# Patient Record
Sex: FEMALE | Race: WHITE | Employment: UNEMPLOYED | URBAN - METROPOLITAN AREA
[De-identification: names, ages, dates, MRNs, and addresses within clinical notes are randomized per-mention and may not be internally consistent; named-entity substitution may affect disease eponyms.]

---

## 2020-08-07 ENCOUNTER — APPOINTMENT (OUTPATIENT)
Dept: CT IMAGING | Age: 70
DRG: 853 | End: 2020-08-07
Payer: COMMERCIAL

## 2020-08-07 ENCOUNTER — APPOINTMENT (OUTPATIENT)
Dept: GENERAL RADIOLOGY | Age: 70
DRG: 853 | End: 2020-08-07
Payer: COMMERCIAL

## 2020-08-07 ENCOUNTER — ANESTHESIA (OUTPATIENT)
Dept: OPERATING ROOM | Age: 70
DRG: 853 | End: 2020-08-07
Payer: COMMERCIAL

## 2020-08-07 ENCOUNTER — ANESTHESIA EVENT (OUTPATIENT)
Dept: OPERATING ROOM | Age: 70
DRG: 853 | End: 2020-08-07
Payer: COMMERCIAL

## 2020-08-07 ENCOUNTER — HOSPITAL ENCOUNTER (INPATIENT)
Age: 70
LOS: 5 days | Discharge: NURSING, REHAB OR LONG-TERM CARE | DRG: 853 | End: 2020-08-12
Attending: EMERGENCY MEDICINE | Admitting: STUDENT IN AN ORGANIZED HEALTH CARE EDUCATION/TRAINING PROGRAM
Payer: COMMERCIAL

## 2020-08-07 VITALS
DIASTOLIC BLOOD PRESSURE: 58 MMHG | SYSTOLIC BLOOD PRESSURE: 95 MMHG | RESPIRATION RATE: 1 BRPM | TEMPERATURE: 96.8 F | OXYGEN SATURATION: 100 %

## 2020-08-07 PROBLEM — E11.10 DKA, TYPE 2, NOT AT GOAL (HCC): Status: ACTIVE | Noted: 2020-08-07

## 2020-08-07 LAB
A/G RATIO: 0.9 (ref 1.1–2.2)
ALBUMIN SERPL-MCNC: 2.9 G/DL (ref 3.4–5)
ALP BLD-CCNC: 82 U/L (ref 40–129)
ALT SERPL-CCNC: 38 U/L (ref 10–40)
ANION GAP SERPL CALCULATED.3IONS-SCNC: 11 MMOL/L (ref 3–16)
ANION GAP SERPL CALCULATED.3IONS-SCNC: 11 MMOL/L (ref 3–16)
ANION GAP SERPL CALCULATED.3IONS-SCNC: 12 MMOL/L (ref 3–16)
ANION GAP SERPL CALCULATED.3IONS-SCNC: 18 MMOL/L (ref 3–16)
AST SERPL-CCNC: 44 U/L (ref 15–37)
BACTERIA: ABNORMAL /HPF
BANDED NEUTROPHILS RELATIVE PERCENT: 4 % (ref 0–7)
BASE EXCESS VENOUS: -4.8 MMOL/L (ref -3–3)
BASE EXCESS VENOUS: -5 MMOL/L (ref -3–3)
BASOPHILS ABSOLUTE: 0 K/UL (ref 0–0.2)
BASOPHILS RELATIVE PERCENT: 0 %
BETA-HYDROXYBUTYRATE: 0.3 MMOL/L (ref 0–0.27)
BILIRUB SERPL-MCNC: 0.9 MG/DL (ref 0–1)
BILIRUBIN URINE: NEGATIVE
BLOOD, URINE: ABNORMAL
BUN BLDV-MCNC: 49 MG/DL (ref 7–20)
BUN BLDV-MCNC: 52 MG/DL (ref 7–20)
BUN BLDV-MCNC: 59 MG/DL (ref 7–20)
BUN BLDV-MCNC: 59 MG/DL (ref 7–20)
CALCIUM SERPL-MCNC: 7.9 MG/DL (ref 8.3–10.6)
CALCIUM SERPL-MCNC: 8 MG/DL (ref 8.3–10.6)
CALCIUM SERPL-MCNC: 8.2 MG/DL (ref 8.3–10.6)
CALCIUM SERPL-MCNC: 8.5 MG/DL (ref 8.3–10.6)
CARBOXYHEMOGLOBIN: 2.5 % (ref 0–1.5)
CARBOXYHEMOGLOBIN: 4.2 % (ref 0–1.5)
CHLORIDE BLD-SCNC: 83 MMOL/L (ref 99–110)
CHLORIDE BLD-SCNC: 91 MMOL/L (ref 99–110)
CHLORIDE BLD-SCNC: 96 MMOL/L (ref 99–110)
CHLORIDE BLD-SCNC: 97 MMOL/L (ref 99–110)
CLARITY: CLEAR
CO2: 18 MMOL/L (ref 21–32)
CO2: 19 MMOL/L (ref 21–32)
CO2: 20 MMOL/L (ref 21–32)
CO2: 21 MMOL/L (ref 21–32)
COLOR: YELLOW
CREAT SERPL-MCNC: 1.7 MG/DL (ref 0.6–1.2)
CREAT SERPL-MCNC: 2 MG/DL (ref 0.6–1.2)
CREAT SERPL-MCNC: 2.2 MG/DL (ref 0.6–1.2)
CREAT SERPL-MCNC: 2.3 MG/DL (ref 0.6–1.2)
EKG ATRIAL RATE: 101 BPM
EKG DIAGNOSIS: NORMAL
EKG P AXIS: 58 DEGREES
EKG P-R INTERVAL: 182 MS
EKG Q-T INTERVAL: 352 MS
EKG QRS DURATION: 104 MS
EKG QTC CALCULATION (BAZETT): 456 MS
EKG R AXIS: -16 DEGREES
EKG T AXIS: 53 DEGREES
EKG VENTRICULAR RATE: 101 BPM
EOSINOPHILS ABSOLUTE: 0 K/UL (ref 0–0.6)
EOSINOPHILS RELATIVE PERCENT: 0 %
EPITHELIAL CELLS, UA: ABNORMAL /HPF (ref 0–5)
GFR AFRICAN AMERICAN: 25
GFR AFRICAN AMERICAN: 27
GFR AFRICAN AMERICAN: 30
GFR AFRICAN AMERICAN: 36
GFR NON-AFRICAN AMERICAN: 21
GFR NON-AFRICAN AMERICAN: 22
GFR NON-AFRICAN AMERICAN: 25
GFR NON-AFRICAN AMERICAN: 30
GLOBULIN: 3.4 G/DL
GLUCOSE BLD-MCNC: 327 MG/DL (ref 70–99)
GLUCOSE BLD-MCNC: 332 MG/DL (ref 70–99)
GLUCOSE BLD-MCNC: 349 MG/DL (ref 70–99)
GLUCOSE BLD-MCNC: 355 MG/DL (ref 70–99)
GLUCOSE BLD-MCNC: 380 MG/DL (ref 70–99)
GLUCOSE BLD-MCNC: 381 MG/DL (ref 70–99)
GLUCOSE BLD-MCNC: 394 MG/DL (ref 70–99)
GLUCOSE BLD-MCNC: 420 MG/DL (ref 70–99)
GLUCOSE BLD-MCNC: 444 MG/DL (ref 70–99)
GLUCOSE BLD-MCNC: 581 MG/DL (ref 70–99)
GLUCOSE BLD-MCNC: 637 MG/DL (ref 70–99)
GLUCOSE BLD-MCNC: 820 MG/DL (ref 70–99)
GLUCOSE BLD-MCNC: 853 MG/DL (ref 70–99)
GLUCOSE BLD-MCNC: >600 MG/DL (ref 70–99)
GLUCOSE BLD-MCNC: >700 MG/DL (ref 70–99)
GLUCOSE URINE: 500 MG/DL
HCO3 VENOUS: 18.1 MMOL/L (ref 23–29)
HCO3 VENOUS: 19.5 MMOL/L (ref 23–29)
HCT VFR BLD CALC: 30.9 % (ref 36–48)
HEMOGLOBIN: 9.7 G/DL (ref 12–16)
INR BLD: 1.29 (ref 0.86–1.14)
KETONES, URINE: NEGATIVE MG/DL
LACTIC ACID: 1.6 MMOL/L (ref 0.4–2)
LACTIC ACID: 3.1 MMOL/L (ref 0.4–2)
LEUKOCYTE ESTERASE, URINE: NEGATIVE
LIPASE: 25 U/L (ref 13–60)
LYMPHOCYTES ABSOLUTE: 0.4 K/UL (ref 1–5.1)
LYMPHOCYTES RELATIVE PERCENT: 2 %
MAGNESIUM: 2.2 MG/DL (ref 1.8–2.4)
MCH RBC QN AUTO: 29.7 PG (ref 26–34)
MCHC RBC AUTO-ENTMCNC: 31.5 G/DL (ref 31–36)
MCV RBC AUTO: 94.4 FL (ref 80–100)
METHEMOGLOBIN VENOUS: 0.3 %
METHEMOGLOBIN VENOUS: 0.6 %
MICROSCOPIC EXAMINATION: YES
MONOCYTES ABSOLUTE: 1.1 K/UL (ref 0–1.3)
MONOCYTES RELATIVE PERCENT: 6 %
NEUTROPHILS ABSOLUTE: 17.3 K/UL (ref 1.7–7.7)
NEUTROPHILS RELATIVE PERCENT: 88 %
NITRITE, URINE: NEGATIVE
O2 CONTENT, VEN: 12 VOL %
O2 CONTENT, VEN: 14 VOL %
O2 SAT, VEN: 98 %
O2 SAT, VEN: 99 %
O2 THERAPY: ABNORMAL
O2 THERAPY: ABNORMAL
PCO2, VEN: 26.7 MMHG (ref 40–50)
PCO2, VEN: 32 MMHG (ref 40–50)
PDW BLD-RTO: 14.7 % (ref 12.4–15.4)
PERFORMED ON: ABNORMAL
PH UA: 5 (ref 5–8)
PH VENOUS: 7.39 (ref 7.35–7.45)
PH VENOUS: 7.44 (ref 7.35–7.45)
PHOSPHORUS: 2.4 MG/DL (ref 2.5–4.9)
PLATELET # BLD: 231 K/UL (ref 135–450)
PLATELET SLIDE REVIEW: ADEQUATE
PMV BLD AUTO: 7.6 FL (ref 5–10.5)
PO2, VEN: 138 MMHG (ref 25–40)
PO2, VEN: 179 MMHG (ref 25–40)
POC SAMPLE TYPE: ABNORMAL
POTASSIUM REFLEX MAGNESIUM: 4.5 MMOL/L (ref 3.5–5.1)
POTASSIUM SERPL-SCNC: 3.6 MMOL/L (ref 3.5–5.1)
POTASSIUM SERPL-SCNC: 4 MMOL/L (ref 3.5–5.1)
POTASSIUM SERPL-SCNC: 5.1 MMOL/L (ref 3.5–5.1)
PRO-BNP: 1468 PG/ML (ref 0–124)
PROTEIN UA: NEGATIVE MG/DL
PROTHROMBIN TIME: 15 SEC (ref 10–13.2)
RBC # BLD: 3.27 M/UL (ref 4–5.2)
RBC UA: ABNORMAL /HPF (ref 0–4)
SLIDE REVIEW: ABNORMAL
SODIUM BLD-SCNC: 119 MMOL/L (ref 136–145)
SODIUM BLD-SCNC: 122 MMOL/L (ref 136–145)
SODIUM BLD-SCNC: 128 MMOL/L (ref 136–145)
SODIUM BLD-SCNC: 128 MMOL/L (ref 136–145)
SPECIFIC GRAVITY UA: 1.01 (ref 1–1.03)
TCO2 CALC VENOUS: 42 MMOL/L
TCO2 CALC VENOUS: 46 MMOL/L
TOTAL CK: 728 U/L (ref 26–192)
TOTAL PROTEIN: 6.3 G/DL (ref 6.4–8.2)
TROPONIN: <0.01 NG/ML
TSH REFLEX: 0.41 UIU/ML (ref 0.27–4.2)
URINE REFLEX TO CULTURE: ABNORMAL
URINE TYPE: ABNORMAL
UROBILINOGEN, URINE: 1 E.U./DL
WBC # BLD: 18.8 K/UL (ref 4–11)
WBC UA: ABNORMAL /HPF (ref 0–5)

## 2020-08-07 PROCEDURE — APPNB30 APP NON BILLABLE TIME 0-30 MINS: Performed by: NURSE PRACTITIONER

## 2020-08-07 PROCEDURE — 85025 COMPLETE CBC W/AUTO DIFF WBC: CPT

## 2020-08-07 PROCEDURE — 2500000003 HC RX 250 WO HCPCS: Performed by: NURSE ANESTHETIST, CERTIFIED REGISTERED

## 2020-08-07 PROCEDURE — 87070 CULTURE OTHR SPECIMN AEROBIC: CPT

## 2020-08-07 PROCEDURE — 82550 ASSAY OF CK (CPK): CPT

## 2020-08-07 PROCEDURE — 7100000000 HC PACU RECOVERY - FIRST 15 MIN: Performed by: SURGERY

## 2020-08-07 PROCEDURE — 87015 SPECIMEN INFECT AGNT CONCNTJ: CPT

## 2020-08-07 PROCEDURE — 80053 COMPREHEN METABOLIC PANEL: CPT

## 2020-08-07 PROCEDURE — 84484 ASSAY OF TROPONIN QUANT: CPT

## 2020-08-07 PROCEDURE — 73700 CT LOWER EXTREMITY W/O DYE: CPT

## 2020-08-07 PROCEDURE — 99291 CRITICAL CARE FIRST HOUR: CPT

## 2020-08-07 PROCEDURE — 2709999900 HC NON-CHARGEABLE SUPPLY: Performed by: SURGERY

## 2020-08-07 PROCEDURE — 36415 COLL VENOUS BLD VENIPUNCTURE: CPT

## 2020-08-07 PROCEDURE — 96367 TX/PROPH/DG ADDL SEQ IV INF: CPT

## 2020-08-07 PROCEDURE — 82803 BLOOD GASES ANY COMBINATION: CPT

## 2020-08-07 PROCEDURE — 6360000002 HC RX W HCPCS: Performed by: STUDENT IN AN ORGANIZED HEALTH CARE EDUCATION/TRAINING PROGRAM

## 2020-08-07 PROCEDURE — 84443 ASSAY THYROID STIM HORMONE: CPT

## 2020-08-07 PROCEDURE — 2580000003 HC RX 258: Performed by: EMERGENCY MEDICINE

## 2020-08-07 PROCEDURE — U0003 INFECTIOUS AGENT DETECTION BY NUCLEIC ACID (DNA OR RNA); SEVERE ACUTE RESPIRATORY SYNDROME CORONAVIRUS 2 (SARS-COV-2) (CORONAVIRUS DISEASE [COVID-19]), AMPLIFIED PROBE TECHNIQUE, MAKING USE OF HIGH THROUGHPUT TECHNOLOGIES AS DESCRIBED BY CMS-2020-01-R: HCPCS

## 2020-08-07 PROCEDURE — 83935 ASSAY OF URINE OSMOLALITY: CPT

## 2020-08-07 PROCEDURE — 2580000003 HC RX 258: Performed by: NURSE ANESTHETIST, CERTIFIED REGISTERED

## 2020-08-07 PROCEDURE — 70450 CT HEAD/BRAIN W/O DYE: CPT

## 2020-08-07 PROCEDURE — 87075 CULTR BACTERIA EXCEPT BLOOD: CPT

## 2020-08-07 PROCEDURE — 0KBR0ZZ EXCISION OF LEFT UPPER LEG MUSCLE, OPEN APPROACH: ICD-10-PCS | Performed by: SURGERY

## 2020-08-07 PROCEDURE — 87206 SMEAR FLUORESCENT/ACID STAI: CPT

## 2020-08-07 PROCEDURE — 6360000002 HC RX W HCPCS: Performed by: NURSE ANESTHETIST, CERTIFIED REGISTERED

## 2020-08-07 PROCEDURE — 2500000003 HC RX 250 WO HCPCS: Performed by: STUDENT IN AN ORGANIZED HEALTH CARE EDUCATION/TRAINING PROGRAM

## 2020-08-07 PROCEDURE — 87040 BLOOD CULTURE FOR BACTERIA: CPT

## 2020-08-07 PROCEDURE — 96365 THER/PROPH/DIAG IV INF INIT: CPT

## 2020-08-07 PROCEDURE — 3700000000 HC ANESTHESIA ATTENDED CARE: Performed by: SURGERY

## 2020-08-07 PROCEDURE — 99292 CRITICAL CARE ADDL 30 MIN: CPT

## 2020-08-07 PROCEDURE — 6360000002 HC RX W HCPCS: Performed by: EMERGENCY MEDICINE

## 2020-08-07 PROCEDURE — 87205 SMEAR GRAM STAIN: CPT

## 2020-08-07 PROCEDURE — 6370000000 HC RX 637 (ALT 250 FOR IP): Performed by: EMERGENCY MEDICINE

## 2020-08-07 PROCEDURE — 87077 CULTURE AEROBIC IDENTIFY: CPT

## 2020-08-07 PROCEDURE — 2580000003 HC RX 258: Performed by: STUDENT IN AN ORGANIZED HEALTH CARE EDUCATION/TRAINING PROGRAM

## 2020-08-07 PROCEDURE — 2580000003 HC RX 258: Performed by: SURGERY

## 2020-08-07 PROCEDURE — 84300 ASSAY OF URINE SODIUM: CPT

## 2020-08-07 PROCEDURE — 3700000001 HC ADD 15 MINUTES (ANESTHESIA): Performed by: SURGERY

## 2020-08-07 PROCEDURE — 83735 ASSAY OF MAGNESIUM: CPT

## 2020-08-07 PROCEDURE — 87186 SC STD MICRODIL/AGAR DIL: CPT

## 2020-08-07 PROCEDURE — 81001 URINALYSIS AUTO W/SCOPE: CPT

## 2020-08-07 PROCEDURE — 11006 DBRDMT SKIN XTRNL GENT PER: CPT | Performed by: SURGERY

## 2020-08-07 PROCEDURE — 3600000012 HC SURGERY LEVEL 2 ADDTL 15MIN: Performed by: SURGERY

## 2020-08-07 PROCEDURE — 99232 SBSQ HOSP IP/OBS MODERATE 35: CPT | Performed by: INTERNAL MEDICINE

## 2020-08-07 PROCEDURE — 85610 PROTHROMBIN TIME: CPT

## 2020-08-07 PROCEDURE — 83880 ASSAY OF NATRIURETIC PEPTIDE: CPT

## 2020-08-07 PROCEDURE — 71045 X-RAY EXAM CHEST 1 VIEW: CPT

## 2020-08-07 PROCEDURE — 72125 CT NECK SPINE W/O DYE: CPT

## 2020-08-07 PROCEDURE — 84100 ASSAY OF PHOSPHORUS: CPT

## 2020-08-07 PROCEDURE — 3600000002 HC SURGERY LEVEL 2 BASE: Performed by: SURGERY

## 2020-08-07 PROCEDURE — 82010 KETONE BODYS QUAN: CPT

## 2020-08-07 PROCEDURE — 83690 ASSAY OF LIPASE: CPT

## 2020-08-07 PROCEDURE — 93005 ELECTROCARDIOGRAM TRACING: CPT | Performed by: EMERGENCY MEDICINE

## 2020-08-07 PROCEDURE — APPSS60 APP SPLIT SHARED TIME 46-60 MINUTES: Performed by: NURSE PRACTITIONER

## 2020-08-07 PROCEDURE — 1200000000 HC SEMI PRIVATE

## 2020-08-07 PROCEDURE — 82947 ASSAY GLUCOSE BLOOD QUANT: CPT

## 2020-08-07 PROCEDURE — 93010 ELECTROCARDIOGRAM REPORT: CPT | Performed by: INTERNAL MEDICINE

## 2020-08-07 PROCEDURE — 87102 FUNGUS ISOLATION CULTURE: CPT

## 2020-08-07 PROCEDURE — 83605 ASSAY OF LACTIC ACID: CPT

## 2020-08-07 PROCEDURE — 87116 MYCOBACTERIA CULTURE: CPT

## 2020-08-07 PROCEDURE — 7100000001 HC PACU RECOVERY - ADDTL 15 MIN: Performed by: SURGERY

## 2020-08-07 PROCEDURE — 74176 CT ABD & PELVIS W/O CONTRAST: CPT

## 2020-08-07 RX ORDER — SODIUM CHLORIDE 9 MG/ML
INJECTION, SOLUTION INTRAVENOUS CONTINUOUS PRN
Status: DISCONTINUED | OUTPATIENT
Start: 2020-08-07 | End: 2020-08-07 | Stop reason: SDUPTHER

## 2020-08-07 RX ORDER — OXCARBAZEPINE 600 MG/1
600 TABLET, FILM COATED ORAL 2 TIMES DAILY
COMMUNITY

## 2020-08-07 RX ORDER — 0.9 % SODIUM CHLORIDE 0.9 %
30 INTRAVENOUS SOLUTION INTRAVENOUS ONCE
Status: DISCONTINUED | OUTPATIENT
Start: 2020-08-07 | End: 2020-08-07

## 2020-08-07 RX ORDER — HYDRALAZINE HYDROCHLORIDE 20 MG/ML
5 INJECTION INTRAMUSCULAR; INTRAVENOUS EVERY 10 MIN PRN
Status: DISCONTINUED | OUTPATIENT
Start: 2020-08-07 | End: 2020-08-08

## 2020-08-07 RX ORDER — PROPOFOL 10 MG/ML
INJECTION, EMULSION INTRAVENOUS PRN
Status: DISCONTINUED | OUTPATIENT
Start: 2020-08-07 | End: 2020-08-07 | Stop reason: SDUPTHER

## 2020-08-07 RX ORDER — INSULIN DETEMIR 100 [IU]/ML
30 INJECTION, SOLUTION SUBCUTANEOUS NIGHTLY
COMMUNITY

## 2020-08-07 RX ORDER — OXYBUTYNIN CHLORIDE 5 MG/1
5 TABLET ORAL 2 TIMES DAILY
COMMUNITY

## 2020-08-07 RX ORDER — CLINDAMYCIN PHOSPHATE 600 MG/50ML
600 INJECTION INTRAVENOUS EVERY 8 HOURS
Status: DISCONTINUED | OUTPATIENT
Start: 2020-08-07 | End: 2020-08-09

## 2020-08-07 RX ORDER — FAMOTIDINE 20 MG/1
20 TABLET, FILM COATED ORAL DAILY
Status: DISCONTINUED | OUTPATIENT
Start: 2020-08-07 | End: 2020-08-12 | Stop reason: HOSPADM

## 2020-08-07 RX ORDER — ACETAMINOPHEN 650 MG
TABLET, EXTENDED RELEASE ORAL
Status: COMPLETED | OUTPATIENT
Start: 2020-08-07 | End: 2020-08-07

## 2020-08-07 RX ORDER — SODIUM CHLORIDE 9 MG/ML
2000 INJECTION, SOLUTION INTRAVENOUS ONCE
Status: COMPLETED | OUTPATIENT
Start: 2020-08-07 | End: 2020-08-07

## 2020-08-07 RX ORDER — LOSARTAN POTASSIUM 50 MG/1
100 TABLET ORAL DAILY
COMMUNITY

## 2020-08-07 RX ORDER — DEXTROSE MONOHYDRATE 25 G/50ML
12.5 INJECTION, SOLUTION INTRAVENOUS PRN
Status: DISCONTINUED | OUTPATIENT
Start: 2020-08-07 | End: 2020-08-12 | Stop reason: HOSPADM

## 2020-08-07 RX ORDER — ACETAMINOPHEN 500 MG
1000 TABLET ORAL ONCE
Status: COMPLETED | OUTPATIENT
Start: 2020-08-07 | End: 2020-08-07

## 2020-08-07 RX ORDER — FENOFIBRATE 145 MG/1
145 TABLET, COATED ORAL DAILY
COMMUNITY
End: 2021-02-22

## 2020-08-07 RX ORDER — SODIUM CHLORIDE 0.9 % (FLUSH) 0.9 %
10 SYRINGE (ML) INJECTION EVERY 12 HOURS SCHEDULED
Status: DISCONTINUED | OUTPATIENT
Start: 2020-08-07 | End: 2020-08-10

## 2020-08-07 RX ORDER — PHENYLEPHRINE HCL IN 0.9% NACL 1 MG/10 ML
SYRINGE (ML) INTRAVENOUS PRN
Status: DISCONTINUED | OUTPATIENT
Start: 2020-08-07 | End: 2020-08-07 | Stop reason: SDUPTHER

## 2020-08-07 RX ORDER — GLYCOPYRROLATE 0.2 MG/ML
INJECTION INTRAMUSCULAR; INTRAVENOUS PRN
Status: DISCONTINUED | OUTPATIENT
Start: 2020-08-07 | End: 2020-08-07 | Stop reason: SDUPTHER

## 2020-08-07 RX ORDER — DEXTROSE MONOHYDRATE 25 G/50ML
12.5 INJECTION, SOLUTION INTRAVENOUS PRN
Status: DISCONTINUED | OUTPATIENT
Start: 2020-08-07 | End: 2020-08-07 | Stop reason: SDUPTHER

## 2020-08-07 RX ORDER — METOCLOPRAMIDE HYDROCHLORIDE 5 MG/ML
INJECTION INTRAMUSCULAR; INTRAVENOUS PRN
Status: DISCONTINUED | OUTPATIENT
Start: 2020-08-07 | End: 2020-08-07 | Stop reason: SDUPTHER

## 2020-08-07 RX ORDER — FUROSEMIDE 40 MG/1
40 TABLET ORAL DAILY
COMMUNITY

## 2020-08-07 RX ORDER — NICOTINE POLACRILEX 4 MG
15 LOZENGE BUCCAL PRN
Status: DISCONTINUED | OUTPATIENT
Start: 2020-08-07 | End: 2020-08-07 | Stop reason: SDUPTHER

## 2020-08-07 RX ORDER — ONDANSETRON 2 MG/ML
4 INJECTION INTRAMUSCULAR; INTRAVENOUS
Status: ACTIVE | OUTPATIENT
Start: 2020-08-07 | End: 2020-08-07

## 2020-08-07 RX ORDER — 0.9 % SODIUM CHLORIDE 0.9 %
15 INTRAVENOUS SOLUTION INTRAVENOUS ONCE
Status: COMPLETED | OUTPATIENT
Start: 2020-08-07 | End: 2020-08-07

## 2020-08-07 RX ORDER — ONDANSETRON 2 MG/ML
INJECTION INTRAMUSCULAR; INTRAVENOUS PRN
Status: DISCONTINUED | OUTPATIENT
Start: 2020-08-07 | End: 2020-08-07 | Stop reason: SDUPTHER

## 2020-08-07 RX ORDER — INSULIN LISPRO 100 [IU]/ML
0-6 INJECTION, SOLUTION INTRAVENOUS; SUBCUTANEOUS NIGHTLY
Status: DISCONTINUED | OUTPATIENT
Start: 2020-08-07 | End: 2020-08-12 | Stop reason: HOSPADM

## 2020-08-07 RX ORDER — POLYETHYLENE GLYCOL 3350 17 G/17G
17 POWDER, FOR SOLUTION ORAL DAILY PRN
Status: DISCONTINUED | OUTPATIENT
Start: 2020-08-07 | End: 2020-08-12 | Stop reason: HOSPADM

## 2020-08-07 RX ORDER — PROMETHAZINE HYDROCHLORIDE 25 MG/1
12.5 TABLET ORAL EVERY 6 HOURS PRN
Status: DISCONTINUED | OUTPATIENT
Start: 2020-08-07 | End: 2020-08-12 | Stop reason: HOSPADM

## 2020-08-07 RX ORDER — SODIUM CHLORIDE 0.9 % (FLUSH) 0.9 %
10 SYRINGE (ML) INJECTION PRN
Status: DISCONTINUED | OUTPATIENT
Start: 2020-08-07 | End: 2020-08-10

## 2020-08-07 RX ORDER — ONDANSETRON 2 MG/ML
4 INJECTION INTRAMUSCULAR; INTRAVENOUS EVERY 6 HOURS PRN
Status: DISCONTINUED | OUTPATIENT
Start: 2020-08-07 | End: 2020-08-12 | Stop reason: HOSPADM

## 2020-08-07 RX ORDER — DEXTROSE MONOHYDRATE 50 MG/ML
100 INJECTION, SOLUTION INTRAVENOUS PRN
Status: DISCONTINUED | OUTPATIENT
Start: 2020-08-07 | End: 2020-08-07 | Stop reason: SDUPTHER

## 2020-08-07 RX ORDER — MAGNESIUM HYDROXIDE 1200 MG/15ML
LIQUID ORAL CONTINUOUS PRN
Status: COMPLETED | OUTPATIENT
Start: 2020-08-07 | End: 2020-08-07

## 2020-08-07 RX ORDER — METOPROLOL TARTRATE 50 MG/1
50 TABLET, FILM COATED ORAL 2 TIMES DAILY
COMMUNITY

## 2020-08-07 RX ORDER — LABETALOL HYDROCHLORIDE 5 MG/ML
5 INJECTION, SOLUTION INTRAVENOUS EVERY 10 MIN PRN
Status: DISCONTINUED | OUTPATIENT
Start: 2020-08-07 | End: 2020-08-08

## 2020-08-07 RX ORDER — HYDROMORPHONE HCL 110MG/55ML
0.5 PATIENT CONTROLLED ANALGESIA SYRINGE INTRAVENOUS EVERY 5 MIN PRN
Status: DISCONTINUED | OUTPATIENT
Start: 2020-08-07 | End: 2020-08-08

## 2020-08-07 RX ORDER — DEXTROSE AND SODIUM CHLORIDE 5; .45 G/100ML; G/100ML
INJECTION, SOLUTION INTRAVENOUS CONTINUOUS PRN
Status: DISCONTINUED | OUTPATIENT
Start: 2020-08-07 | End: 2020-08-08

## 2020-08-07 RX ORDER — KETAMINE HCL IN NACL, ISO-OSM 100MG/10ML
SYRINGE (ML) INJECTION PRN
Status: DISCONTINUED | OUTPATIENT
Start: 2020-08-07 | End: 2020-08-07 | Stop reason: SDUPTHER

## 2020-08-07 RX ORDER — METOPROLOL TARTRATE 50 MG/1
50 TABLET, FILM COATED ORAL 2 TIMES DAILY
Status: DISCONTINUED | OUTPATIENT
Start: 2020-08-07 | End: 2020-08-12 | Stop reason: HOSPADM

## 2020-08-07 RX ORDER — DEXTROSE MONOHYDRATE 50 MG/ML
100 INJECTION, SOLUTION INTRAVENOUS PRN
Status: DISCONTINUED | OUTPATIENT
Start: 2020-08-07 | End: 2020-08-12 | Stop reason: HOSPADM

## 2020-08-07 RX ORDER — HYDROMORPHONE HCL 110MG/55ML
PATIENT CONTROLLED ANALGESIA SYRINGE INTRAVENOUS PRN
Status: DISCONTINUED | OUTPATIENT
Start: 2020-08-07 | End: 2020-08-07 | Stop reason: SDUPTHER

## 2020-08-07 RX ORDER — ISOSORBIDE MONONITRATE 30 MG/1
30 TABLET, EXTENDED RELEASE ORAL DAILY
COMMUNITY

## 2020-08-07 RX ORDER — IBUPROFEN 400 MG/1
400 TABLET ORAL EVERY 8 HOURS PRN
Status: ON HOLD | COMMUNITY
End: 2020-08-12 | Stop reason: HOSPADM

## 2020-08-07 RX ORDER — FENTANYL CITRATE 50 UG/ML
INJECTION, SOLUTION INTRAMUSCULAR; INTRAVENOUS PRN
Status: DISCONTINUED | OUTPATIENT
Start: 2020-08-07 | End: 2020-08-07 | Stop reason: SDUPTHER

## 2020-08-07 RX ORDER — LIDOCAINE HYDROCHLORIDE 20 MG/ML
INJECTION, SOLUTION EPIDURAL; INFILTRATION; INTRACAUDAL; PERINEURAL PRN
Status: DISCONTINUED | OUTPATIENT
Start: 2020-08-07 | End: 2020-08-07 | Stop reason: SDUPTHER

## 2020-08-07 RX ORDER — ATORVASTATIN CALCIUM 40 MG/1
40 TABLET, FILM COATED ORAL DAILY
COMMUNITY

## 2020-08-07 RX ORDER — INSULIN LISPRO 100 [IU]/ML
0-12 INJECTION, SOLUTION INTRAVENOUS; SUBCUTANEOUS
Status: DISCONTINUED | OUTPATIENT
Start: 2020-08-08 | End: 2020-08-12 | Stop reason: HOSPADM

## 2020-08-07 RX ORDER — MAGNESIUM SULFATE 1 G/100ML
1 INJECTION INTRAVENOUS PRN
Status: DISCONTINUED | OUTPATIENT
Start: 2020-08-07 | End: 2020-08-10

## 2020-08-07 RX ORDER — POTASSIUM CHLORIDE 7.45 MG/ML
10 INJECTION INTRAVENOUS PRN
Status: DISCONTINUED | OUTPATIENT
Start: 2020-08-07 | End: 2020-08-10

## 2020-08-07 RX ORDER — LANOLIN ALCOHOL/MO/W.PET/CERES
3 CREAM (GRAM) TOPICAL DAILY
COMMUNITY

## 2020-08-07 RX ORDER — ACETAMINOPHEN 325 MG/1
650 TABLET ORAL EVERY 6 HOURS PRN
Status: DISCONTINUED | OUTPATIENT
Start: 2020-08-07 | End: 2020-08-12 | Stop reason: HOSPADM

## 2020-08-07 RX ORDER — NICOTINE POLACRILEX 4 MG
15 LOZENGE BUCCAL PRN
Status: DISCONTINUED | OUTPATIENT
Start: 2020-08-07 | End: 2020-08-12 | Stop reason: HOSPADM

## 2020-08-07 RX ORDER — ACETAMINOPHEN 650 MG/1
650 SUPPOSITORY RECTAL EVERY 6 HOURS PRN
Status: DISCONTINUED | OUTPATIENT
Start: 2020-08-07 | End: 2020-08-12 | Stop reason: HOSPADM

## 2020-08-07 RX ORDER — SODIUM CHLORIDE 9 MG/ML
INJECTION, SOLUTION INTRAVENOUS CONTINUOUS
Status: DISCONTINUED | OUTPATIENT
Start: 2020-08-07 | End: 2020-08-07

## 2020-08-07 RX ORDER — METOCLOPRAMIDE 5 MG/1
5 TABLET ORAL 4 TIMES DAILY
COMMUNITY

## 2020-08-07 RX ORDER — ASPIRIN 81 MG/1
81 TABLET, CHEWABLE ORAL DAILY
COMMUNITY

## 2020-08-07 RX ORDER — SODIUM CHLORIDE 450 MG/100ML
INJECTION, SOLUTION INTRAVENOUS CONTINUOUS PRN
Status: DISCONTINUED | OUTPATIENT
Start: 2020-08-07 | End: 2020-08-10

## 2020-08-07 RX ADMIN — SODIUM CHLORIDE 2000 ML: 9 INJECTION, SOLUTION INTRAVENOUS at 08:53

## 2020-08-07 RX ADMIN — ONDANSETRON 4 MG: 2 INJECTION INTRAMUSCULAR; INTRAVENOUS at 16:55

## 2020-08-07 RX ADMIN — Medication 10 MG: at 17:09

## 2020-08-07 RX ADMIN — Medication 10 MG: at 17:18

## 2020-08-07 RX ADMIN — HYDROMORPHONE HYDROCHLORIDE 0.5 MG: 2 INJECTION, SOLUTION INTRAMUSCULAR; INTRAVENOUS; SUBCUTANEOUS at 18:07

## 2020-08-07 RX ADMIN — SODIUM CHLORIDE: 4.5 INJECTION, SOLUTION INTRAVENOUS at 16:37

## 2020-08-07 RX ADMIN — SODIUM PHOSPHATE, MONOBASIC, MONOHYDRATE 10 MMOL: 276; 142 INJECTION, SOLUTION INTRAVENOUS at 22:05

## 2020-08-07 RX ADMIN — Medication 200 MCG: at 17:41

## 2020-08-07 RX ADMIN — Medication 200 MCG: at 17:18

## 2020-08-07 RX ADMIN — SODIUM CHLORIDE: 9 INJECTION, SOLUTION INTRAVENOUS at 16:30

## 2020-08-07 RX ADMIN — SODIUM CHLORIDE: 4.5 INJECTION, SOLUTION INTRAVENOUS at 12:35

## 2020-08-07 RX ADMIN — CLINDAMYCIN IN 5 PERCENT DEXTROSE 600 MG: 12 INJECTION, SOLUTION INTRAVENOUS at 12:36

## 2020-08-07 RX ADMIN — ACETAMINOPHEN 1000 MG: 500 TABLET, FILM COATED ORAL at 08:53

## 2020-08-07 RX ADMIN — Medication 10 MEQ: at 22:01

## 2020-08-07 RX ADMIN — VANCOMYCIN HYDROCHLORIDE 1750 MG: 1 INJECTION, POWDER, LYOPHILIZED, FOR SOLUTION INTRAVENOUS at 11:07

## 2020-08-07 RX ADMIN — HYDROMORPHONE HYDROCHLORIDE 0.5 MG: 2 INJECTION, SOLUTION INTRAMUSCULAR; INTRAVENOUS; SUBCUTANEOUS at 18:27

## 2020-08-07 RX ADMIN — ENOXAPARIN SODIUM 40 MG: 40 INJECTION SUBCUTANEOUS at 21:40

## 2020-08-07 RX ADMIN — METOCLOPRAMIDE 10 MG: 5 INJECTION, SOLUTION INTRAMUSCULAR; INTRAVENOUS at 16:58

## 2020-08-07 RX ADMIN — PROPOFOL 150 MG: 10 INJECTION, EMULSION INTRAVENOUS at 16:55

## 2020-08-07 RX ADMIN — SODIUM CHLORIDE: 4.5 INJECTION, SOLUTION INTRAVENOUS at 21:30

## 2020-08-07 RX ADMIN — GLYCOPYRROLATE 0.2 MG: 0.2 INJECTION INTRAMUSCULAR; INTRAVENOUS at 17:03

## 2020-08-07 RX ADMIN — CEFEPIME HYDROCHLORIDE 2 G: 2 INJECTION, POWDER, FOR SOLUTION INTRAVENOUS at 08:54

## 2020-08-07 RX ADMIN — SODIUM CHLORIDE 0.1 UNITS/KG/HR: 9 INJECTION, SOLUTION INTRAVENOUS at 12:17

## 2020-08-07 RX ADMIN — AZITHROMYCIN MONOHYDRATE 500 MG: 500 INJECTION, POWDER, LYOPHILIZED, FOR SOLUTION INTRAVENOUS at 09:29

## 2020-08-07 RX ADMIN — LIDOCAINE HYDROCHLORIDE 100 MG: 20 INJECTION, SOLUTION EPIDURAL; INFILTRATION; INTRACAUDAL; PERINEURAL at 16:55

## 2020-08-07 RX ADMIN — Medication 200 MCG: at 17:07

## 2020-08-07 RX ADMIN — CLINDAMYCIN IN 5 PERCENT DEXTROSE 600 MG: 12 INJECTION, SOLUTION INTRAVENOUS at 21:32

## 2020-08-07 RX ADMIN — SODIUM CHLORIDE 1674 ML: 9 INJECTION, SOLUTION INTRAVENOUS at 11:20

## 2020-08-07 RX ADMIN — FENTANYL CITRATE 50 MCG: 50 INJECTION, SOLUTION INTRAMUSCULAR; INTRAVENOUS at 16:54

## 2020-08-07 RX ADMIN — Medication 10 ML: at 21:36

## 2020-08-07 RX ADMIN — Medication 10 MEQ: at 23:06

## 2020-08-07 RX ADMIN — CEFEPIME HYDROCHLORIDE 2 G: 2 INJECTION, POWDER, FOR SOLUTION INTRAVENOUS at 20:43

## 2020-08-07 RX ADMIN — FENTANYL CITRATE 50 MCG: 50 INJECTION, SOLUTION INTRAMUSCULAR; INTRAVENOUS at 16:50

## 2020-08-07 RX ADMIN — Medication 10 MG: at 17:22

## 2020-08-07 SDOH — HEALTH STABILITY: MENTAL HEALTH: HOW OFTEN DO YOU HAVE A DRINK CONTAINING ALCOHOL?: NEVER

## 2020-08-07 ASSESSMENT — PULMONARY FUNCTION TESTS
PIF_VALUE: 15
PIF_VALUE: 5
PIF_VALUE: 0
PIF_VALUE: 9
PIF_VALUE: 5
PIF_VALUE: 7
PIF_VALUE: 1
PIF_VALUE: 1
PIF_VALUE: 0
PIF_VALUE: 2
PIF_VALUE: 5
PIF_VALUE: 15
PIF_VALUE: 9
PIF_VALUE: 18
PIF_VALUE: 9
PIF_VALUE: 2
PIF_VALUE: 15
PIF_VALUE: 0
PIF_VALUE: 5
PIF_VALUE: 6
PIF_VALUE: 15
PIF_VALUE: 14
PIF_VALUE: 16
PIF_VALUE: 9
PIF_VALUE: 17
PIF_VALUE: 5
PIF_VALUE: 15
PIF_VALUE: 17
PIF_VALUE: 8
PIF_VALUE: 5
PIF_VALUE: 14
PIF_VALUE: 7
PIF_VALUE: 6
PIF_VALUE: 0
PIF_VALUE: 13
PIF_VALUE: 4
PIF_VALUE: 2
PIF_VALUE: 14
PIF_VALUE: 0
PIF_VALUE: 6
PIF_VALUE: 17
PIF_VALUE: 7
PIF_VALUE: 17
PIF_VALUE: 18
PIF_VALUE: 1
PIF_VALUE: 0
PIF_VALUE: 21
PIF_VALUE: 5
PIF_VALUE: 0
PIF_VALUE: 15
PIF_VALUE: 2
PIF_VALUE: 8
PIF_VALUE: 2
PIF_VALUE: 9
PIF_VALUE: 17
PIF_VALUE: 19
PIF_VALUE: 4
PIF_VALUE: 17
PIF_VALUE: 6
PIF_VALUE: 14
PIF_VALUE: 8
PIF_VALUE: 6
PIF_VALUE: 1
PIF_VALUE: 17
PIF_VALUE: 13
PIF_VALUE: 0
PIF_VALUE: 13
PIF_VALUE: 2
PIF_VALUE: 16
PIF_VALUE: 19
PIF_VALUE: 5
PIF_VALUE: 0
PIF_VALUE: 6
PIF_VALUE: 7
PIF_VALUE: 2
PIF_VALUE: 5
PIF_VALUE: 15
PIF_VALUE: 5
PIF_VALUE: 8
PIF_VALUE: 2
PIF_VALUE: 9
PIF_VALUE: 6
PIF_VALUE: 4
PIF_VALUE: 13

## 2020-08-07 ASSESSMENT — PAIN DESCRIPTION - PAIN TYPE: TYPE: ACUTE PAIN

## 2020-08-07 ASSESSMENT — PAIN - FUNCTIONAL ASSESSMENT: PAIN_FUNCTIONAL_ASSESSMENT: 0-10

## 2020-08-07 ASSESSMENT — LIFESTYLE VARIABLES: SMOKING_STATUS: 0

## 2020-08-07 ASSESSMENT — PAIN SCALES - GENERAL
PAINLEVEL_OUTOF10: 9
PAINLEVEL_OUTOF10: 5
PAINLEVEL_OUTOF10: 0
PAINLEVEL_OUTOF10: 0
PAINLEVEL_OUTOF10: 4
PAINLEVEL_OUTOF10: 5

## 2020-08-07 ASSESSMENT — PAIN DESCRIPTION - ORIENTATION: ORIENTATION: LEFT;RIGHT

## 2020-08-07 ASSESSMENT — PAIN DESCRIPTION - LOCATION: LOCATION: KNEE

## 2020-08-07 NOTE — ED NOTES
Noted redness & swelling to left groin. Small open wound to kimi area draining odorous purulent discharge. Pt reports wound has been present for two weeks. Dr Lindsay Wick aware; at bedside to examine.        Juan Hallman RN  08/07/20 [id number]

## 2020-08-07 NOTE — ANESTHESIA PRE PROCEDURE
Provider, MD   sertraline (ZOLOFT) 50 MG tablet Take 50 mg by mouth daily   Yes Historical Provider, MD   Cholecalciferol (VITAMIN D3) 125 MCG (5000 UT) TABS Take by mouth   Yes Historical Provider, MD   ibuprofen (ADVIL;MOTRIN) 400 MG tablet Take 400 mg by mouth every 8 hours as needed for Pain   Yes Historical Provider, MD       Current medications:    Current Facility-Administered Medications   Medication Dose Route Frequency Provider Last Rate Last Dose    glucose (GLUTOSE) 40 % oral gel 15 g  15 g Oral PRN Timo Manrique MD        dextrose 50 % IV solution  12.5 g Intravenous PRN Timo Manrique MD        glucagon (rDNA) injection 1 mg  1 mg Intramuscular PRN Timo Manrique MD        dextrose 5 % solution  100 mL/hr Intravenous PRN Timo Manrique MD        insulin regular (HUMULIN R;NOVOLIN R) 100 Units in sodium chloride 0.9 % 100 mL infusion  0.1 Units/kg/hr Intravenous Continuous Timo Manrique MD 2.8 mL/hr at 08/07/20 1536 2.8 Units/hr at 08/07/20 1536    sodium chloride flush 0.9 % injection 10 mL  10 mL Intravenous 2 times per day Melinda Ramirez MD        sodium chloride flush 0.9 % injection 10 mL  10 mL Intravenous PRN Melinda Ramirez MD        acetaminophen (TYLENOL) tablet 650 mg  650 mg Oral Q6H PRN Melinda Ramirez MD        Or    acetaminophen (TYLENOL) suppository 650 mg  650 mg Rectal Q6H PRN Melinda Ramirez MD        polyethylene glycol (GLYCOLAX) packet 17 g  17 g Oral Daily PRN Melinda Ramirez MD        promethazine (PHENERGAN) tablet 12.5 mg  12.5 mg Oral Q6H PRN Melinda Ramirez MD        Or    ondansetron [facility]) injection 4 mg  4 mg Intravenous Q6H PRN Melinda Ramirez MD        famotidine (PEPCID) tablet 20 mg  20 mg Oral Daily Melinda Ramirez MD        0.9 % sodium chloride infusion   Intravenous Continuous Chauncey Sequeira MD        dextrose 50 % IV solution  12.5 g Intravenous PRN Melinda Ramirez MD        potassium chloride 10 mEq/100 mL IVPB (Peripheral Line)  10 mEq Intravenous PRN Brice Adams MD        magnesium sulfate 1 g in dextrose 5% 100 mL IVPB  1 g Intravenous PRN Brice Adams MD        sodium phosphate 10 mmol in dextrose 5 % 250 mL IVPB  10 mmol Intravenous PRN Brice Adams MD        Or    sodium phosphate 15 mmol in dextrose 5 % 250 mL IVPB  15 mmol Intravenous PRN Brice Adams MD        Or    sodium phosphate 20 mmol in dextrose 5 % 500 mL IVPB  20 mmol Intravenous PRN Brice Adams MD        dextrose 5 % and 0.45 % sodium chloride infusion   Intravenous Continuous PRN Brice Adams MD        0.45 % sodium chloride infusion   Intravenous Continuous PRN Brice Adams  mL/hr at 08/07/20 1235      cefepime (MAXIPIME) 2 g IVPB minibag  2 g Intravenous Q12H Brice Adams MD        clindamycin (CLEOCIN) 600 mg in dextrose 5 % 50 mL IVPB  600 mg Intravenous Q8H Brice Adams MD   Stopped at 08/07/20 1306    enoxaparin (LOVENOX) injection 40 mg  40 mg Subcutaneous Daily Narvaez Dicker, MD        [START ON 8/8/2020] vancomycin 1000 mg IVPB in 250 mL D5W addavial  1,000 mg Intravenous Q24H Brice Adams MD           Allergies: Allergies   Allergen Reactions    Chocolate Hives     Breakout in hives on face    Seroquel [Quetiapine] Anxiety       Problem List:    Patient Active Problem List   Diagnosis Code    DKA, type 2, not at goal [facility]) E11.10    Diabetic ketoacidosis without coma associated with type 2 diabetes mellitus ([facility] [address] E11.10       Past Medical History:  History reviewed. No pertinent past medical history. Past Surgical History:  History reviewed. No pertinent surgical history.     Social History:    Social History     Tobacco Use    Smoking status: Never Smoker    Smokeless tobacco: Never Used   Substance Use Topics    Alcohol use: Never     Frequency: Never                                Counseling given: Not Answered      Vital Signs (Current):   Vitals:    08/07/20 1215 08/07/20 1400 08/07/20 1500 08/07/20 1601   BP: (!) 114/55 (!) 105/51 (!) 106/51 (!) normal exam         Pulmonary:normal exam  breath sounds clear to auscultation      (-) COPD, asthma, sleep apnea and not a current smoker                           Cardiovascular:    (+) hypertension:, CAD:, CABG/stent (CAD s/p PCI of the RCA 2015):, hyperlipidemia    (-) dysrhythmias,  angina and  CHF (echo 2019 Ef 60-65 no RWMA)    ECG reviewed  Rhythm: regular  Rate: normal  Echocardiogram reviewed         Beta Blocker:  Dose within 24 Hrs         Neuro/Psych:   (+) depression/anxiety    (-) seizures, TIA and CVA           GI/Hepatic/Renal:   (+) GERD (gastroparesis):, renal disease: ARF and CRI,      (-) liver disease       Endo/Other:    (+) DiabetesType II DM, poorly controlled, using insulin, : arthritis: OA., .    (-) hypothyroidism, hyperthyroidism               Abdominal:   (+) obese,         Vascular:                                        Anesthesia Plan      general     ASA 4 - emergent       Induction: intravenous. MIPS: Postoperative opioids intended and Prophylactic antiemetics administered. Anesthetic plan and risks discussed with patient. Plan discussed with CRNA.                   Ama Bustamante MD   8/7/2020

## 2020-08-07 NOTE — H&P
HOSPITALISTS HISTORY AND PHYSICAL    8/7/2020 5:15 PM    Patient Information:  Анна Lucas is a 78 y.o. female [id number]  PCP:  Tien Lamar MD (Tel: [phone] )    Chief complaint:    Chief Complaint   Patient presents with   [facility], Pt had fall yester day, lost ballance while putting food in refigerator. Shortness of breath    History of Present Illness:  Enma Arrington is a 78 y.o. female who presented with fever, from what [address] [place] [address] home, hyperglycemia, hyponatremia, pneumonia, shortness of breath, left groin cellulitis, multiple falls patient stated that she has not been eating for the last 3 days, poor oral intake, at nursing home she had a fever , and she was febrile in the ER, her lab was significant for WBC 18.8, hemoglobin is stable at 9.7, sodium is 119, potassium 4.5, creatinine 2.3, glucose 853, anion gap acidosis 19, lactic acidosis with lactic acid 3.1, proBNP 1468, , she was started on insulin drip, she had 2 L of fluid, started on antibiotic, had a CAT scan abdomen which revealed gas-forming abscess/cellulitis in the left groin surgery was consulted from ER. Patient was admitted to ICU for management of DKA, sepsis, rule out COVID. History obtained from patient and ER provider        REVIEW OF SYSTEMS:   Constitutional: Fever   ENT: Negative for rhinorrhea, epistaxis, hoarseness, sore throat. Respiratory: Negative for shortness of breath,wheezing  Cardiovascular: Negative for chest pain, palpitations   Gastrointestinal: Negative for nausea, vomiting, diarrhea  Genitourinary: Negative for polyuria, dysuria   Hematologic/Lymphatic: Negative for bleeding tendency, easy bruising  Musculoskeletal: Negative for myalgias and arthralgias  Neurologic: Negative for confusion,dysarthria.   Skin: Left groin cellulitis   psychiatric: Negative for depression,anxiety, agitation. Endocrine: Negative for polydipsia,polyuria,heat /cold intolerance. Past Medical History:   has no past medical history on file. Past Surgical History:   has no past surgical history on file. Medications:  No current facility-administered medications on file prior to encounter. Current Outpatient Medications on File Prior to Encounter   Medication Sig Dispense Refill    aspirin 81 MG chewable tablet Take 81 mg by mouth daily      atorvastatin (LIPITOR) 40 MG tablet Take 40 mg by mouth daily      ticagrelor (BRILINTA) 90 MG TABS tablet Take 90 mg by mouth 2 times daily      fenofibrate (TRICOR) 145 MG tablet Take 145 mg by mouth daily      furosemide (LASIX) 40 MG tablet Take 40 mg by mouth daily      isosorbide mononitrate (IMDUR) 30 MG extended release tablet Take 30 mg by mouth daily      insulin detemir (LEVEMIR) 100 UNIT/ML injection vial Inject 30 Units into the skin nightly      losartan (COZAAR) 50 MG tablet Take 50 mg by mouth daily      melatonin 3 MG TABS tablet Take 3 mg by mouth daily      metoclopramide (REGLAN) 5 MG tablet Take 5 mg by mouth 4 times daily      metoprolol tartrate (LOPRESSOR) 50 MG tablet Take 50 mg by mouth 2 times daily      insulin regular (HUMULIN R;NOVOLIN R) 100 UNIT/ML injection Inject 10 Units into the skin 3 times daily      OXcarbazepine (TRILEPTAL) 600 MG tablet Take 600 mg by mouth 2 times daily      oxybutynin (DITROPAN) 5 MG tablet Take 5 mg by mouth 2 times daily      sertraline (ZOLOFT) 50 MG tablet Take 50 mg by mouth daily      Cholecalciferol (VITAMIN D3) 125 MCG (5000 UT) TABS Take by mouth      ibuprofen (ADVIL;MOTRIN) 400 MG tablet Take 400 mg by mouth every 8 hours as needed for Pain         Allergies:   Allergies   Allergen Reactions    Chocolate Hives     Breakout in hives on face    Seroquel [Quetiapine] Anxiety        Social History:  Patient Lives nursing home   reports that she has never smoked. She has never used smokeless tobacco. She reports that she does not drink alcohol or use drugs. Family History:  family history is not on file. ,     Physical Exam:  BP (!) 108/49   Pulse 73   Temp 99.8 °F (37.7 °C) (Temporal)   Resp 18   Ht 5' 9\" (1.753 m)   Wt 247 lb 9.6 oz (112.3 kg)   SpO2 97%   BMI 36.56 kg/m²     General appearance:  Appears comfortable. Well nourished  Eyes: Sclera clear, pupils equal  ENT: Moist mucus membranes, no thrush. Trachea midline. Cardiovascular: Regular rhythm, normal S1, S2. No murmur, gallop, rub. No edema in lower extremities  Respiratory: Clear to auscultation bilaterally, no wheeze, good inspiratory effort  Gastrointestinal: Abdomen soft, non-tender, not distended, normal bowel sounds  Musculoskeletal: No cyanosis in digits, neck supple  Neurology: Cranial nerves grossly intact. Alert and oriented in time, place and person. No speech or motor deficits  Psychiatry: Appropriate affect. Not agitated  Skin: Left groin cellulitis/abscess  Brisk capillary refill, peripheral pulses palpable   Labs:  CBC:   Lab Results   Component Value Date    WBC 18.8 08/07/2020    RBC 3.27 08/07/2020    HGB 9.7 08/07/2020    HCT 30.9 08/07/2020    MCV 94.4 08/07/2020    MCH 29.7 08/07/2020    MCHC 31.5 08/07/2020    RDW 14.7 08/07/2020     08/07/2020    MPV 7.6 08/07/2020     BMP:    Lab Results   Component Value Date     08/07/2020    K 4.0 08/07/2020    K 4.5 08/07/2020    CL 96 08/07/2020    CO2 21 08/07/2020    BUN 52 08/07/2020    CREATININE 2.0 08/07/2020    CALCIUM 8.2 08/07/2020    GFRAA 30 08/07/2020    GFRAA 29 09/21/2010    LABGLOM 25 08/07/2020    GLUCOSE 420 08/07/2020     CT FEMUR LEFT WO CONTRAST   Preliminary Result   Soft tissue emphysema in the proximal medial thigh extending into the   anterior proximal thigh and laterally around the hip to the level of the   anterosuperior iliac spine.   Findings could relate to the patient's clinical   ulcer or gas-forming soft tissue infection. No intramuscular gas in the thigh. No abscess or fluid collection. Findings were discussed with Aye LATHAM at 10:26 am on 8/7/2020. CT ABDOMEN PELVIS WO CONTRAST Additional Contrast? None   Final Result   1. Gas-forming cellulitis of the upper left thigh and groin   2. Chronic left hydronephrosis with renal atrophy, distal ureteral stricture   suspected   3. Left nephrolithiasis         CT CERVICAL SPINE WO CONTRAST   Final Result   No acute abnormality of the cervical spine. Moderate multilevel degenerative   disc disease worse between C5 and C7. CT HEAD WO CONTRAST   Final Result   No acute intracranial abnormality. XR CHEST PORTABLE   Final Result   Small left-sided pleural effusion with overlying consolidation may be   secondary to atelectasis versus left lower lobe pneumonia. Chest Xray:   EKG:    I visualized CXR images and EKG strips     Discussed case  with patient and ICU nurse    Problem List  Active Problems:    DKA, type 2, not at goal [facility])    Severe sepsis ([address]    Lactic acidosis    Atelectasis  Resolved Problems:    * No resolved hospital problems.  *        Assessment/Plan:     Severe sepsis secondary to left groin cellulitis/abscess  Critical care consult  IV bolus in ER  Continue maintenance fluid as per DKA protocol  Broad-spectrum antibiotic  Vanco and cefepime and clindamycin  COVID rule out, pending  Patient is not on pressors    Diabetic ketoacidosis secondary to above  Possible secondary to cellulitis in the left groin  Surgery consult  Pending cultures  Continue insulin drip  Continue antibiotic  Electrolytes replacement as per protocol    Hyponatremia, pseudohyponatremia  Nephrology consult  Continue with normal saline    Diabetes, uncontrolled  Insulin drip  Keep n.p.o. for now    Coronary artery disease  Beta-blocker as blood pressure permits  We will hold ACE and arm  Continue aspirin    DVT

## 2020-08-07 NOTE — PROGRESS NOTES
Attempted venous blood draw for blood glucose, unable to draw. Attempted glucometer, greater than 600. Attempted to draw blood off of IV and run on EPOC, glucose > 700. Called lab, will initiate hourly blood glucose checks until glucose is less than 600.

## 2020-08-07 NOTE — PROGRESS NOTES
Pt admitted to ICU/5C as DKA + Sepsis pt, COVID r/o d/t nursing home pt.  per lab call, insulin gtt not infusing at this time after 5 hours in ER. Vancomycin and NS bolus infusing in 1 IV, other IV saline locked. Insulin gtt started per DKA protocol, 0.45% NS infusing at 250mL/hr per DKA protocol, see MAR. Pt A/Ox4, VSS, tolerating room air. Impaired mobility d/t left groin abscess, red/swollen/painful, no opening or drainage noted. Repositioned, small tear on coccyx with redness, mepilex border applied. Scattered bruising noted. Llanos intact, draining yellow urine. Abdomen distended but soft, nontender, pt states she has a hernia. Admission documentation completed. Pt demonstrates ability to reposition self adequately. PoC discussed. Bed alarm on, call light in reach, no further needs at this time, will continue to monitor.

## 2020-08-07 NOTE — ANESTHESIA POSTPROCEDURE EVALUATION
Department of Anesthesiology  Postprocedure Note    Patient: Alfredo Martinez  MRN: [id number]  YOB: 1950  Date of evaluation: 8/7/2020  Time:  6:43 PM     Procedure Summary     Date:  08/07/20 Room / Location:  [address] [place] [address]    Anesthesia Start:  1648 Anesthesia Stop:  1838    Procedure:  INCISION AND DRAINAGE OF LEFT GROIN AND LEFT UPPER THIGH, DEBRIDEMENT OF LEFT GROIN AND LEFT UPPER THIGH (Left Groin) Diagnosis:  (Abscess Left Groin and Left Upper Thigh)    Surgeon:  Eva Boudreaux MD Responsible Provider:  Nasreen Benson MD    Anesthesia Type:  general ASA Status:  4 - Emergent          Anesthesia Type: general    Diaz Phase I: Diaz Score: 8    Diaz Phase II:      Last vitals: Reviewed and per EMR flowsheets.        Anesthesia Post Evaluation    Patient location during evaluation: PACU  Patient participation: complete - patient participated  Level of consciousness: awake and alert  Airway patency: patent  Nausea & Vomiting: no vomiting and no nausea  Complications: no  Cardiovascular status: hemodynamically stable  Respiratory status: acceptable  Hydration status: stable

## 2020-08-07 NOTE — CONSULTS
[facility]                     [facility], [address]                                  CONSULTATION    PATIENT NAME: Bal Prajapati                   :        1950  MED REC NO:   [id number]                          ROOM:       5635  ACCOUNT NO:   [de-identified]                           ADMIT DATE: 2020  PROVIDER:     Ricardo Swann MD    RENAL CONSULTATION    CONSULT DATE:  2020    CONSULTING PHYSICIAN:  Ricardo Swann MD    REFERRING PROVIDER:  Sylwia Capellan MD    REASON FOR CONSULTATION:  Acute kidney injury on CKD III, hyponatremia,  metabolic acidosis. HISTORY OF PRESENT ILLNESS:  The patient is a 67-year-old female with  history of CKD III, baseline creatinine of 1.4, nephrolithiasis,  obesity, diabetes mellitus, hypertension, chronic lower extremity edema,  hyperlipidemia who presented to the hospital today secondary to malaise  and low-grade fevers. Her COVID testing is pending. She lives at  [facility]. Her Accu-Chek was noted to be greater than 600. Quantification in the ER showed a blood glucose of 853. Creatinine is  up to 2.3 with a BUN of 59, sodium of 119 and a serum bicarbonate of 18. Lowest systolic blood pressure was in the 90s range. Her outpatient  medications included furosemide and losartan. She denies taking  ibuprofen regularly. She does have nausea, but no vomiting or diarrhea. She does have decreased p.o. intake. PAST MEDICAL HISTORY:  Includes obesity, chronic kidney disease stage  III, hypertension, diabetes mellitus, hyperlipidemia. ALLERGIES:  Include SEROQUEL. MEDICATIONS PRIOR TO ADMISSION:  Includes aspirin, atorvastatin,  ticagrelor, fenofibrate, furosemide, isosorbide mononitrate, insulin,  losartan, melatonin, metoclopramide, metoprolol, oxcarbazepine,  oxybutynin, sertraline, cholecalciferol, ibuprofen p.r.n. SOCIAL HISTORY:  Came from [facility] [place].   No active smoking, alcohol or  drug abuse.  She is . FAMILY HISTORY:  Denies any family history of kidney disease. REVIEW OF SYSTEMS:  GENERAL:  Positive malaise. SKIN:  No skin rash, itching or discharge. NEUROLOGIC:  No headaches or focal deficits. CARDIOVASCULAR:  No chest pain or palpitations. PULMONARY:  No shortness of breath. No coughing, no hemoptysis. GASTROINTESTINAL:  No abdominal pain. Positive nausea. No vomiting, no  diarrhea. Decreased p.o. intake. RENAL:  Denies any change in urine output. No gross hematuria. Last  stone passage was about 10 years ago. ENDOCRINE:  History of diabetes. No heat or cold intolerance. INFECTIOUS DISEASES:  Positive fever and chills. MUSCULOSKELETAL:  Denies any active joint pain. PHYSICAL EXAMINATION:  VITAL SIGNS:  Blood pressure 111/44, pulse 91, respirations 21,  temperature 100 degrees Fahrenheit, saturating 92%. Weight listed at  111.6 kg. Urine output not recorded. GENERAL:  Obese. HEENT:  Anicteric sclerae, clear conjunctivae. SKIN:  Anicteric, no rash. CHEST:  Clear. HEART:  Regular. ABDOMEN:  Obese, soft, nontender. No rebound or involuntary guarding. EXTREMITIES:  Show 1+ edema. LABORATORY DATA:  Sodium 119, potassium 4.5, chloride 83, bicarb 18, BUN  59, creatinine 2.3, anion gap 18, lactic acid 3.1, glucose 853. . ProBNP 1468. Albumin 2.9. Beta hydroxybutyrate elevated at 0.3. WBC  18.8, hemoglobin 9.7, hematocrit 31, platelet count 827,464. INR 1.29. Urinalysis, specific gravity 1.010, pH is 5, glucose is 500, blood is  trace, protein is negative. Venous blood gas, pH 7.44, pCO2 27. DIAGNOSTIC DATA:  Chest x-ray shows small left-sided pleural effusion  with overlying consolidation may be secondary to atelectasis versus left  lower lobe pneumonia. CAT scan of the head shows no acute intracranial  abnormality.   CAT scan of the abdomen and pelvis without contrast shows  gas forming cellulitis in the upper left thigh and groin, chronic left  hydronephrosis with renal atrophy, distal ureteral stricture, suspected  left nephrolithiasis. ASSESSMENT AND PLAN:  1. DEVON on CKD III, baseline creatinine 1.4. Etiology of DEVON probably  due to prerenal azotemia in the setting of elevated blood glucose,  relative hypotension, ARB and Lasix use. Agree with IV fluid bolus. Can use normal saline at maintenance IV fluid for now, but would follow  serum sodium closely. 2.  Hyponatremia, corrected sodium around 129. Check urine osmolarity  and urine sodium. Unclear duration of hyponatremia. Would try not to  correct more than 8 mEq over 24 hours or 16 mEq over 48 hours. She does  have medications that can increase ADH release including oxcarbazepine  and sertraline. 3.  Respiratory alkalosis plus anion gap metabolic acidosis. Control  blood glucose. Support blood pressure. 4.  Rhabdomyolysis. Hold fibrate and statin for now. IV fluid  administration. 5.  Hypoalbuminemia. 6.  Diabetes mellitus with severely elevated blood glucose. Management  as per Medicine. 7.  The patient is high risk with significant/severe DEVON and multiple  electrolyte abnormalities. Would need close followup. Thank you for this consult.         Génesis Nieto MD    D: 08/07/2020 10:23:50       T: 08/07/2020 10:33:32     [email or web address]  Job#: [id number]     Doc#: [id number]    CC:

## 2020-08-07 NOTE — ED PROVIDER NOTES
Medical: Not on file     Non-medical: Not on file   Tobacco Use    Smoking status: Never Smoker    Smokeless tobacco: Never Used   Substance and Sexual Activity    Alcohol use: Never     Frequency: Never    Drug use: Never    Sexual activity: Not on file   Lifestyle    Physical activity     Days per week: Not on file     Minutes per session: Not on file    Stress: Not on file   Relationships    Social connections     Talks on phone: Not on file     Gets together: Not on file     Attends [employer] service: Not on file     Active member of club or organization: Not on file     Attends meetings of clubs or organizations: Not on file     Relationship status: Not on file    Intimate partner violence     Fear of current or ex partner: Not on file     Emotionally abused: Not on file     Physically abused: Not on file     Forced sexual activity: Not on file   Other Topics Concern    Not on file   Social History Narrative    Not on file     Current Facility-Administered Medications   Medication Dose Route Frequency Provider Last Rate Last Dose    vancomycin (VANCOCIN) 1,750 mg in dextrose 5 % 500 mL IVPB  15 mg/kg Intravenous Once Mau Lozada MD        glucose (GLUTOSE) 40 % oral gel 15 g  15 g Oral PRN Mau Lozada MD        dextrose 50 % IV solution  12.5 g Intravenous PRN Mau Lozada MD        glucagon (rDNA) injection 1 mg  1 mg Intramuscular PRN Mau Lozada MD        dextrose 5 % solution  100 mL/hr Intravenous PRN Mau Lozada MD        insulin regular (HUMULIN R;NOVOLIN R) 100 Units in sodium chloride 0.9 % 100 mL infusion  0.1 Units/kg/hr Intravenous Continuous Mau Lozada MD        sodium chloride flush 0.9 % injection 10 mL  10 mL Intravenous 2 times per day Brice Adams MD        sodium chloride flush 0.9 % injection 10 mL  10 mL Intravenous PRN Brice Adams MD        acetaminophen (TYLENOL) tablet 650 mg  650 mg Oral Q6H PRN Brice Adams MD        Or  acetaminophen (TYLENOL) suppository 650 mg  650 mg Rectal Q6H PRN Sylwia Overall, MD        polyethylene glycol [facility]) packet 17 g  17 g Oral Daily PRN Sylwia Overall, MD        promethazine (PHENERGAN) tablet 12.5 mg  12.5 mg Oral Q6H PRN Sylwia Overall, MD        Or    ondansetron [facility]) injection 4 mg  4 mg Intravenous Q6H PRN Sylwia Overall, MD        enoxaparin (LOVENOX) injection 30 mg  30 mg Subcutaneous Daily Sylwia Overall, MD        famotidine (PEPCID) tablet 20 mg  20 mg Oral BID Sylwai Overall, MD        0.9 % sodium chloride infusion   Intravenous Continuous Chauncey Hill MD        dextrose 50 % IV solution  12.5 g Intravenous PRN Sylwia Overall, MD        potassium chloride 10 mEq/100 mL IVPB (Peripheral Line)  10 mEq Intravenous PRN Sylwia Overall, MD        magnesium sulfate 1 g in dextrose 5% 100 mL IVPB  1 g Intravenous PRN Sylwia Overall, MD        sodium phosphate 10 mmol in dextrose 5 % 250 mL IVPB  10 mmol Intravenous PRN Sylwia Overall, MD        Or    sodium phosphate 15 mmol in dextrose 5 % 250 mL IVPB  15 mmol Intravenous PRN Sylwia Overall, MD        Or    sodium phosphate 20 mmol in dextrose 5 % 500 mL IVPB  20 mmol Intravenous PRN Sylwia Overall, MD        dextrose 5 % and 0.45 % sodium chloride infusion   Intravenous Continuous PRN Sylwia Overall, MD        0.9 % sodium chloride bolus  15 mL/kg Intravenous Once Sylwia Overall, MD        Followed by   Sumaya Lea 0.45 % sodium chloride infusion   Intravenous Continuous Sylwia Overall, MD         Current Outpatient Medications   Medication Sig Dispense Refill    aspirin 81 MG chewable tablet Take 81 mg by mouth daily      atorvastatin (LIPITOR) 40 MG tablet Take 40 mg by mouth daily      ticagrelor (BRILINTA) 90 MG TABS tablet Take 90 mg by mouth 2 times daily      fenofibrate (TRICOR) 145 MG tablet Take 145 mg by mouth daily      furosemide (LASIX) 40 MG tablet Take 40 mg by mouth daily      isosorbide mononitrate (IMDUR) 30 MG extended release tablet Take 30 mg by mouth daily      insulin detemir (LEVEMIR) 100 UNIT/ML injection vial Inject 30 Units into the skin nightly      losartan (COZAAR) 50 MG tablet Take 50 mg by mouth daily      melatonin 3 MG TABS tablet Take 3 mg by mouth daily      metoclopramide (REGLAN) 5 MG tablet Take 5 mg by mouth 4 times daily      metoprolol tartrate (LOPRESSOR) 50 MG tablet Take 50 mg by mouth 2 times daily      insulin regular (HUMULIN R;NOVOLIN R) 100 UNIT/ML injection Inject 10 Units into the skin 3 times daily      OXcarbazepine (TRILEPTAL) 600 MG tablet Take 600 mg by mouth 2 times daily      oxybutynin (DITROPAN) 5 MG tablet Take 5 mg by mouth 2 times daily      sertraline (ZOLOFT) 50 MG tablet Take 50 mg by mouth daily      Cholecalciferol (VITAMIN D3) 125 MCG (5000 UT) TABS Take by mouth      ibuprofen (ADVIL;MOTRIN) 400 MG tablet Take 400 mg by mouth every 8 hours as needed for Pain       Allergies   Allergen Reactions    Seroquel [Quetiapine] Anxiety       REVIEW OF SYSTEMS  10 systems reviewed, pertinent positives per HPI otherwise noted to be negative. PHYSICAL EXAM  BP (!) 111/44   Pulse 91   Temp 100 °F (37.8 °C) (Oral)   Resp 21   Ht 5' 9\" (1.753 m)   Wt 246 lb (111.6 kg)   SpO2 92%   BMI 36.33 kg/m²    GENERAL APPEARANCE: Awake and alert. Cooperative. No distress. HENT: Normocephalic. Atraumatic. Mucous membranes are dry. BACK:      Cervical: no tenderness noted, no midline tenderness      Thoracic: no tenderness noted, no midline tenderness      Lumbar: no tenderness noted, no midline tenderness  NECK: Supple. No meningismus. EYES: PERRL. EOM's grossly intact. HEART/CHEST: Borderline tachycardia, reg rhythm. No murmurs. No chest wall tenderness. LUNGS: Respirations unlabored. Minimal scattered rhonchi, no rales or wheezing. Good air exchange. Speaking comfortably in full sentences. ABDOMEN: No tenderness. Soft. Non-distended. No masses. No organomegaly. No guarding or rebound.  Normal bowel sounds throughout. MUSCULOSKELETAL: No extremity edema. Compartments soft. No deformity. No tenderness in the extremities except L proximal thigh. All extremities neurovascularly intact. SKIN: Warm and dry. Left inguinal area has an open draining wound with some purulence in the left proximal thigh has redness warmth and induration noted. There is some left proximal thigh crepitus but no fluctuance. No sacral wound noted. NEUROLOGICAL: Alert and oriented. CN's 2-12 intact. No gross facial drooping. Strength 5/5, sensation intact. 2 plus DTR's in knees bilaterally. Gait not assessed. PSYCHIATRIC: Normal mood and affect. LABS  I have reviewed all labs for this visit.    Results for orders placed or performed during the hospital encounter of 08/07/20   CBC auto differential   Result Value Ref Range    WBC 18.8 (H) 4.0 - 11.0 K/uL    RBC 3.27 (L) 4.00 - 5.20 M/uL    Hemoglobin 9.7 (L) 12.0 - 16.0 g/dL    Hematocrit 30.9 (L) 36.0 - 48.0 %    MCV 94.4 80.0 - 100.0 fL    MCH 29.7 26.0 - 34.0 pg    MCHC 31.5 31.0 - 36.0 g/dL    RDW 14.7 12.4 - 15.4 %    Platelets 734 022 - 051 K/uL    MPV 7.6 5.0 - 10.5 fL    PLATELET SLIDE REVIEW Adequate     SLIDE REVIEW see below     Neutrophils % 88.0 %    Lymphocytes % 2.0 %    Monocytes % 6.0 %    Eosinophils % 0.0 %    Basophils % 0.0 %    Neutrophils Absolute 17.3 (H) 1.7 - 7.7 K/uL    Lymphocytes Absolute 0.4 (L) 1.0 - 5.1 K/uL    Monocytes Absolute 1.1 0.0 - 1.3 K/uL    Eosinophils Absolute 0.0 0.0 - 0.6 K/uL    Basophils Absolute 0.0 0.0 - 0.2 K/uL    Bands Relative 4 0 - 7 %   Comprehensive Metabolic Panel w/ Reflex to MG   Result Value Ref Range    Sodium 119 (LL) 136 - 145 mmol/L    Potassium reflex Magnesium 4.5 3.5 - 5.1 mmol/L    Chloride 83 (L) 99 - 110 mmol/L    CO2 18 (L) 21 - 32 mmol/L    Anion Gap 18 (H) 3 - 16    Glucose 853 (HH) 70 - 99 mg/dL    BUN 59 (H) 7 - 20 mg/dL    CREATININE 2.3 (H) 0.6 - 1.2 mg/dL    GFR Non-African American 21 (A) >60    GFR  25 (A) >60    Calcium 8.5 8.3 - 10.6 mg/dL    Total Protein 6.3 (L) 6.4 - 8.2 g/dL    Alb 2.9 (L) 3.4 - 5.0 g/dL    Albumin/Globulin Ratio 0.9 (L) 1.1 - 2.2    Total Bilirubin 0.9 0.0 - 1.0 mg/dL    Alkaline Phosphatase 82 40 - 129 U/L    ALT 38 10 - 40 U/L    AST 44 (H) 15 - 37 U/L    Globulin 3.4 g/dL   Lipase   Result Value Ref Range    Lipase 25.0 13.0 - 60.0 U/L   Troponin   Result Value Ref Range    Troponin <0.01 <0.01 ng/mL   Blood gas, venous   Result Value Ref Range    pH, Dae 7.439 7.350 - 7.450    pCO2, Dae 26.7 (L) 40.0 - 50.0 mmHg    pO2, Dae 138.0 (H) 25.0 - 40.0 mmHg    HCO3, Venous 18.1 (L) 23.0 - 29.0 mmol/L    Base Excess, Dae -5.0 (L) -3.0 - 3.0 mmol/L    O2 Sat, Dae 98 Not Established %    Carboxyhemoglobin 4.2 (H) 0.0 - 1.5 %    MetHgb, Dae 0.3 <1.5 %    TC02 (Calc), Dae 42 Not Established mmol/L    O2 Content, Dae 14 Not Established VOL %    O2 Therapy Unknown    Beta-Hydroxybutyrate   Result Value Ref Range    Beta-Hydroxybutyrate 0.30 (H) 0.00 - 0.27 mmol/L   Urinalysis Reflex to Culture    Specimen: Urine, clean catch   Result Value Ref Range    Color, UA Yellow Straw/Yellow    Clarity, UA Clear Clear    Glucose, Ur 500 (A) Negative mg/dL    Bilirubin Urine Negative Negative    Ketones, Urine Negative Negative mg/dL    Specific Gravity, UA 1.010 1.005 - 1.030    Blood, Urine TRACE-INTACT (A) Negative    pH, UA 5.0 5.0 - 8.0    Protein, UA Negative Negative mg/dL    Urobilinogen, Urine 1.0 <2.0 E.U./dL    Nitrite, Urine Negative Negative    Leukocyte Esterase, Urine Negative Negative    Microscopic Examination YES     Urine Type NotGiven     Urine Reflex to Culture Not Indicated    Lactic Acid, Plasma   Result Value Ref Range    Lactic Acid 3.1 (H) 0.4 - 2.0 mmol/L   Protime-INR   Result Value Ref Range    Protime 15.0 (H) 10.0 - 13.2 sec    INR 1.29 (H) 0.86 - 1.14   Brain Natriuretic Peptide   Result Value Ref Range    Pro-BNP 1,468 (H) 0 - 124 pg/mL   CK   Result Value Ref Range    Total  (H) 26 - 192 U/L   Microscopic Urinalysis   Result Value Ref Range    WBC, UA 3-5 0 - 5 /HPF    RBC, UA 0-2 0 - 4 /HPF    Epithelial Cells, UA 6-10 (A) 0 - 5 /HPF    Bacteria, UA 3+ (A) None Seen /HPF   POCT Glucose   Result Value Ref Range    POC Glucose >600 (AA) 70 - 99 mg/dl    Performed on ACCU-CHEK    EKG 12 Lead   Result Value Ref Range    Ventricular Rate 101 BPM    Atrial Rate 101 BPM    P-R Interval 182 ms    QRS Duration 104 ms    Q-T Interval 352 ms    QTc Calculation (Bazett) 456 ms    P Axis 58 degrees    R Axis -16 degrees    T Axis 53 degrees    Diagnosis Sinus tachycardiaOtherwise normal ECG      The 12 lead EKG was interpreted by me as follows:  Rate: tachycardia with a rate of 101  Rhythm: sinus  Axis: normal  Intervals: normal OK, narrow QRS, normal QTc  ST segments: no ST elevations or depressions  T waves: no abnormal inversions  Non-specific T wave changes: not present  Prior EKG comparison: No prior is currently available for comparison    RADIOLOGY    Ct Abdomen Pelvis Wo Contrast Additional Contrast? None    Result Date: 8/7/2020  EXAMINATION: CT OF THE ABDOMEN AND PELVIS WITHOUT CONTRAST 8/7/2020 9:35 am TECHNIQUE: CT of the abdomen and pelvis was performed without the administration of intravenous contrast. Multiplanar reformatted images are provided for review. Dose modulation, iterative reconstruction, and/or weight based adjustment of the mA/kV was utilized to reduce the radiation dose to as low as reasonably achievable. COMPARISON: None. HISTORY: ORDERING SYSTEM PROVIDED HISTORY: L inguinal infection TECHNOLOGIST PROVIDED HISTORY: Reason for exam:->L inguinal infection Additional Contrast?->None Reason for Exam: L inguinal infection Acuity: Unknown Type of Exam: Unknown FINDINGS: Lower Chest: Atelectasis in the lung bases. Small pericardial effusion Organs: Atrophic left kidney.   Mild left hydroureteronephrosis to the level of the distal ureter with no stone at the transition. Small left upper pole renal stone. Remaining solid organs have an unremarkable noncontrast appearance. Stones in the gallbladder GI/Bowel: No gastrointestinal abnormality demonstrated. Pelvis: Uterus surgically absent. Llanos catheter in the urinary bladder. No pelvic fluid Peritoneum/Retroperitoneum: No ascites or pneumoperitoneum. Aorta normal in caliber Bones/Soft Tissues: Subcutaneous gas and soft tissue infiltration in the medial and anterior thigh extending into the left groin region. No acute bony abnormality     1. Gas-forming cellulitis of the upper left thigh and groin 2. Chronic left hydronephrosis with renal atrophy, distal ureteral stricture suspected 3. Left nephrolithiasis     Ct Head Wo Contrast    Result Date: 8/7/2020  EXAMINATION: CT OF THE HEAD WITHOUT CONTRAST  8/7/2020 8:22 am TECHNIQUE: CT of the head was performed without the administration of intravenous contrast. Dose modulation, iterative reconstruction, and/or weight based adjustment of the mA/kV was utilized to reduce the radiation dose to as low as reasonably achievable. COMPARISON: None. HISTORY: ORDERING SYSTEM PROVIDED HISTORY: falls TECHNOLOGIST PROVIDED HISTORY: Reason for exam:->falls Has a \"code stroke\" or \"stroke alert\" been called? ->No Reason for Exam: Fall [facility], Pt had fall yester day, lost ballance while putting food in refigerator.) Acuity: Unknown Type of Exam: Unknown FINDINGS: BRAIN/VENTRICLES: There is no acute intracranial hemorrhage, mass effect or midline shift. No abnormal extra-axial fluid collection. The gray-white differentiation is maintained without evidence of an acute infarct. There is no evidence of hydrocephalus. ORBITS: The visualized portion of the orbits demonstrate no acute abnormality. SINUSES: The visualized paranasal sinuses and mastoid air cells demonstrate no acute abnormality. SOFT TISSUES/SKULL:  No acute abnormality of the visualized skull or soft tissues. Small left-sided pleural effusion with overlying consolidation may be secondary to atelectasis versus left lower lobe pneumonia. Ct Femur Left Wo Contrast    Result Date: 8/7/2020  EXAMINATION: CT OF THE LEFT FEMUR WITHOUT CONTRAST 8/7/2020 9:35 am TECHNIQUE: CT of the left femur was performed without the administration of intravenous contrast.  Multiplanar reformatted images are provided for review. Dose modulation, iterative reconstruction, and/or weight based adjustment of the mA/kV was utilized to reduce the radiation dose to as low as reasonably achievable. COMPARISON: CT abdomen and pelvis today. HISTORY ORDERING SYSTEM PROVIDED HISTORY: proximal infection soft tissue medially TECHNOLOGIST PROVIDED HISTORY: Reason for exam:->proximal infection soft tissue medially Reason for Exam: proximal infection soft tissue medially Acuity: Unknown Type of Exam: Unknown Thigh wound for 1 month with 1 cm ulcer in the inguinal crease. FINDINGS: Soft tissue: The ulcer in inguinal fold is not visualized at CT. Soft tissue gas is present with surrounding edema extending from the medial proximal thigh soft tissues anteriorly in the proximal thigh and extending laterally into the superficial soft tissues around the hip to the level of the anterosuperior iliac spine. No intramuscular fluid collection or intramuscular gas in the left thigh. Muscle bulk of the left thigh is normal.  No muscle atrophy or edema. Pelvic contents is reported separately on the CT abdomen and pelvis earlier today. Bones: No lytic or blastic osseous lesion. No acute periostitis. The left femur is intact. Joints: No pathologic left hip joint effusion. Mild left hip and sacroiliac degenerative changes. Soft tissue emphysema in the proximal medial thigh extending into the anterior proximal thigh and laterally around the hip to the level of the anterosuperior iliac spine.   Findings could relate to the patient's clinical ulcer or gas-forming soft tissue infection. No intramuscular gas in the thigh. No abscess or fluid collection. Findings were discussed with Yvan LATHAM at 10:26 am on 8/7/2020. ED COURSE/MDM  Patient seen and evaluated. Old records reviewed. Labs and imaging reviewed and results discussed with patient. After initial evaluation, differential diagnostic considerations included: stroke, TIA, intracranial bleed, trauma, infection/sepsis, medication side effect, intoxication/withdrawal, metabolic/electrolyte abnormalities    The patient's ED workup was notable for significant hyperglycemia with anion gap. I am told that the beta hydroxybutyrate machine is currently not operational but suspect DKA and this result is pending. Lactate is also elevated with a leukocytosis and possible pneumonia on imaging with low-grade fevers arguing towards severe sepsis. COVID swab is pending. Patient will be covered empirically for nosocomial infection with vancomycin cefepime and azithromycin for now, urine cultures obtained. Patient's low sodium is likely pseudohyponatremia related to hyperglycemia. Creatinine of 2.3 is stable for the patient based on previous testing. Skin exam demonstrates concern for left inguinal open draining wound and left proximal thigh cellulitis. Therefore I did obtain a CT of the abdomen and pelvis as well as her left femur demonstrating gas-forming cellulitis. She tells me that this infection has been brewing for a month. Gas could potentially be from open L inguinal wound. By history, necrotizing fasciitis would likely not be as indolent as this infection has been. Wound culture obtained. Roxana morgan with Dr. Anna Reyes from general surgery was consulted about the patient's ED history, physical, workup, and course so far. Recommendations from this consultant included they will evaluate the patient.     No visible evidence of trauma on her exam and no tenderness in the extremities, cervical thoracic lumbar spine, no evidence of a head injury. CT of the head and cervical spine was nonetheless obtained showing no acute traumatic findings. No complaints of focalizing pain related any of her falls. Swabbing for COVID-19 based on being from [facility]. No cough/cold sx but does have low-grade fevers per report and is 100F here. Dr. Vic Vitale from [facility] ED was consulted about the patient's ED history, physical, workup, and course so far. Recommendations from this consultant included he will evaluate in ICU. During every aspect of this patient encounter, full droplet plus PPE precautions were used by myself. SEP-1 CORE MEASURE DATA    Classification: severe sepsis    Amount of fluids ordered: at least 30mL/kg based on ideal body weight due to obesity defined as BMI >30 (patient's BMI is Body mass index is 36.33 kg/m².  and IBW is Ideal body weight: 66.2 kg (145 lb 15.1 oz)Adjusted ideal body weight: 84.4 kg (185 lb 15.5 oz)) (~1980cc fluid per IBW, so 2000cc ordered for rounding purposes)    Time at which sepsis was identified: 0900    Broad-spectrum antibiotics chosen: vanc/cefepime/azithromycin based on suspected source of: Pulmonary - Nosocomial    Repeat lactate level: pending    On reassessment after fluid resuscitation:   I have reassessed tissue perfusion and hemodynamic status after fluid bolus    During the patient's ED course, the patient was given:  Medications   vancomycin (VANCOCIN) 1,750 mg in dextrose 5 % 500 mL IVPB (has no administration in time range)   glucose (GLUTOSE) 40 % oral gel 15 g (has no administration in time range)   dextrose 50 % IV solution (has no administration in time range)   glucagon (rDNA) injection 1 mg (has no administration in time range)   dextrose 5 % solution (has no administration in time range)   insulin regular (HUMULIN R;NOVOLIN R) 100 Units in sodium chloride 0.9 % 100 mL infusion (has no administration in time range)   sodium chloride flush 0.9 % injection 10 mL (has no administration in time range)   sodium chloride flush 0.9 % injection 10 mL (has no administration in time range)   acetaminophen (TYLENOL) tablet 650 mg (has no administration in time range)     Or   acetaminophen (TYLENOL) suppository 650 mg (has no administration in time range)   polyethylene glycol (GLYCOLAX) packet 17 g (has no administration in time range)   promethazine (PHENERGAN) tablet 12.5 mg (has no administration in time range)     Or   ondansetron (ZOFRAN) injection 4 mg (has no administration in time range)   enoxaparin (LOVENOX) injection 30 mg (has no administration in time range)   famotidine (PEPCID) tablet 20 mg (has no administration in time range)   0.9 % sodium chloride infusion (has no administration in time range)   dextrose 50 % IV solution (has no administration in time range)   potassium chloride 10 mEq/100 mL IVPB (Peripheral Line) (has no administration in time range)   magnesium sulfate 1 g in dextrose 5% 100 mL IVPB (has no administration in time range)   sodium phosphate 10 mmol in dextrose 5 % 250 mL IVPB (has no administration in time range)     Or   sodium phosphate 15 mmol in dextrose 5 % 250 mL IVPB (has no administration in time range)     Or   sodium phosphate 20 mmol in dextrose 5 % 500 mL IVPB (has no administration in time range)   dextrose 5 % and 0.45 % sodium chloride infusion (has no administration in time range)   0.9 % sodium chloride bolus (has no administration in time range)     Followed by   0.45 % sodium chloride infusion (has no administration in time range)   acetaminophen (TYLENOL) tablet 1,000 mg (1,000 mg Oral Given 8/7/20 0853)   0.9 % sodium chloride infusion (2,000 mLs Intravenous New Bag 8/7/20 0853)   cefepime (MAXIPIME) 2 g IVPB minibag (0 g Intravenous Stopped 8/7/20 0930)   azithromycin (ZITHROMAX) 500 mg in D5W 250ml Vial Mate (0 mg Intravenous Stopped 8/7/20 1041)        CLINICAL IMPRESSION  1.  Diabetic ketoacidosis without coma associated with type 2 diabetes mellitus ([facility] [address]    2. General weakness    3. Severe sepsis ([facility] [address]    4. Pneumonia due to organism    5. Suspected COVID-19 virus infection    6. Cellulitis of left groin        Blood pressure (!) 111/44, pulse 91, temperature 100 °F (37.8 °C), temperature source Oral, resp. rate 21, height 5' 9\" (1.753 m), weight 246 lb (111.6 kg), SpO2 92 %. Jeremie Crowell was admitted in fair condition. The plan is to admit to the hospital at this time under the hospitalist service. ICU physician accepted the patient and will take over the patient's care. The total critical care time spent while evaluating and treating this patient was at least 75 minutes. This excludes time spent doing separately billable procedures. This includes time at the bedside, data interpretation, medication management, obtaining critical history from collateral sources if the patient is unable to provide it directly, and physician consultation. Specifics of interventions taken and potentially life-threatening diagnostic considerations are listed above in the medical decision making. DISCLAIMER: This chart was created using Dragon dictation software. Efforts were made by me to ensure accuracy, however some errors may be present due to limitations of this technology and occasionally words are not transcribed correctly.         Jody Villagomez MD  08/07/20 [address] [place] [address], MD  08/07/20 1057

## 2020-08-07 NOTE — PROGRESS NOTES
Pt informed of surgical procedure by Dr. Osvaldo Butler at bedside, informed consent signed and on chart.

## 2020-08-07 NOTE — PROGRESS NOTES
Pt prepped for surgery in room 5913, pt will be transported down to [facility] for I &D with surgical team

## 2020-08-07 NOTE — PLAN OF CARE
[facility]        Assessment & Plan of Care    History of Present Illness: Ms. Nikki Naik is a 78 y.o. female who presented to the ED from a nursing home today with complaints of fatigue and generalized weakness for which she has had balance issues, trouble with walking, and even a nontraumatic fall. She also reports nausea, vomiting, and anorexia. We were asked to evaluate the patient for cellulitis in the left thigh/groin area. The patient reports a \"wound\" in this area for about the last month that has gradually but progressively worsened. She describes the pain as constant, achy, and severe upon presentation to the ED today, rating it a 9 out of 10. She denies any injury in that area and is unsure of an drainage from the site, but reports a small amount of bleeding. She denies that she has received any wound care or antibiotics for site. Denies prior fevers but did have a temperature of 100 upon admission today. Denies chest pain, SOB, diarrhea, bloody stools, or any similar wounds in the past.  Prior abdominal surgeries include  sections x2 and a hysterectomy, no surgeries in the groin area. Medical history includes diabetes, hypertension, CAD with prior stents, medical coagulapathy on Brilinita (last took today), kidney stones, and chronic kidney disease--reports only 1 functioning kidney. Nonsmoker.     Physical Exam:  CONSTITUTIONAL:  alert, no apparent distress and moderately obese  NEUROLOGIC:  Mental Status Exam:  Level of Alertness:   awake  Orientation:   person, place, time  EYES:  sclera clear  ENT:  normocepalic, without obvious abnormality  NECK:  supple, symmetrical, trachea midline  LUNGS:  clear to auscultation  CARDIOVASCULAR:  regular rate and rhythm and no murmur noted  ABDOMEN: soft, non-distended, non-tender, normal bowel sounds, and hernia present--large ventral hernia  Extremities: no edema  SKIN: left upper thigh and groin cellulitis with crepitus without open wound    Assessment:  Left thigh/groin cellulitis, possible necrotizing fascitis   DKA  Lactic acidosis  Pneumonia  Hyponatremia  Hypotension  Acute kidney injury on CKD, stage 3  Anemia  Medical coagulapathy, on Brilinta (last dose on 8/7/2020)    Plan:  1. Incision and drainage, possible debridement of left upper thigh and groin abscess with Dr. Lisha Boss  2. NPO  3. IV hydration; monitor and correct electrolytes--Nephrology following  4. Antibiotics  5. PRN analgesics and antiemetics--minimizing narcotics as tolerated  6. DVT prophylaxis with Lovenox and SCD's--reportedly took Brilinta this morning prior to admission  7. Management of medical comorbid etiologies per primary team and consulting services    EDUCATION:  Educated patient on plan of care and disease process--all questions answered. Plans discussed with patient and nursing. Reviewed and discussed with Dr. Jeremías Carson consult to follow.       Signed:  CHAUNCEY Morales - CNP  8/7/2020 10:25 AM     Necrotizing left groin infection  To OR for debridement

## 2020-08-07 NOTE — BRIEF OP NOTE
Brief Postoperative Note      Patient: Mich Drake  YOB: 1950  MRN: [id number]    Date of Procedure: 8/7/2020    Pre-Op Diagnosis: necrotizing L groin infection    Post-Op Diagnosis: Same       Procedure(s):  Excisional debridement of skin/SQ/muscle left thigh 345 sq cm    Surgeon(s):  Leyla Horton MD    Assistant:  Surgical Assistant: Brian Carbone    Anesthesia: General    Estimated Blood Loss (mL): less than 50     Complications: None    Specimens:   * No specimens in log *    Implants:  * No implants in log *      Drains:   Urethral Catheter Temperature probe (Active)   Catheter Indications Need for fluid management in critically ill patients in a critical care setting not able to be managed by other means such as BSC with hat, bedpan, urinal, condom catheter, or short term intermittent urethral catherization 08/07/20 1600   Site Assessment No urethral drainage 08/07/20 1600   Urine Color Yellow 08/07/20 1600   Urine Appearance Clear 08/07/20 1600   Urine Odor Other (Comment) 08/07/20 1600   Output (mL) 1500 mL 08/07/20 1500       Findings: necrotizing L groin infection     Electronically signed by Leyla Horton MD on 8/7/2020 at 5:48 PM

## 2020-08-07 NOTE — PROGRESS NOTES
4 Eyes Skin Assessment     The patient is being assess for  Admission    I agree that 2 RN's have performed a thorough Head to Toe Skin Assessment on the patient. ALL assessment sites listed below have been assessed. Areas assessed by both nurses:   [x]   Head, Face, and Ears   [x]   Shoulders, Back, and Chest  [x]   Arms, Elbows, and Hands   [x]   Coccyx, Sacrum, and IschIum  [x]   Legs, Feet, and Heels        Does the Patient have Skin Breakdown?   Yes LDA WOUND CARE was Initiated documentation include the Denise-wound, Wound Assessment, Measurements, Dressing Treatment, Drainage, and Color\",         Michael Prevention initiated:  Yes   Wound Care Orders initiated:  No      WOC nurse consulted for Pressure Injury (Stage 3,4, Unstageable, DTI, NWPT, and Complex wounds), New and Established Ostomies:  No      Nurse 1 eSignature: Electronically signed by Seb Lara RN on 8/7/20 at 6:53 PM EDT    **SHARE this note so that the co-signing nurse is able to place an eSignature**    Nurse 2 eSignature: Electronically signed by Ivett Ramos RN on 8/7/20 at 6:54 PM EDT

## 2020-08-07 NOTE — ED NOTES
Presents to the ED from assisted living rt increased weakness. Pt reports feeling weak for two weeks resulting in decreased mobility & falls; intermittent low grade fevers. Monitors applied & call light in reach.        Rakan Zepeda RN  08/07/20 1636

## 2020-08-07 NOTE — PROGRESS NOTES
Consult received from Medicine    Notes and labs reviewed  Full note to follow  Crea was 1.4 12/2019  On Lasix/Losartan/Ibuprofen PRN as outpt  Elevated blood glucose    Thank you

## 2020-08-07 NOTE — CONSULTS
Renal Consult  Full Note Dictated [id number]  A/P  1. DEVON on CKD 3 - baseline crea 1.4. DEVON probably prerenal azotemia(hypotension+hyperglycemia+ARB+Lasix). At risk for ATN in setting of infection. Receiving 2LNSS bolus. Will use NSS as maintenance IVF  2. Hyponatremia- corrected Na around 129-130. Would not correct more than 8meq in 24 hours or 16meq/48 hours. BMP every 4 hours. 3.   Hypotension-hold ARB, Lasix. IVF. 4    Respiratory Alkalosis+AGMA- blood glucose control+IVF. Follow bp.   5.   DM- very elevated blood glucose. Per Medicine  6    Extensive Soft Tissue Emphysema Left Thigh-Cefepime and Vanco.  Follow Vanco level. Elevated CK. 7.   Rhabdomyolysis- hold fibrate and statin for now. 8.   Anemia- follow Hgb  9. Obesity    High risk . Would need close f/u.

## 2020-08-08 LAB
ANION GAP SERPL CALCULATED.3IONS-SCNC: 10 MMOL/L (ref 3–16)
ANION GAP SERPL CALCULATED.3IONS-SCNC: 15 MMOL/L (ref 3–16)
BUN BLDV-MCNC: 45 MG/DL (ref 7–20)
BUN BLDV-MCNC: 48 MG/DL (ref 7–20)
CALCIUM SERPL-MCNC: 7.3 MG/DL (ref 8.3–10.6)
CALCIUM SERPL-MCNC: 7.6 MG/DL (ref 8.3–10.6)
CHLORIDE BLD-SCNC: 100 MMOL/L (ref 99–110)
CHLORIDE BLD-SCNC: 96 MMOL/L (ref 99–110)
CO2: 16 MMOL/L (ref 21–32)
CO2: 20 MMOL/L (ref 21–32)
CREAT SERPL-MCNC: 1.7 MG/DL (ref 0.6–1.2)
CREAT SERPL-MCNC: 1.7 MG/DL (ref 0.6–1.2)
GFR AFRICAN AMERICAN: 36
GFR AFRICAN AMERICAN: 36
GFR NON-AFRICAN AMERICAN: 30
GFR NON-AFRICAN AMERICAN: 30
GLUCOSE BLD-MCNC: 138 MG/DL (ref 70–99)
GLUCOSE BLD-MCNC: 167 MG/DL (ref 70–99)
GLUCOSE BLD-MCNC: 175 MG/DL (ref 70–99)
GLUCOSE BLD-MCNC: 181 MG/DL (ref 70–99)
GLUCOSE BLD-MCNC: 182 MG/DL (ref 70–99)
GLUCOSE BLD-MCNC: 192 MG/DL (ref 70–99)
GLUCOSE BLD-MCNC: 193 MG/DL (ref 70–99)
GLUCOSE BLD-MCNC: 197 MG/DL (ref 70–99)
GLUCOSE BLD-MCNC: 211 MG/DL (ref 70–99)
GLUCOSE BLD-MCNC: 211 MG/DL (ref 70–99)
GLUCOSE BLD-MCNC: 221 MG/DL (ref 70–99)
GLUCOSE BLD-MCNC: 226 MG/DL (ref 70–99)
GLUCOSE BLD-MCNC: 228 MG/DL (ref 70–99)
GLUCOSE BLD-MCNC: 322 MG/DL (ref 70–99)
HCT VFR BLD CALC: 25.4 % (ref 36–48)
HEMOGLOBIN: 8.4 G/DL (ref 12–16)
MAGNESIUM: 1.9 MG/DL (ref 1.8–2.4)
MAGNESIUM: 2.1 MG/DL (ref 1.8–2.4)
OSMOLALITY URINE: 376 MOSM/KG (ref 390–1070)
PERFORMED ON: ABNORMAL
PHOSPHORUS: 2.7 MG/DL (ref 2.5–4.9)
PHOSPHORUS: 3 MG/DL (ref 2.5–4.9)
POTASSIUM SERPL-SCNC: 3.9 MMOL/L (ref 3.5–5.1)
POTASSIUM SERPL-SCNC: 4.7 MMOL/L (ref 3.5–5.1)
SODIUM BLD-SCNC: 127 MMOL/L (ref 136–145)
SODIUM BLD-SCNC: 130 MMOL/L (ref 136–145)
SODIUM URINE: <20 MMOL/L
TOTAL CK: 126 U/L (ref 26–192)

## 2020-08-08 PROCEDURE — 6360000002 HC RX W HCPCS: Performed by: STUDENT IN AN ORGANIZED HEALTH CARE EDUCATION/TRAINING PROGRAM

## 2020-08-08 PROCEDURE — 6360000002 HC RX W HCPCS

## 2020-08-08 PROCEDURE — 99024 POSTOP FOLLOW-UP VISIT: CPT | Performed by: SURGERY

## 2020-08-08 PROCEDURE — 82550 ASSAY OF CK (CPK): CPT

## 2020-08-08 PROCEDURE — 36415 COLL VENOUS BLD VENIPUNCTURE: CPT

## 2020-08-08 PROCEDURE — 2500000003 HC RX 250 WO HCPCS: Performed by: INTERNAL MEDICINE

## 2020-08-08 PROCEDURE — 6370000000 HC RX 637 (ALT 250 FOR IP): Performed by: INTERNAL MEDICINE

## 2020-08-08 PROCEDURE — 2580000003 HC RX 258: Performed by: INTERNAL MEDICINE

## 2020-08-08 PROCEDURE — 2500000003 HC RX 250 WO HCPCS: Performed by: STUDENT IN AN ORGANIZED HEALTH CARE EDUCATION/TRAINING PROGRAM

## 2020-08-08 PROCEDURE — 99232 SBSQ HOSP IP/OBS MODERATE 35: CPT | Performed by: INTERNAL MEDICINE

## 2020-08-08 PROCEDURE — 6370000000 HC RX 637 (ALT 250 FOR IP): Performed by: SURGERY

## 2020-08-08 PROCEDURE — 6360000002 HC RX W HCPCS: Performed by: SURGERY

## 2020-08-08 PROCEDURE — 2500000003 HC RX 250 WO HCPCS: Performed by: SURGERY

## 2020-08-08 PROCEDURE — 6370000000 HC RX 637 (ALT 250 FOR IP): Performed by: EMERGENCY MEDICINE

## 2020-08-08 PROCEDURE — 84100 ASSAY OF PHOSPHORUS: CPT

## 2020-08-08 PROCEDURE — 36569 INSJ PICC 5 YR+ W/O IMAGING: CPT

## 2020-08-08 PROCEDURE — 83735 ASSAY OF MAGNESIUM: CPT

## 2020-08-08 PROCEDURE — 6370000000 HC RX 637 (ALT 250 FOR IP): Performed by: STUDENT IN AN ORGANIZED HEALTH CARE EDUCATION/TRAINING PROGRAM

## 2020-08-08 PROCEDURE — 6370000000 HC RX 637 (ALT 250 FOR IP): Performed by: FAMILY MEDICINE

## 2020-08-08 PROCEDURE — 1200000000 HC SEMI PRIVATE

## 2020-08-08 PROCEDURE — 80048 BASIC METABOLIC PNL TOTAL CA: CPT

## 2020-08-08 PROCEDURE — 2580000003 HC RX 258: Performed by: EMERGENCY MEDICINE

## 2020-08-08 PROCEDURE — 2580000003 HC RX 258: Performed by: STUDENT IN AN ORGANIZED HEALTH CARE EDUCATION/TRAINING PROGRAM

## 2020-08-08 PROCEDURE — 2580000003 HC RX 258: Performed by: SURGERY

## 2020-08-08 PROCEDURE — 02HV33Z INSERTION OF INFUSION DEVICE INTO SUPERIOR VENA CAVA, PERCUTANEOUS APPROACH: ICD-10-PCS | Performed by: INTERNAL MEDICINE

## 2020-08-08 PROCEDURE — 85018 HEMOGLOBIN: CPT

## 2020-08-08 PROCEDURE — 85014 HEMATOCRIT: CPT

## 2020-08-08 PROCEDURE — C1751 CATH, INF, PER/CENT/MIDLINE: HCPCS

## 2020-08-08 PROCEDURE — 94761 N-INVAS EAR/PLS OXIMETRY MLT: CPT

## 2020-08-08 RX ORDER — ATORVASTATIN CALCIUM 40 MG/1
40 TABLET, FILM COATED ORAL NIGHTLY
Status: DISCONTINUED | OUTPATIENT
Start: 2020-08-08 | End: 2020-08-08

## 2020-08-08 RX ORDER — SODIUM CHLORIDE 1000 MG
1 TABLET, SOLUBLE MISCELLANEOUS
Status: DISCONTINUED | OUTPATIENT
Start: 2020-08-08 | End: 2020-08-12

## 2020-08-08 RX ORDER — FENOFIBRATE 160 MG/1
160 TABLET ORAL DAILY
Status: DISCONTINUED | OUTPATIENT
Start: 2020-08-08 | End: 2020-08-08

## 2020-08-08 RX ORDER — INSULIN LISPRO 100 [IU]/ML
10 INJECTION, SOLUTION INTRAVENOUS; SUBCUTANEOUS
Status: DISCONTINUED | OUTPATIENT
Start: 2020-08-08 | End: 2020-08-12 | Stop reason: HOSPADM

## 2020-08-08 RX ORDER — FENOFIBRATE 54 MG/1
54 TABLET ORAL DAILY
Status: DISCONTINUED | OUTPATIENT
Start: 2020-08-08 | End: 2020-08-08 | Stop reason: DRUGHIGH

## 2020-08-08 RX ORDER — SODIUM CHLORIDE 0.9 % (FLUSH) 0.9 %
10 SYRINGE (ML) INJECTION PRN
Status: DISCONTINUED | OUTPATIENT
Start: 2020-08-08 | End: 2020-08-12 | Stop reason: HOSPADM

## 2020-08-08 RX ORDER — ATORVASTATIN CALCIUM 40 MG/1
40 TABLET, FILM COATED ORAL DAILY
Status: DISCONTINUED | OUTPATIENT
Start: 2020-08-08 | End: 2020-08-08

## 2020-08-08 RX ORDER — LANOLIN ALCOHOL/MO/W.PET/CERES
3 CREAM (GRAM) TOPICAL DAILY
Status: DISCONTINUED | OUTPATIENT
Start: 2020-08-08 | End: 2020-08-08

## 2020-08-08 RX ORDER — ASPIRIN 81 MG/1
81 TABLET, CHEWABLE ORAL DAILY
Status: DISCONTINUED | OUTPATIENT
Start: 2020-08-08 | End: 2020-08-12 | Stop reason: HOSPADM

## 2020-08-08 RX ORDER — METOCLOPRAMIDE 10 MG/1
5 TABLET ORAL 4 TIMES DAILY
Status: DISCONTINUED | OUTPATIENT
Start: 2020-08-08 | End: 2020-08-12 | Stop reason: HOSPADM

## 2020-08-08 RX ORDER — LIDOCAINE HYDROCHLORIDE 10 MG/ML
5 INJECTION, SOLUTION EPIDURAL; INFILTRATION; INTRACAUDAL; PERINEURAL ONCE
Status: COMPLETED | OUTPATIENT
Start: 2020-08-08 | End: 2020-08-08

## 2020-08-08 RX ORDER — LANOLIN ALCOHOL/MO/W.PET/CERES
3 CREAM (GRAM) TOPICAL NIGHTLY
Status: DISCONTINUED | OUTPATIENT
Start: 2020-08-08 | End: 2020-08-12 | Stop reason: HOSPADM

## 2020-08-08 RX ORDER — FUROSEMIDE 20 MG/1
20 TABLET ORAL 2 TIMES DAILY
Status: COMPLETED | OUTPATIENT
Start: 2020-08-08 | End: 2020-08-10

## 2020-08-08 RX ORDER — SODIUM CHLORIDE 0.9 % (FLUSH) 0.9 %
10 SYRINGE (ML) INJECTION EVERY 12 HOURS SCHEDULED
Status: DISCONTINUED | OUTPATIENT
Start: 2020-08-08 | End: 2020-08-12 | Stop reason: HOSPADM

## 2020-08-08 RX ADMIN — FAMOTIDINE 20 MG: 20 TABLET, FILM COATED ORAL at 10:17

## 2020-08-08 RX ADMIN — Medication 10 ML: at 22:29

## 2020-08-08 RX ADMIN — INSULIN LISPRO 2 UNITS: 100 INJECTION, SOLUTION INTRAVENOUS; SUBCUTANEOUS at 17:47

## 2020-08-08 RX ADMIN — LIDOCAINE HYDROCHLORIDE 5 ML: 10 INJECTION, SOLUTION EPIDURAL; INFILTRATION; INTRACAUDAL; PERINEURAL at 13:00

## 2020-08-08 RX ADMIN — CEFEPIME HYDROCHLORIDE 2 G: 2 INJECTION, POWDER, FOR SOLUTION INTRAVENOUS at 13:00

## 2020-08-08 RX ADMIN — SODIUM CHLORIDE TAB 1 GM 1 G: 1 TAB at 14:29

## 2020-08-08 RX ADMIN — VANCOMYCIN HYDROCHLORIDE 1000 MG: 1 INJECTION, POWDER, LYOPHILIZED, FOR SOLUTION INTRAVENOUS at 10:17

## 2020-08-08 RX ADMIN — METOCLOPRAMIDE 5 MG: 10 TABLET ORAL at 14:30

## 2020-08-08 RX ADMIN — INSULIN LISPRO 10 UNITS: 100 INJECTION, SOLUTION INTRAVENOUS; SUBCUTANEOUS at 17:47

## 2020-08-08 RX ADMIN — METOCLOPRAMIDE 5 MG: 10 TABLET ORAL at 17:45

## 2020-08-08 RX ADMIN — Medication 10 MEQ: at 00:11

## 2020-08-08 RX ADMIN — ACETAMINOPHEN 650 MG: 325 TABLET, FILM COATED ORAL at 06:23

## 2020-08-08 RX ADMIN — Medication 10 MEQ: at 02:42

## 2020-08-08 RX ADMIN — Medication 10 ML: at 22:28

## 2020-08-08 RX ADMIN — METOCLOPRAMIDE 5 MG: 10 TABLET ORAL at 10:17

## 2020-08-08 RX ADMIN — CLINDAMYCIN IN 5 PERCENT DEXTROSE 600 MG: 12 INJECTION, SOLUTION INTRAVENOUS at 14:30

## 2020-08-08 RX ADMIN — SODIUM CHLORIDE 21.3 UNITS/HR: 9 INJECTION, SOLUTION INTRAVENOUS at 00:28

## 2020-08-08 RX ADMIN — INSULIN GLARGINE 30 UNITS: 100 INJECTION, SOLUTION SUBCUTANEOUS at 04:39

## 2020-08-08 RX ADMIN — INSULIN LISPRO 4 UNITS: 100 INJECTION, SOLUTION INTRAVENOUS; SUBCUTANEOUS at 13:00

## 2020-08-08 RX ADMIN — FUROSEMIDE 20 MG: 20 TABLET ORAL at 14:30

## 2020-08-08 RX ADMIN — MELATONIN TAB 3 MG 3 MG: 3 TAB at 22:25

## 2020-08-08 RX ADMIN — Medication 10 MEQ: at 01:45

## 2020-08-08 RX ADMIN — INSULIN LISPRO 10 UNITS: 100 INJECTION, SOLUTION INTRAVENOUS; SUBCUTANEOUS at 14:30

## 2020-08-08 RX ADMIN — HYDROMORPHONE HYDROCHLORIDE 1 MG: 1 INJECTION, SOLUTION INTRAMUSCULAR; INTRAVENOUS; SUBCUTANEOUS at 14:00

## 2020-08-08 RX ADMIN — METOPROLOL TARTRATE 50 MG: 50 TABLET, FILM COATED ORAL at 22:25

## 2020-08-08 RX ADMIN — INSULIN LISPRO 2 UNITS: 100 INJECTION, SOLUTION INTRAVENOUS; SUBCUTANEOUS at 10:19

## 2020-08-08 RX ADMIN — CEFEPIME HYDROCHLORIDE 2 G: 2 INJECTION, POWDER, FOR SOLUTION INTRAVENOUS at 21:57

## 2020-08-08 RX ADMIN — DEXTROSE AND SODIUM CHLORIDE: 5; 450 INJECTION, SOLUTION INTRAVENOUS at 01:04

## 2020-08-08 RX ADMIN — SODIUM CHLORIDE TAB 1 GM 1 G: 1 TAB at 17:45

## 2020-08-08 RX ADMIN — ENOXAPARIN SODIUM 40 MG: 40 INJECTION SUBCUTANEOUS at 10:18

## 2020-08-08 RX ADMIN — CLINDAMYCIN IN 5 PERCENT DEXTROSE 600 MG: 12 INJECTION, SOLUTION INTRAVENOUS at 06:24

## 2020-08-08 RX ADMIN — CLINDAMYCIN IN 5 PERCENT DEXTROSE 600 MG: 12 INJECTION, SOLUTION INTRAVENOUS at 22:02

## 2020-08-08 RX ADMIN — METOCLOPRAMIDE 5 MG: 10 TABLET ORAL at 22:26

## 2020-08-08 ASSESSMENT — PAIN SCALES - WONG BAKER
WONGBAKER_NUMERICALRESPONSE: 0

## 2020-08-08 ASSESSMENT — PAIN DESCRIPTION - LOCATION
LOCATION: GROIN
LOCATION: GROIN

## 2020-08-08 ASSESSMENT — ENCOUNTER SYMPTOMS
ABDOMINAL DISTENTION: 0
CHEST TIGHTNESS: 0
BLOOD IN STOOL: 0
COUGH: 0
PHOTOPHOBIA: 0
EYE DISCHARGE: 0
DIARRHEA: 0
ABDOMINAL PAIN: 0
EYE REDNESS: 0
CONSTIPATION: 0
NAUSEA: 0
FACIAL SWELLING: 0
VOMITING: 0
SHORTNESS OF BREATH: 0

## 2020-08-08 ASSESSMENT — PAIN SCALES - GENERAL
PAINLEVEL_OUTOF10: 0
PAINLEVEL_OUTOF10: 2
PAINLEVEL_OUTOF10: 0
PAINLEVEL_OUTOF10: 8

## 2020-08-08 ASSESSMENT — PAIN DESCRIPTION - ORIENTATION
ORIENTATION: LEFT
ORIENTATION: LEFT

## 2020-08-08 ASSESSMENT — PAIN DESCRIPTION - PAIN TYPE
TYPE: ACUTE PAIN
TYPE: ACUTE PAIN

## 2020-08-08 NOTE — PROCEDURES
Attempted to place Picc line in RT Upper arm. Noted veins are difficult to follow r/t to many bifurcations. Obtained access x 3 with excellent blood return. Guidewire would not advance each time. Picc line attempt was unsuccessful.

## 2020-08-08 NOTE — PROGRESS NOTES
0439- 30 units Lantus given. 4344- Tylenol given for rectal temp of 100.6.  0706- Insulin regular and dextrose stopped. No longer on DKA protocol.

## 2020-08-08 NOTE — PROGRESS NOTES
Page to hospitalist as follows. Will await any new orders. Pt on dka protocol due to necrotizing left groin infection. BG now 197 and has had two closed gaps and c02 greater than 15 x2. Would you like to discontinue insulin infusion and start home insulin. Takes Levemir 30 units nightly and Insulin regular 10 units three times a day. Please advise. Thanks!

## 2020-08-08 NOTE — OP NOTE
[facility]                     [address] [facility], [address] [place] [address]                                OPERATIVE REPORT    PATIENT NAME: Rebecca Ley                   :        1950  MED REC NO:   [id number]                          ROOM:       8657  ACCOUNT NO:   [de-identified]                           ADMIT DATE: 2020  PROVIDER:     Romeo Murphy MD    DATE OF PROCEDURE:  2020    PREOPERATIVE DIAGNOSIS:  Necrotizing left groin infection. POSTOPERATIVE DIAGNOSIS:  Necrotizing left groin infection. PROCEDURE:  Excisional debridement of skin, subcutaneous tissue and  muscle with a total surface area of 345 cm2. SURGEON:  Lauro Chapman MD    ANESTHESIA:  General endotracheal.    ESTIMATED BLOOD LOSS:  Less than 50 mL. COMPLICATIONS:  None. SPECIMEN:  Skin, soft tissue and fascia. OPERATIVE INDICATIONS AND CONSENT:  The patient is a 50-year-old female,  who presented to the hospital in diabetic ketoacidosis. Her lactic acid  level was elevated, as was her white blood count. CAT scan of the left  thigh region showed air within the tissue. On physical examination, she  had a cellulitis in the area as well as crepitance. She was brought to  the operating day for incision and drainage and probable extensive  debridement. She was explained the risks, benefits and possible  complications. DETAILS OF THE PROCEDURE:  The patient was brought to the operative  suite and placed in a supine position on the operative table. After  general anesthetic, she was placed in the frog-leg position and prepped  and draped in the usual sterile fashion. There was a pinhole opening in the left perineum. We placed the  hemostat into the opening. A foul odor was noted. Anaerobic and  aerobic cultures were taken. We opened the skin and subcutaneous tissue  over the hemostat along the line of the groin crease.   The initial  subcutaneous tissue was yellow and viable appearing. We extended the  incision further along the groin crease and then entered pockets of gray  necrotic subcutaneous tissue. The fascia, where we initially opened the  wound was also necrotic. We excised all necrotic pockets of  subcutaneous tissue. We excised back to healthy bleeding yellow  subcutaneous tissue. The fascia of the medial and anterior left thigh  was excised. The underlying muscle was viable and alive. Hemostasis was achieved with cautery and 3-0 Vicryl figure-of-eight  sutures. Once we had adequate hemostasis, we carefully inspected the entire  wound. There was no further tracking or suspected areas of spreading  infection. All of the tissue, which remained was viable and healthy  appearing. The wound was measured at 23 cm in longest length. The  longest length was parallel to the crease of the groin. The majority of  the debridement was below the groin crease. The wound measured 15 cm in  width and 3 cm in depth. The wound was packed with a combination of saline and Betadine. ABDs  were then placed as well as mesh pants. The patient remained stable  throughout the procedure room and will be transferred back to intensive  care unit in critical but stable condition. Harleen Posey.  Gene Melchor MD    D: 08/07/2020 18:05:15       T: 08/07/2020 18:15:57     [email or web address]  Job#: [id number]     Doc#: [id number]    CC:

## 2020-08-08 NOTE — PROGRESS NOTES
Pulmonary & Critical Care Medicine ICU Progress Note      ASSESSMENT AND PLAN:     Severe Sepsis due to left groin and left thigh cellulitis   Treating with antibiotics    COVID-19 testing is pending   Clinically improved  Diabetic Ketoacidosis without coma   Resolved  Diabetes Mellitus with hyperglycemia  · Lantus  · Sliding Scale Insulin   Left Groin and Left Thigh Cellulitis   Treating with Cefepime, Vancomycin, and Clindamycin   She had debridement of the wound 8/7/20  Hyponatremia   Fluid management per Nephrology  Acute Kidney Injury   Management per Nephrology  Lactic Acidosis due to sepsis and diabetic ketoacidosis   Resolved   Bilateral Lower Lobe Atelectasis   Noted on Abdomen CT Scan 8/7/20   No treatment needed unless she has hypoxemia - currently on room air        PROPHYLAXIS:   DVT Prophylaxis with Lovenox  GI Prophylaxis with Famotidine    NUTRITION:   Carb Control Diet    DISPOSITION:   ICU Patient        REASON FOR VISIT:  sepsis      UPDATE:   The DKA resolved and the insulin infusion was stopped. She was taken to surgery last evening to debride the infection site. CHIEF COMPLAINT:  weakness    HISTORY:  She has generalized weakness. REVIEW OF SYMPTOMS:  ENT:  No sore throat or hoarseness. Cardiovascular:  No chest pain or palpitations.            IV:   sodium chloride Stopped (08/08/20 0102)    dextrose         Intake/Output Summary (Last 24 hours) at 8/8/2020 1445  Last data filed at 8/8/2020 1426  Gross per 24 hour   Intake 6841 ml   Output 3200 ml   Net 3641 ml       MEDICATIONS:  Scheduled Meds:   aspirin  81 mg Oral Daily    metoclopramide  5 mg Oral 4x Daily    sertraline  50 mg Oral Daily    insulin glargine  30 Units Subcutaneous Daily    insulin lispro  10 Units Subcutaneous TID     melatonin  3 mg Oral Nightly    sodium chloride flush  10 mL Intravenous 2 times per day    sodium chloride  1 g Oral TID WC    furosemide  20 mg Oral BID    sodium chloride flush  10 mL Intravenous 2 times per day    famotidine  20 mg Oral Daily    cefepime  2 g Intravenous Q12H    clindamycin (CLEOCIN) IV  600 mg Intravenous Q8H    enoxaparin  40 mg Subcutaneous Daily    vancomycin  1,000 mg Intravenous Q24H    metoprolol tartrate  50 mg Oral BID    insulin lispro  0-12 Units Subcutaneous TID WC    insulin lispro  0-6 Units Subcutaneous Nightly     PRN Meds:  sodium chloride flush, sodium chloride flush, acetaminophen **OR** acetaminophen, polyethylene glycol, promethazine **OR** ondansetron, dextrose, potassium chloride, magnesium sulfate, sodium phosphate IVPB **OR** sodium phosphate IVPB **OR** sodium phosphate IVPB, [COMPLETED] sodium chloride **FOLLOWED BY** sodium chloride, glucose, dextrose, glucagon (rDNA), dextrose      PHYSICAL EXAM:  Vital Signs: BP (!) 99/49   Pulse 80   Temp 98.8 °F (37.1 °C) (Core)   Resp 20   Ht 5' 9\" (1.753 m)   Wt 254 lb 12.8 oz (115.6 kg)   SpO2 100%   BMI 37.63 kg/m²      Gen:   No distress. Breathing comfortably at rest.  Neck:   Trachea is midline. No JVD. Resp:   No accessory muscle use. Psych:  Awake and alert. LAB RESULTS:  CBC:   Recent Labs     08/07/20  0745 08/08/20  0102   WBC 18.8*  --    HGB 9.7* 8.4*   HCT 30.9* 25.4*   MCV 94.4  --      --      CHEMISTRY:   Recent Labs     08/07/20  2055 08/08/20  0102 08/08/20  0924   * 130* 127*   K 3.6 3.9 4.7   CL 97* 100 96*   CO2 19* 20* 16*   BUN 49* 48* 45*   CREATININE 1.7* 1.7* 1.7*   PHOS 2.4* 3.0 2.7   GLUCOSE 327* 193* 175*     LIVER PROFILE:   Recent Labs     08/07/20  0745   AST 44*   ALT 38   LIPASE 25.0   BILITOT 0.9   ALKPHOS 82     ABG: No results for input(s): PHART, JES5NQG, PO2ART in the last 72 hours. Results for Ashely Robbins (MRN [id number]) as of 8/7/2020 16:20   Ref.  Range 8/7/2020 07:45   Beta-Hydroxybutyrate Latest Ref Range: 0.00 - 0.27 mmol/L 0.30 (H)       LACTIC ACID:  Results for Ashely Robbins (MRN [id number]) as of 8/7/2020 16:20   Ref. Range 8/7/2020 07:45 8/7/2020 11:00   Lactic Acid Latest Ref Range: 0.4 - 2.0 mmol/L 3.1 (H) 1.6         CULTURES:  Blood Cultures 8/7/20:  Pending  SARS-CoV-2 on 8/7/20:  Pending       CHEST XRAY:  Results for orders placed during the hospital encounter of 08/07/20   XR CHEST PORTABLE    Narrative EXAMINATION:  ONE XRAY VIEW OF THE CHEST    8/7/2020 8:08 am    COMPARISON:  None. HISTORY:  ORDERING SYSTEM PROVIDED HISTORY: low grade fever  TECHNOLOGIST PROVIDED HISTORY:  Reason for exam:->low grade fever  Reason for Exam: Fall [facility], Pt had fall yester day, lost ballance  while putting food in refigerator.)  Acuity: Acute  Type of Exam: Initial    FINDINGS:  There are atelectatic changes seen at the left lung base with a small  left-sided effusion. The cardiac silhouette is enlarged. There is  obscuration of the left hemidiaphragm which may be secondary to the  left-sided effusion and atelectatic changes within the left lung base however  cannot exclude the possibility of a left lower lobe pneumonia. There is no  pneumothorax. Impression Small left-sided pleural effusion with overlying consolidation may be  secondary to atelectasis versus left lower lobe pneumonia. LEFT FEMUR CT SCAN 8/7/20:  Soft tissue emphysema in the proximal medial thigh extending into the   anterior proximal thigh and laterally around the hip to the level of the   anterosuperior iliac spine.  Findings could relate to the patient's clinical   ulcer or gas-forming soft tissue infection. No intramuscular gas in the thigh.  No abscess or fluid collection. ABDOMEN AND PELVIS CT SCAN 8/7/20:  1. Gas-forming cellulitis of the upper left thigh and groin   2. Chronic left hydronephrosis with renal atrophy, distal ureteral stricture   suspected   3. Left nephrolithiasis        HEAD CT SCAN 8/7/20:  No acute intracranial abnormality.

## 2020-08-08 NOTE — PROGRESS NOTES
Resumed care of patient at 2300. Shift assessment completed. See complex assessment in doc flowsheet. Patient remains on DKA protocol. Every four labs collected at 0100. Will place electrolytes as needed. Pt is alert x4, able to follow commands, and assist in care. NSR on the monitor. Lungs diminished and pt on room air. Abd is soft with +bs x4 quadrants. Hernia noted. Surgical dressing intact with old drainage noted. Pedal pulses palpable and patient able to wiggle toes. Llanos is patent and draining yellow urine. Pt denies pain. Pt repositioned for comfort. Bed in lowest position, wheels locked, and alarm active. Updated pt on POC and all questions answered. No needs expressed. Call light within reach.

## 2020-08-08 NOTE — PROGRESS NOTES
BID  HYDROmorphone (DILAUDID) 1 MG/ML injection,   sodium chloride flush 0.9 % injection 10 mL, 2 times per day  sodium chloride flush 0.9 % injection 10 mL, PRN  acetaminophen (TYLENOL) tablet 650 mg, Q6H PRN    Or  acetaminophen (TYLENOL) suppository 650 mg, Q6H PRN  polyethylene glycol (GLYCOLAX) packet 17 g, Daily PRN  promethazine (PHENERGAN) tablet 12.5 mg, Q6H PRN    Or  ondansetron (ZOFRAN) injection 4 mg, Q6H PRN  famotidine (PEPCID) tablet 20 mg, Daily  dextrose 50 % IV solution, PRN  potassium chloride 10 mEq/100 mL IVPB (Peripheral Line), PRN  magnesium sulfate 1 g in dextrose 5% 100 mL IVPB, PRN  sodium phosphate 10 mmol in dextrose 5 % 250 mL IVPB, PRN    Or  sodium phosphate 15 mmol in dextrose 5 % 250 mL IVPB, PRN    Or  sodium phosphate 20 mmol in dextrose 5 % 500 mL IVPB, PRN  0.45 % sodium chloride infusion, Continuous PRN  cefepime (MAXIPIME) 2 g IVPB minibag, Q12H  clindamycin (CLEOCIN) 600 mg in dextrose 5 % 50 mL IVPB, Q8H  enoxaparin (LOVENOX) injection 40 mg, Daily  vancomycin 1000 mg IVPB in 250 mL D5W addavial, Q24H  metoprolol tartrate (LOPRESSOR) tablet 50 mg, BID  glucose (GLUTOSE) 40 % oral gel 15 g, PRN  dextrose 50 % IV solution, PRN  glucagon (rDNA) injection 1 mg, PRN  dextrose 5 % solution, PRN  insulin lispro (1 Unit Dial) 0-12 Units, TID WC  insulin lispro (1 Unit Dial) 0-6 Units, Nightly        Objective:  BP (!) 99/49   Pulse 80   Temp 98.8 °F (37.1 °C) (Core)   Resp 20   Ht 5' 9\" (1.753 m)   Wt 254 lb 12.8 oz (115.6 kg)   SpO2 100%   BMI 37.63 kg/m²     Intake/Output Summary (Last 24 hours) at 8/8/2020 1424  Last data filed at 8/8/2020 0630  Gross per 24 hour   Intake 6841 ml   Output 2350 ml   Net 4491 ml      Wt Readings from Last 3 Encounters:   08/08/20 254 lb 12.8 oz (115.6 kg)       General appearance:  Appears comfortable  Eyes: Sclera clear. Pupils equal.  ENT: Moist oral mucosa. Trachea midline, no adenopathy. Cardiovascular: Regular rhythm, normal S1, S2.  No murmur. No edema in lower extremities  Respiratory: Not using accessory muscles. Good inspiratory effort. Clear to auscultation bilaterally, no wheeze or crackles. GI: Abdomen soft, no tenderness, not distended, normal bowel sounds  Musculoskeletal: No cyanosis in digits, neck supple  Neurology: CN 2-12 grossly intact. No speech or motor deficits  Psych: Normal affect. Alert and oriented in time, place and person  Skin: Warm, dry, normal turgor  Extremity exam shows brisk capillary refill. Peripheral pulses are palpable in lower extremities     Labs and Tests:  CBC:   Recent Labs     08/07/20  0745 08/08/20  0102   WBC 18.8*  --    HGB 9.7* 8.4*     --      BMP:    Recent Labs     08/07/20  2055 08/08/20  0102 08/08/20  0924   * 130* 127*   K 3.6 3.9 4.7   CL 97* 100 96*   CO2 19* 20* 16*   BUN 49* 48* 45*   CREATININE 1.7* 1.7* 1.7*   GLUCOSE 327* 193* 175*     Hepatic:   Recent Labs     08/07/20  0745   AST 44*   ALT 38   BILITOT 0.9   ALKPHOS 82     CT FEMUR LEFT WO CONTRAST   Preliminary Result   Soft tissue emphysema in the proximal medial thigh extending into the   anterior proximal thigh and laterally around the hip to the level of the   anterosuperior iliac spine. Findings could relate to the patient's clinical   ulcer or gas-forming soft tissue infection. No intramuscular gas in the thigh. No abscess or fluid collection. Findings were discussed with Renée LATHAM at 10:26 am on 8/7/2020. CT ABDOMEN PELVIS WO CONTRAST Additional Contrast? None   Final Result   1. Gas-forming cellulitis of the upper left thigh and groin   2. Chronic left hydronephrosis with renal atrophy, distal ureteral stricture   suspected   3. Left nephrolithiasis         CT CERVICAL SPINE WO CONTRAST   Final Result   No acute abnormality of the cervical spine. Moderate multilevel degenerative   disc disease worse between C5 and C7.          CT HEAD WO CONTRAST   Final Result   No acute

## 2020-08-08 NOTE — PROGRESS NOTES
[facility] Hospital Note    Patient Active Problem List   Diagnosis    DKA, type 2, not at goal [facility])    Diabetic ketoacidosis without coma associated with type 2 diabetes mellitus ([facility]    Severe sepsis ([facility]    Lactic acidosis    Atelectasis    Necrotizing soft tissue infection       Past Medical History:   has a past medical history of Diabetes mellitus ([facility] Past Social History:   reports that she has never smoked. She has never used smokeless tobacco. She reports that she does not drink alcohol or use drugs. Subjective:  No complaints today. Episodes of low bp    Review of Systems   Constitutional: Positive for fatigue. Negative for activity change, appetite change, chills, fever and unexpected weight change. HENT: Negative for congestion and facial swelling. Eyes: Negative for photophobia, discharge and redness. Respiratory: Negative for cough, chest tightness and shortness of breath. Cardiovascular: Positive for leg swelling. Negative for chest pain and palpitations. Gastrointestinal: Negative for abdominal distention, abdominal pain, blood in stool, constipation, diarrhea, nausea and vomiting. Endocrine: Negative for cold intolerance, heat intolerance and polyuria. Genitourinary: Negative for decreased urine volume, difficulty urinating, flank pain and hematuria. Musculoskeletal: Negative for joint swelling and neck pain. Neurological: Negative for dizziness, seizures, syncope, speech difficulty, light-headedness and headaches. Hematological: Does not bruise/bleed easily. Psychiatric/Behavioral: Negative for agitation, confusion and hallucinations.        Objective:      BP (!) 100/50   Pulse 79   Temp 98.9 °F (37.2 °C) (Core)   Resp 20   Ht 5' 9\" (1.753 m)   Wt 254 lb 12.8 oz (115.6 kg)   SpO2 99%   BMI 37.63 kg/m²     Wt Readings from Last 3 Encounters:   08/08/20 254 lb 12.8 oz (115.6 kg)       BP Readings from Last 3 Encounters:   08/08/20 (!) 100/50   08/07/20 (!) 95/58       Chest- clear  Heart-regular  Abd-soft  Ext- 1+ edema    Labs  Hemoglobin   Date Value Ref Range Status   08/08/2020 8.4 (L) 12.0 - 16.0 g/dL Final     Hematocrit   Date Value Ref Range Status   08/08/2020 25.4 (L) 36.0 - 48.0 % Final     WBC   Date Value Ref Range Status   08/07/2020 18.8 (H) 4.0 - 11.0 K/uL Final     Platelets   Date Value Ref Range Status   08/07/2020 231 135 - 450 K/uL Final     Lab Results   Component Value Date    CREATININE 1.7 (H) 08/08/2020    BUN 45 (H) 08/08/2020     (L) 08/08/2020    K 4.7 08/08/2020    CL 96 (L) 08/08/2020    CO2 16 (L) 08/08/2020     Uosm 376  Semaj less than 20    Assessment/Plan:  1. DVEON on CKD III, baseline creatinine 1.4. Etiology of DEVON probably  due to prerenal azotemia in the setting of elevated blood glucose,  relative hypotension, ARB and Lasix use. Better. Non-oliguirc. +4.8L since admission. No need for maintenance IVF at this time. Can bolus PRN. 2.  Hyponatremia,   Unclear duration of hyponatremia. Due to increased ADH from decreased intravascular volume. She does have medications that can increase ADH release including Sertraline. Will start NaCl tabs. Will use low dose Lasix po to decrease medullary concentrating gradient. 3.  Respiratory alkalosis plus anion gap metabolic acidosis. Control  blood glucose. Support blood pressure. 4.  Rhabdomyolysis. Hold fibrate and statin for now. Recheck CK. 5.  Hypoalbuminemia. 6.  Diabetes mellitus with severely elevated blood glucose. Management  as per Medicine. 7.  The patient is high risk with significant/severe DEVON and multiple  electrolyte abnormalities. Would need close followup. Decrease BMP to every 8 hours.      Allyson Chaidez MD

## 2020-08-09 LAB
ANION GAP SERPL CALCULATED.3IONS-SCNC: 10 MMOL/L (ref 3–16)
ANION GAP SERPL CALCULATED.3IONS-SCNC: 10 MMOL/L (ref 3–16)
BANDED NEUTROPHILS RELATIVE PERCENT: 12 % (ref 0–7)
BASOPHILS ABSOLUTE: 0.1 K/UL (ref 0–0.2)
BASOPHILS RELATIVE PERCENT: 1 %
BUN BLDV-MCNC: 40 MG/DL (ref 7–20)
BUN BLDV-MCNC: 41 MG/DL (ref 7–20)
CALCIUM IONIZED: 1.04 MMOL/L (ref 1.12–1.32)
CALCIUM SERPL-MCNC: 7.8 MG/DL (ref 8.3–10.6)
CALCIUM SERPL-MCNC: 8 MG/DL (ref 8.3–10.6)
CHLORIDE BLD-SCNC: 100 MMOL/L (ref 99–110)
CHLORIDE BLD-SCNC: 99 MMOL/L (ref 99–110)
CO2: 21 MMOL/L (ref 21–32)
CO2: 21 MMOL/L (ref 21–32)
CREAT SERPL-MCNC: 1.3 MG/DL (ref 0.6–1.2)
CREAT SERPL-MCNC: 1.4 MG/DL (ref 0.6–1.2)
EOSINOPHILS ABSOLUTE: 0.5 K/UL (ref 0–0.6)
EOSINOPHILS RELATIVE PERCENT: 4 %
GFR AFRICAN AMERICAN: 45
GFR AFRICAN AMERICAN: 49
GFR NON-AFRICAN AMERICAN: 37
GFR NON-AFRICAN AMERICAN: 40
GLUCOSE BLD-MCNC: 137 MG/DL (ref 70–99)
GLUCOSE BLD-MCNC: 159 MG/DL (ref 70–99)
GLUCOSE BLD-MCNC: 188 MG/DL (ref 70–99)
GLUCOSE BLD-MCNC: 189 MG/DL (ref 70–99)
GLUCOSE BLD-MCNC: 203 MG/DL (ref 70–99)
GLUCOSE BLD-MCNC: 256 MG/DL (ref 70–99)
GRAM STAIN RESULT: ABNORMAL
HCT VFR BLD CALC: 25.9 % (ref 36–48)
HEMOGLOBIN: 8.4 G/DL (ref 12–16)
LYMPHOCYTES ABSOLUTE: 2 K/UL (ref 1–5.1)
LYMPHOCYTES RELATIVE PERCENT: 18 %
MAGNESIUM: 2.3 MG/DL (ref 1.8–2.4)
MCH RBC QN AUTO: 30.6 PG (ref 26–34)
MCHC RBC AUTO-ENTMCNC: 32.6 G/DL (ref 31–36)
MCV RBC AUTO: 94 FL (ref 80–100)
MONOCYTES ABSOLUTE: 0.5 K/UL (ref 0–1.3)
MONOCYTES RELATIVE PERCENT: 4 %
NEUTROPHILS ABSOLUTE: 8.2 K/UL (ref 1.7–7.7)
NEUTROPHILS RELATIVE PERCENT: 61 %
ORGANISM: ABNORMAL
PDW BLD-RTO: 14.3 % (ref 12.4–15.4)
PERFORMED ON: ABNORMAL
PH VENOUS: 7.4 (ref 7.35–7.45)
PHOSPHORUS: 3 MG/DL (ref 2.5–4.9)
PLATELET # BLD: 246 K/UL (ref 135–450)
PLATELET SLIDE REVIEW: ADEQUATE
PMV BLD AUTO: 7.3 FL (ref 5–10.5)
POTASSIUM SERPL-SCNC: 4.2 MMOL/L (ref 3.5–5.1)
POTASSIUM SERPL-SCNC: 4.3 MMOL/L (ref 3.5–5.1)
RBC # BLD: 2.76 M/UL (ref 4–5.2)
RBC # BLD: NORMAL 10*6/UL
SARS-COV-2, NAA: NOT DETECTED
SLIDE REVIEW: ABNORMAL
SODIUM BLD-SCNC: 130 MMOL/L (ref 136–145)
SODIUM BLD-SCNC: 131 MMOL/L (ref 136–145)
TOTAL CK: 57 U/L (ref 26–192)
TOXIC GRANULATION: PRESENT
WBC # BLD: 11.3 K/UL (ref 4–11)
WOUND/ABSCESS: ABNORMAL
WOUND/ABSCESS: ABNORMAL

## 2020-08-09 PROCEDURE — 82330 ASSAY OF CALCIUM: CPT

## 2020-08-09 PROCEDURE — 80048 BASIC METABOLIC PNL TOTAL CA: CPT

## 2020-08-09 PROCEDURE — 36415 COLL VENOUS BLD VENIPUNCTURE: CPT

## 2020-08-09 PROCEDURE — 85025 COMPLETE CBC W/AUTO DIFF WBC: CPT

## 2020-08-09 PROCEDURE — 6370000000 HC RX 637 (ALT 250 FOR IP): Performed by: SURGERY

## 2020-08-09 PROCEDURE — 6370000000 HC RX 637 (ALT 250 FOR IP): Performed by: STUDENT IN AN ORGANIZED HEALTH CARE EDUCATION/TRAINING PROGRAM

## 2020-08-09 PROCEDURE — 6370000000 HC RX 637 (ALT 250 FOR IP): Performed by: INTERNAL MEDICINE

## 2020-08-09 PROCEDURE — 6360000002 HC RX W HCPCS: Performed by: SURGERY

## 2020-08-09 PROCEDURE — 1200000000 HC SEMI PRIVATE

## 2020-08-09 PROCEDURE — 99024 POSTOP FOLLOW-UP VISIT: CPT | Performed by: SURGERY

## 2020-08-09 PROCEDURE — 2580000003 HC RX 258: Performed by: SURGERY

## 2020-08-09 PROCEDURE — 6360000002 HC RX W HCPCS: Performed by: INTERNAL MEDICINE

## 2020-08-09 PROCEDURE — 84100 ASSAY OF PHOSPHORUS: CPT

## 2020-08-09 PROCEDURE — 99232 SBSQ HOSP IP/OBS MODERATE 35: CPT | Performed by: INTERNAL MEDICINE

## 2020-08-09 PROCEDURE — 83735 ASSAY OF MAGNESIUM: CPT

## 2020-08-09 PROCEDURE — 2580000003 HC RX 258: Performed by: INTERNAL MEDICINE

## 2020-08-09 PROCEDURE — 2500000003 HC RX 250 WO HCPCS: Performed by: SURGERY

## 2020-08-09 PROCEDURE — 82550 ASSAY OF CK (CPK): CPT

## 2020-08-09 RX ORDER — HYDROCODONE BITARTRATE AND ACETAMINOPHEN 5; 325 MG/1; MG/1
1 TABLET ORAL EVERY 4 HOURS PRN
Status: DISCONTINUED | OUTPATIENT
Start: 2020-08-09 | End: 2020-08-12 | Stop reason: HOSPADM

## 2020-08-09 RX ORDER — HYDROMORPHONE HYDROCHLORIDE 1 MG/ML
1 INJECTION, SOLUTION INTRAMUSCULAR; INTRAVENOUS; SUBCUTANEOUS EVERY 4 HOURS PRN
Status: DISCONTINUED | OUTPATIENT
Start: 2020-08-09 | End: 2020-08-12 | Stop reason: HOSPADM

## 2020-08-09 RX ORDER — MORPHINE SULFATE 2 MG/ML
2 INJECTION, SOLUTION INTRAMUSCULAR; INTRAVENOUS ONCE
Status: COMPLETED | OUTPATIENT
Start: 2020-08-09 | End: 2020-08-09

## 2020-08-09 RX ORDER — METRONIDAZOLE 250 MG/1
500 TABLET ORAL EVERY 8 HOURS SCHEDULED
Status: DISCONTINUED | OUTPATIENT
Start: 2020-08-09 | End: 2020-08-12

## 2020-08-09 RX ADMIN — ONDANSETRON 4 MG: 2 INJECTION INTRAMUSCULAR; INTRAVENOUS at 13:24

## 2020-08-09 RX ADMIN — FUROSEMIDE 20 MG: 20 TABLET ORAL at 17:13

## 2020-08-09 RX ADMIN — FAMOTIDINE 20 MG: 20 TABLET, FILM COATED ORAL at 08:07

## 2020-08-09 RX ADMIN — SERTRALINE HYDROCHLORIDE 50 MG: 50 TABLET, FILM COATED ORAL at 08:07

## 2020-08-09 RX ADMIN — SODIUM CHLORIDE TAB 1 GM 1 G: 1 TAB at 17:13

## 2020-08-09 RX ADMIN — METRONIDAZOLE 500 MG: 250 TABLET, FILM COATED ORAL at 20:36

## 2020-08-09 RX ADMIN — INSULIN LISPRO 2 UNITS: 100 INJECTION, SOLUTION INTRAVENOUS; SUBCUTANEOUS at 08:12

## 2020-08-09 RX ADMIN — METOPROLOL TARTRATE 50 MG: 50 TABLET, FILM COATED ORAL at 21:08

## 2020-08-09 RX ADMIN — HYDROCODONE BITARTRATE AND ACETAMINOPHEN 1 TABLET: 5; 325 TABLET ORAL at 17:13

## 2020-08-09 RX ADMIN — FUROSEMIDE 20 MG: 20 TABLET ORAL at 08:07

## 2020-08-09 RX ADMIN — Medication 10 ML: at 20:39

## 2020-08-09 RX ADMIN — METOCLOPRAMIDE 5 MG: 10 TABLET ORAL at 20:37

## 2020-08-09 RX ADMIN — MELATONIN TAB 3 MG 3 MG: 3 TAB at 20:36

## 2020-08-09 RX ADMIN — CEFTRIAXONE 2 G: 2 INJECTION, POWDER, FOR SOLUTION INTRAMUSCULAR; INTRAVENOUS at 11:28

## 2020-08-09 RX ADMIN — METOCLOPRAMIDE 5 MG: 10 TABLET ORAL at 17:13

## 2020-08-09 RX ADMIN — CLINDAMYCIN IN 5 PERCENT DEXTROSE 600 MG: 12 INJECTION, SOLUTION INTRAVENOUS at 04:35

## 2020-08-09 RX ADMIN — INSULIN LISPRO 6 UNITS: 100 INJECTION, SOLUTION INTRAVENOUS; SUBCUTANEOUS at 12:48

## 2020-08-09 RX ADMIN — INSULIN LISPRO 2 UNITS: 100 INJECTION, SOLUTION INTRAVENOUS; SUBCUTANEOUS at 17:14

## 2020-08-09 RX ADMIN — HYDROMORPHONE HYDROCHLORIDE 1 MG: 1 INJECTION, SOLUTION INTRAMUSCULAR; INTRAVENOUS; SUBCUTANEOUS at 13:22

## 2020-08-09 RX ADMIN — SODIUM CHLORIDE TAB 1 GM 1 G: 1 TAB at 08:07

## 2020-08-09 RX ADMIN — Medication 10 ML: at 08:09

## 2020-08-09 RX ADMIN — SODIUM CHLORIDE TAB 1 GM 1 G: 1 TAB at 11:27

## 2020-08-09 RX ADMIN — METOCLOPRAMIDE 5 MG: 10 TABLET ORAL at 11:27

## 2020-08-09 RX ADMIN — METOCLOPRAMIDE 5 MG: 10 TABLET ORAL at 08:07

## 2020-08-09 RX ADMIN — Medication 10 ML: at 08:08

## 2020-08-09 RX ADMIN — INSULIN LISPRO 10 UNITS: 100 INJECTION, SOLUTION INTRAVENOUS; SUBCUTANEOUS at 08:12

## 2020-08-09 RX ADMIN — MORPHINE SULFATE 2 MG: 2 INJECTION, SOLUTION INTRAMUSCULAR; INTRAVENOUS at 06:16

## 2020-08-09 RX ADMIN — METRONIDAZOLE 500 MG: 250 TABLET, FILM COATED ORAL at 13:23

## 2020-08-09 RX ADMIN — INSULIN LISPRO 10 UNITS: 100 INJECTION, SOLUTION INTRAVENOUS; SUBCUTANEOUS at 12:48

## 2020-08-09 RX ADMIN — INSULIN LISPRO 10 UNITS: 100 INJECTION, SOLUTION INTRAVENOUS; SUBCUTANEOUS at 17:14

## 2020-08-09 RX ADMIN — INSULIN GLARGINE 30 UNITS: 100 INJECTION, SOLUTION SUBCUTANEOUS at 08:12

## 2020-08-09 RX ADMIN — CEFEPIME HYDROCHLORIDE 2 G: 2 INJECTION, POWDER, FOR SOLUTION INTRAVENOUS at 08:08

## 2020-08-09 RX ADMIN — ENOXAPARIN SODIUM 40 MG: 40 INJECTION SUBCUTANEOUS at 08:08

## 2020-08-09 RX ADMIN — ASPIRIN 81 MG: 81 TABLET, CHEWABLE ORAL at 08:07

## 2020-08-09 RX ADMIN — CALCIUM GLUCONATE 1 G: 98 INJECTION, SOLUTION INTRAVENOUS at 12:47

## 2020-08-09 ASSESSMENT — PAIN SCALES - WONG BAKER
WONGBAKER_NUMERICALRESPONSE: 0

## 2020-08-09 ASSESSMENT — ENCOUNTER SYMPTOMS
FACIAL SWELLING: 0
EYE DISCHARGE: 0
NAUSEA: 0
COUGH: 0
ABDOMINAL DISTENTION: 0
BLOOD IN STOOL: 0
CONSTIPATION: 0
SHORTNESS OF BREATH: 0
DIARRHEA: 0
CHEST TIGHTNESS: 0
ABDOMINAL PAIN: 0
PHOTOPHOBIA: 0
VOMITING: 0
EYE REDNESS: 0

## 2020-08-09 ASSESSMENT — PAIN SCALES - GENERAL
PAINLEVEL_OUTOF10: 10
PAINLEVEL_OUTOF10: 0
PAINLEVEL_OUTOF10: 9
PAINLEVEL_OUTOF10: 5
PAINLEVEL_OUTOF10: 0
PAINLEVEL_OUTOF10: 4

## 2020-08-09 ASSESSMENT — PAIN DESCRIPTION - PAIN TYPE
TYPE: ACUTE PAIN
TYPE: ACUTE PAIN;SURGICAL PAIN
TYPE: ACUTE PAIN;SURGICAL PAIN

## 2020-08-09 ASSESSMENT — PAIN DESCRIPTION - FREQUENCY: FREQUENCY: INTERMITTENT

## 2020-08-09 ASSESSMENT — PAIN DESCRIPTION - LOCATION
LOCATION: GROIN

## 2020-08-09 ASSESSMENT — PAIN DESCRIPTION - ORIENTATION
ORIENTATION: LEFT

## 2020-08-09 ASSESSMENT — PAIN DESCRIPTION - DESCRIPTORS: DESCRIPTORS: ACHING

## 2020-08-09 NOTE — PLAN OF CARE
Problem: Falls - Risk of:  Goal: Will remain free from falls  Description: Will remain free from falls  Outcome: Ongoing  Goal: Absence of physical injury  Description: Absence of physical injury  Outcome: Ongoing     Problem: Skin Integrity:  Goal: Will show no infection signs and symptoms  Description: Will show no infection signs and symptoms  Outcome: Ongoing  Goal: Absence of new skin breakdown  Description: Absence of new skin breakdown  Outcome: Ongoing     Problem: Discharge Planning:  Goal: Discharged to appropriate level of care  Description: Discharged to appropriate level of care  Outcome: Ongoing     Problem: Fluid Volume - Imbalance:  Goal: Will remain free of signs and symptoms of dehydration  Description: Will remain free of signs and symptoms of dehydration  Outcome: Ongoing  Goal: Absence of imbalanced fluid volume signs and symptoms  Description: Absence of imbalanced fluid volume signs and symptoms  Outcome: Ongoing     Problem: Serum Glucose Level - Abnormal:  Goal: Ability to maintain appropriate glucose levels will improve  Description: Ability to maintain appropriate glucose levels will improve  Outcome: Ongoing     Problem: Injury - Acid Base Imbalance:  Goal: Acid-base balance  Description: Acid-base balance  Outcome: Ongoing

## 2020-08-09 NOTE — FLOWSHEET NOTE
08/09/20 0600   Provider Notification   Reason for Communication Evaluate   Provider Name Luna Evans   Provider Notification Physician   Method of Communication Secure Message   Response Waiting for response   [phone] Hospital or Facility: [facility] ICU ROUTINE From: Liban Clark RE: Alessia Russell RM: [id number] pt is in severe pain after wound dressing change done,can we get something stronger to kill the pain. Thanks Need Callback: NO CALLBACK REQ  read

## 2020-08-09 NOTE — PROGRESS NOTES
reassessment completed,No significant change observed, patient denies needs at this time, call light in reach, will continue to monitor.

## 2020-08-09 NOTE — PROGRESS NOTES
Postop day #2 status post debridement of a necrotizing left groin and perineal infection. She is doing much better clinically. There is some purulent fluid able to be expressed from the perineal portion of the wound but the surrounding skin and subcutaneous tissue appears normal.  The wound is very clean and there is no evidence of spread of the necrotizing infection. Continue antibiotics and current wound care. We will continue to monitor.

## 2020-08-09 NOTE — PROGRESS NOTES
Encounters:   08/09/20 (!) 115/57   08/07/20 (!) 95/58       Chest- clear  Heart-regular  Abd-soft  Ext- 1+ edema    Labs  Hemoglobin   Date Value Ref Range Status   08/09/2020 8.4 (L) 12.0 - 16.0 g/dL Final     Hematocrit   Date Value Ref Range Status   08/09/2020 25.9 (L) 36.0 - 48.0 % Final     WBC   Date Value Ref Range Status   08/09/2020 11.3 (H) 4.0 - 11.0 K/uL Final     Platelets   Date Value Ref Range Status   08/09/2020 246 135 - 450 K/uL Final     Lab Results   Component Value Date    CREATININE 1.3 (H) 08/09/2020    BUN 40 (H) 08/09/2020     (L) 08/09/2020    K 4.3 08/09/2020     08/09/2020    CO2 21 08/09/2020     Uosm 376  Semaj less than 20    Assessment/Plan:  1. DEVON on CKD III, baseline creatinine 1.4. Etiology of DEVON probably  due to prerenal azotemia in the setting of elevated blood glucose,  relative hypotension, ARB and Lasix use. Better. Non-oliguric. No need for maintenance IVF at this time. Can bolus PRN. 2.  Hyponatremia,   Unclear duration of hyponatremia. Due to increased ADH from decreased intravascular volume. She does have medications that can increase ADH release including Sertraline. Continue NaCl tabs and low dose Lasix po to decrease medullary concentrating gradient. 3.  Respiratory alkalosis plus anion gap metabolic acidosis. Control  blood glucose. Support blood pressure. 4.  Rhabdomyolysis. Hold fibrate and statin for now. Better. 5.  Hypoalbuminemia. 6.  Diabetes mellitus with severely elevated blood glucose. Management  as per Medicine. 7.  Left Groin Cellulitis- on Abx. 8.  Chronic Left Hydronephrosis with renal atrophy  9.   Decrease chem labs to daily    Hao Walker MD

## 2020-08-09 NOTE — PROGRESS NOTES
Shift assessment completed, patient is alert and oriented, states she is not hungry but will try ice cream this morning, medications administered per MAR, dressing to left groin was changed at 0600, is clean, dry and intact at this time, patient denies any pain. Fall precautions in place, hourly rounding, call light and belongings in reach, bed in lowest position, wheels locked in place, side rails up x 2, walkways free of clutter, bed alarm on. Patient agreeable to sitting up in chair later today.

## 2020-08-09 NOTE — PROGRESS NOTES
[facility]    8/9/2020 2:09 PM        Name: Flores Dias . Admitted: 8/7/2020  Primary Care Provider: Alessandra Crockett MD (Tel: [phone])    Brief Course:    Flores Dias is a 78 y.o. female who presented with fever, from what [address] home, hyperglycemia, hyponatremia, pneumonia, shortness of breath, left groin cellulitis, multiple falls patient stated that she has not been eating for the last 3 days, poor oral intake, at nursing home she had a fever , and she was febrile in the ER, her lab was significant for WBC 18.8, hemoglobin is stable at 9.7, sodium is 119, potassium 4.5, creatinine 2.3, glucose 853, anion gap acidosis 19, lactic acidosis with lactic acid 3.1, proBNP 1468, , she was started on insulin drip, she had 2 L of fluid, started on antibiotic, had a CAT scan abdomen which revealed gas-forming abscess/cellulitis in the left groin surgery was consulted from ER. Patient was admitted to ICU for management of DKA, sepsis, rule out COVID.    8/8: Had surgery yesterday, on broad-spectrum antibiotic, will follow culture, off IV fluid, nephrology following her pain is controlled today. 8/9: Grow E. coli from the wound. Creatinine down to 1.4. Weakness, shortness of breath    Subjective:  . Alert oriented  Pain is more controlled now  Had surgery and debridement August 7.     Reviewed interval ancillary notes    Current Medications  calcium gluconate 1 g in dextrose 5 % 100 mL IVPB, Once  HYDROmorphone HCl PF (DILAUDID) injection 1 mg, Q4H PRN  HYDROcodone-acetaminophen (NORCO) 5-325 MG per tablet 1 tablet, Q4H PRN  cefTRIAXone (ROCEPHIN) 2 g IVPB in D5W 50ml minibag, Q24H  metroNIDAZOLE (FLAGYL) tablet 500 mg, 3 times per day  aspirin chewable tablet 81 mg, Daily  metoclopramide (REGLAN) tablet 5 mg, 4x Daily  sertraline (ZOLOFT) tablet 50 mg, Daily  insulin glargine (LANTUS;BASAGLAR) injection pen 30 Units, Daily  insulin lispro (1 Unit Dial) 10 Units, TID WC  melatonin tablet 3 mg, Nightly  sodium chloride flush 0.9 % injection 10 mL, 2 times per day  sodium chloride flush 0.9 % injection 10 mL, PRN  sodium chloride tablet 1 g, TID WC  furosemide (LASIX) tablet 20 mg, BID  sodium chloride flush 0.9 % injection 10 mL, 2 times per day  sodium chloride flush 0.9 % injection 10 mL, PRN  acetaminophen (TYLENOL) tablet 650 mg, Q6H PRN    Or  acetaminophen (TYLENOL) suppository 650 mg, Q6H PRN  polyethylene glycol (GLYCOLAX) packet 17 g, Daily PRN  promethazine (PHENERGAN) tablet 12.5 mg, Q6H PRN    Or  ondansetron (ZOFRAN) injection 4 mg, Q6H PRN  famotidine (PEPCID) tablet 20 mg, Daily  dextrose 50 % IV solution, PRN  potassium chloride 10 mEq/100 mL IVPB (Peripheral Line), PRN  magnesium sulfate 1 g in dextrose 5% 100 mL IVPB, PRN  sodium phosphate 10 mmol in dextrose 5 % 250 mL IVPB, PRN    Or  sodium phosphate 15 mmol in dextrose 5 % 250 mL IVPB, PRN    Or  sodium phosphate 20 mmol in dextrose 5 % 500 mL IVPB, PRN  0.45 % sodium chloride infusion, Continuous PRN  enoxaparin (LOVENOX) injection 40 mg, Daily  metoprolol tartrate (LOPRESSOR) tablet 50 mg, BID  glucose (GLUTOSE) 40 % oral gel 15 g, PRN  dextrose 50 % IV solution, PRN  glucagon (rDNA) injection 1 mg, PRN  dextrose 5 % solution, PRN  insulin lispro (1 Unit Dial) 0-12 Units, TID WC  insulin lispro (1 Unit Dial) 0-6 Units, Nightly        Objective:  BP (!) 115/57   Pulse 75   Temp 98.6 °F (37 °C) (Core)   Resp 18   Ht 5' 9\" (1.753 m)   Wt 256 lb (116.1 kg)   SpO2 92%   BMI 37.80 kg/m²     Intake/Output Summary (Last 24 hours) at 8/9/2020 1409  Last data filed at 8/9/2020 1126  Gross per 24 hour   Intake 120 ml   Output 2650 ml   Net -2530 ml      Wt Readings from Last 3 Encounters:   08/09/20 256 lb (116.1 kg)       General appearance:  Appears comfortable  Eyes: Sclera clear.  Pupils equal.  ENT: Moist oral mucosa. Trachea midline, no adenopathy. Cardiovascular: Regular rhythm, normal S1, S2. No murmur. No edema in lower extremities  Respiratory: Not using accessory muscles. Good inspiratory effort. Clear to auscultation bilaterally, no wheeze or crackles. GI: Abdomen soft, no tenderness, not distended, normal bowel sounds  Musculoskeletal: No cyanosis in digits, neck supple  Neurology: CN 2-12 grossly intact. No speech or motor deficits  Psych: Normal affect. Alert and oriented in time, place and person  Skin: Warm, dry, normal turgor  Extremity exam shows brisk capillary refill. Peripheral pulses are palpable in lower extremities     Labs and Tests:  CBC:   Recent Labs     08/07/20  0745 08/08/20  0102 08/09/20  0507   WBC 18.8*  --  11.3*   HGB 9.7* 8.4* 8.4*     --  246     BMP:    Recent Labs     08/08/20  0924 08/08/20  2356 08/09/20  0843   * 130* 131*   K 4.7 4.2 4.3   CL 96* 99 100   CO2 16* 21 21   BUN 45* 41* 40*   CREATININE 1.7* 1.4* 1.3*   GLUCOSE 175* 159* 203*     Hepatic:   Recent Labs     08/07/20  0745   AST 44*   ALT 38   BILITOT 0.9   ALKPHOS 82     CT FEMUR LEFT WO CONTRAST   Final Result   Soft tissue emphysema in the proximal medial thigh extending into the   anterior proximal thigh and laterally around the hip to the level of the   anterosuperior iliac spine. Findings could relate to the patient's clinical   ulcer or gas-forming soft tissue infection. No intramuscular gas in the thigh. No abscess or fluid collection. Findings were discussed with Rocco LATHAM at 10:26 am on 8/7/2020. CT ABDOMEN PELVIS WO CONTRAST Additional Contrast? None   Final Result   1. Gas-forming cellulitis of the upper left thigh and groin   2. Chronic left hydronephrosis with renal atrophy, distal ureteral stricture   suspected   3. Left nephrolithiasis         CT CERVICAL SPINE WO CONTRAST   Final Result   No acute abnormality of the cervical spine.   Moderate multilevel degenerative   disc disease worse between C5 and C7. CT HEAD WO CONTRAST   Final Result   No acute intracranial abnormality. XR CHEST PORTABLE   Final Result   Small left-sided pleural effusion with overlying consolidation may be   secondary to atelectasis versus left lower lobe pneumonia. Problem List  Active Problems:    DKA, type 2, not at goal [facility])    Severe sepsis (HCC)    Lactic acidosis    Atelectasis    Necrotizing soft tissue infection  Resolved Problems:    * No resolved hospital problems.  *       Assessment & Plan:   Severe sepsis secondary to left groin cellulitis/abscess  Critical care consult  Off insulin drip, started Lantus and lispro  Broad-spectrum antibiotic  Stop clindamycin  Stop cefepime and Vanco  Start Rocephin and Flagyl  She agreeable E. coli from wound culture  COVID negative  Patient is not on pressors     Hyperglycemia  Stop insulin drip  Continue Lantus and lispro pre-meal  Possible secondary to cellulitis in the left groin  Surgery consult, had debridement  Follow culture and de-escalate antibiotic     Hyponatremia, pseudohyponatremia, DEVON on CKD, AGMA  Nephrology consult  Hold IV fluid  Salt tablet    Rhabdomyolysis  Hold statin and fibrillate  We will monitor CK    Diabetes, uncontrolled  Off insulin drip  Eating     Coronary artery disease  Beta-blocker as blood pressure permits  We will hold ACE and arm  Continue aspirin     DVT prophylaxis Lovenox  Code status full code  Diabetic diet  IV access peripheral  [facility] Catheter ICU    Diet: DIET CARB CONTROL;  Code:Full Code    Disposition: ICU, stable to transfer out of ICU      Brice Adams MD   8/9/2020 2:09 PM

## 2020-08-09 NOTE — PROGRESS NOTES
chloride flush  10 mL Intravenous 2 times per day    famotidine  20 mg Oral Daily    enoxaparin  40 mg Subcutaneous Daily    metoprolol tartrate  50 mg Oral BID    insulin lispro  0-12 Units Subcutaneous TID     insulin lispro  0-6 Units Subcutaneous Nightly     PRN Meds:  HYDROmorphone, HYDROcodone 5 mg - acetaminophen, sodium chloride flush, sodium chloride flush, acetaminophen **OR** acetaminophen, polyethylene glycol, promethazine **OR** ondansetron, dextrose, potassium chloride, magnesium sulfate, sodium phosphate IVPB **OR** sodium phosphate IVPB **OR** sodium phosphate IVPB, [COMPLETED] sodium chloride **FOLLOWED BY** sodium chloride, glucose, dextrose, glucagon (rDNA), dextrose      PHYSICAL EXAM:  Vital Signs: BP (!) 115/57   Pulse 75   Temp 98.6 °F (37 °C) (Core)   Resp 18   Ht 5' 9\" (1.753 m)   Wt 256 lb (116.1 kg)   SpO2 92%   BMI 37.80 kg/m²      Gen:   No distress. Breathing comfortably at rest.  Neck:   Trachea is midline. No JVD. Resp:   No accessory muscle use. No wheezing. Psych:  Awake and alert. LAB RESULTS:  CBC:   Recent Labs     08/07/20  0745 08/08/20  0102 08/09/20  0507   WBC 18.8*  --  11.3*   HGB 9.7* 8.4* 8.4*   HCT 30.9* 25.4* 25.9*   MCV 94.4  --  94.0     --  246     CHEMISTRY:   Recent Labs     08/08/20  0102 08/08/20  0924 08/08/20  2356 08/09/20  0507 08/09/20  0843   * 127* 130*  --  131*   K 3.9 4.7 4.2  --  4.3    96* 99  --  100   CO2 20* 16* 21  --  21   BUN 48* 45* 41*  --  40*   CREATININE 1.7* 1.7* 1.4*  --  1.3*   PHOS 3.0 2.7  --  3.0  --    GLUCOSE 193* 175* 159*  --  203*     LIVER PROFILE:   Recent Labs     08/07/20  0745   AST 44*   ALT 38   LIPASE 25.0   BILITOT 0.9   ALKPHOS 82     ABG: No results for input(s): PHART, RLT5UDN, PO2ART in the last 72 hours. LACTIC ACID:  Results for Magali Morrow (MRN [id number]) as of 8/7/2020 16:20   Ref.  Range 8/7/2020 07:45 8/7/2020 11:00   Lactic Acid Latest Ref Range: 0.4 - 2.0 mmol/L 3.1 (H) 1.6         CULTURES:  Blood Cultures 8/7/20:  No growth so far   Wound Culture 8/7/20: E coli  SARS-CoV-2 on 8/7/20:  Not detected      CHEST XRAY:  Results for orders placed during the hospital encounter of 08/07/20   XR CHEST PORTABLE    Narrative EXAMINATION:  ONE XRAY VIEW OF THE CHEST    8/7/2020 8:08 am    COMPARISON:  None. HISTORY:  ORDERING SYSTEM PROVIDED HISTORY: low grade fever  TECHNOLOGIST PROVIDED HISTORY:  Reason for exam:->low grade fever  Reason for Exam: Fall [facility], Pt had fall yester day, lost ballance  while putting food in refigerator.)  Acuity: Acute  Type of Exam: Initial    FINDINGS:  There are atelectatic changes seen at the left lung base with a small  left-sided effusion. The cardiac silhouette is enlarged. There is  obscuration of the left hemidiaphragm which may be secondary to the  left-sided effusion and atelectatic changes within the left lung base however  cannot exclude the possibility of a left lower lobe pneumonia. There is no  pneumothorax. Impression Small left-sided pleural effusion with overlying consolidation may be  secondary to atelectasis versus left lower lobe pneumonia. LEFT FEMUR CT SCAN 8/7/20:  Soft tissue emphysema in the proximal medial thigh extending into the   anterior proximal thigh and laterally around the hip to the level of the   anterosuperior iliac spine.  Findings could relate to the patient's clinical   ulcer or gas-forming soft tissue infection. No intramuscular gas in the thigh.  No abscess or fluid collection. ABDOMEN AND PELVIS CT SCAN 8/7/20:  1. Gas-forming cellulitis of the upper left thigh and groin   2. Chronic left hydronephrosis with renal atrophy, distal ureteral stricture   suspected   3. Left nephrolithiasis        HEAD CT SCAN 8/7/20:  No acute intracranial abnormality.

## 2020-08-10 LAB
ANION GAP SERPL CALCULATED.3IONS-SCNC: 11 MMOL/L (ref 3–16)
BANDED NEUTROPHILS RELATIVE PERCENT: 5 % (ref 0–7)
BASOPHILIC STIPPLING: ABNORMAL
BASOPHILS ABSOLUTE: 0.2 K/UL (ref 0–0.2)
BASOPHILS RELATIVE PERCENT: 2 %
BUN BLDV-MCNC: 37 MG/DL (ref 7–20)
CALCIUM IONIZED: 1.14 MMOL/L (ref 1.12–1.32)
CALCIUM SERPL-MCNC: 8.6 MG/DL (ref 8.3–10.6)
CHLORIDE BLD-SCNC: 101 MMOL/L (ref 99–110)
CO2: 22 MMOL/L (ref 21–32)
CREAT SERPL-MCNC: 1.2 MG/DL (ref 0.6–1.2)
EOSINOPHILS ABSOLUTE: 0.6 K/UL (ref 0–0.6)
EOSINOPHILS RELATIVE PERCENT: 6 %
GFR AFRICAN AMERICAN: 54
GFR NON-AFRICAN AMERICAN: 44
GLUCOSE BLD-MCNC: 158 MG/DL (ref 70–99)
GLUCOSE BLD-MCNC: 160 MG/DL (ref 70–99)
GLUCOSE BLD-MCNC: 183 MG/DL (ref 70–99)
GLUCOSE BLD-MCNC: 189 MG/DL (ref 70–99)
GLUCOSE BLD-MCNC: 203 MG/DL (ref 70–99)
HCT VFR BLD CALC: 26 % (ref 36–48)
HEMOGLOBIN: 8.7 G/DL (ref 12–16)
HYPOCHROMIA: ABNORMAL
LYMPHOCYTES ABSOLUTE: 1.9 K/UL (ref 1–5.1)
LYMPHOCYTES RELATIVE PERCENT: 20 %
MAGNESIUM: 2.1 MG/DL (ref 1.8–2.4)
MCH RBC QN AUTO: 30.5 PG (ref 26–34)
MCHC RBC AUTO-ENTMCNC: 33.2 G/DL (ref 31–36)
MCV RBC AUTO: 91.9 FL (ref 80–100)
METAMYELOCYTES RELATIVE PERCENT: 2 %
MONOCYTES ABSOLUTE: 0.4 K/UL (ref 0–1.3)
MONOCYTES RELATIVE PERCENT: 4 %
MYELOCYTE PERCENT: 2 %
NEUTROPHILS ABSOLUTE: 6.5 K/UL (ref 1.7–7.7)
NEUTROPHILS RELATIVE PERCENT: 59 %
PDW BLD-RTO: 14.2 % (ref 12.4–15.4)
PERFORMED ON: ABNORMAL
PH VENOUS: 7.42 (ref 7.35–7.45)
PHOSPHORUS: 3.1 MG/DL (ref 2.5–4.9)
PLATELET # BLD: 331 K/UL (ref 135–450)
PLATELET SLIDE REVIEW: ADEQUATE
PMV BLD AUTO: 6.8 FL (ref 5–10.5)
POTASSIUM SERPL-SCNC: 4.2 MMOL/L (ref 3.5–5.1)
RBC # BLD: 2.83 M/UL (ref 4–5.2)
SLIDE REVIEW: ABNORMAL
SODIUM BLD-SCNC: 134 MMOL/L (ref 136–145)
TARGET CELLS: ABNORMAL
TOXIC GRANULATION: PRESENT
WBC # BLD: 9.6 K/UL (ref 4–11)

## 2020-08-10 PROCEDURE — 84100 ASSAY OF PHOSPHORUS: CPT

## 2020-08-10 PROCEDURE — 85025 COMPLETE CBC W/AUTO DIFF WBC: CPT

## 2020-08-10 PROCEDURE — 80048 BASIC METABOLIC PNL TOTAL CA: CPT

## 2020-08-10 PROCEDURE — 6370000000 HC RX 637 (ALT 250 FOR IP): Performed by: INTERNAL MEDICINE

## 2020-08-10 PROCEDURE — 87070 CULTURE OTHR SPECIMN AEROBIC: CPT

## 2020-08-10 PROCEDURE — 99024 POSTOP FOLLOW-UP VISIT: CPT | Performed by: SURGERY

## 2020-08-10 PROCEDURE — 83735 ASSAY OF MAGNESIUM: CPT

## 2020-08-10 PROCEDURE — 87186 SC STD MICRODIL/AGAR DIL: CPT

## 2020-08-10 PROCEDURE — 6370000000 HC RX 637 (ALT 250 FOR IP): Performed by: STUDENT IN AN ORGANIZED HEALTH CARE EDUCATION/TRAINING PROGRAM

## 2020-08-10 PROCEDURE — 6370000000 HC RX 637 (ALT 250 FOR IP): Performed by: FAMILY MEDICINE

## 2020-08-10 PROCEDURE — APPNB30 APP NON BILLABLE TIME 0-30 MINS: Performed by: NURSE PRACTITIONER

## 2020-08-10 PROCEDURE — 6360000002 HC RX W HCPCS: Performed by: SURGERY

## 2020-08-10 PROCEDURE — APPSS15 APP SPLIT SHARED TIME 0-15 MINUTES: Performed by: NURSE PRACTITIONER

## 2020-08-10 PROCEDURE — 1200000000 HC SEMI PRIVATE

## 2020-08-10 PROCEDURE — 94760 N-INVAS EAR/PLS OXIMETRY 1: CPT

## 2020-08-10 PROCEDURE — 82330 ASSAY OF CALCIUM: CPT

## 2020-08-10 PROCEDURE — 87205 SMEAR GRAM STAIN: CPT

## 2020-08-10 PROCEDURE — 36415 COLL VENOUS BLD VENIPUNCTURE: CPT

## 2020-08-10 PROCEDURE — 6370000000 HC RX 637 (ALT 250 FOR IP): Performed by: SURGERY

## 2020-08-10 PROCEDURE — 87077 CULTURE AEROBIC IDENTIFY: CPT

## 2020-08-10 RX ORDER — LEVOFLOXACIN 500 MG/1
500 TABLET, FILM COATED ORAL DAILY
Status: DISCONTINUED | OUTPATIENT
Start: 2020-08-10 | End: 2020-08-12 | Stop reason: HOSPADM

## 2020-08-10 RX ORDER — ONDANSETRON 4 MG/1
4 TABLET, ORALLY DISINTEGRATING ORAL EVERY 8 HOURS PRN
Status: DISCONTINUED | OUTPATIENT
Start: 2020-08-10 | End: 2020-08-12 | Stop reason: HOSPADM

## 2020-08-10 RX ADMIN — METOCLOPRAMIDE 5 MG: 10 TABLET ORAL at 21:21

## 2020-08-10 RX ADMIN — INSULIN LISPRO 2 UNITS: 100 INJECTION, SOLUTION INTRAVENOUS; SUBCUTANEOUS at 16:04

## 2020-08-10 RX ADMIN — PROMETHAZINE HYDROCHLORIDE 12.5 MG: 25 TABLET ORAL at 21:51

## 2020-08-10 RX ADMIN — FAMOTIDINE 20 MG: 20 TABLET, FILM COATED ORAL at 08:05

## 2020-08-10 RX ADMIN — METOCLOPRAMIDE 5 MG: 10 TABLET ORAL at 13:32

## 2020-08-10 RX ADMIN — INSULIN LISPRO 4 UNITS: 100 INJECTION, SOLUTION INTRAVENOUS; SUBCUTANEOUS at 13:32

## 2020-08-10 RX ADMIN — INSULIN LISPRO 2 UNITS: 100 INJECTION, SOLUTION INTRAVENOUS; SUBCUTANEOUS at 08:07

## 2020-08-10 RX ADMIN — SODIUM CHLORIDE TAB 1 GM 1 G: 1 TAB at 13:31

## 2020-08-10 RX ADMIN — HYDROCODONE BITARTRATE AND ACETAMINOPHEN 1 TABLET: 5; 325 TABLET ORAL at 08:15

## 2020-08-10 RX ADMIN — ONDANSETRON 4 MG: 4 TABLET, ORALLY DISINTEGRATING ORAL at 10:54

## 2020-08-10 RX ADMIN — METOPROLOL TARTRATE 50 MG: 50 TABLET, FILM COATED ORAL at 21:21

## 2020-08-10 RX ADMIN — INSULIN LISPRO 10 UNITS: 100 INJECTION, SOLUTION INTRAVENOUS; SUBCUTANEOUS at 13:33

## 2020-08-10 RX ADMIN — METOCLOPRAMIDE 5 MG: 10 TABLET ORAL at 08:05

## 2020-08-10 RX ADMIN — HYDROCODONE BITARTRATE AND ACETAMINOPHEN 1 TABLET: 5; 325 TABLET ORAL at 21:21

## 2020-08-10 RX ADMIN — HYDROCODONE BITARTRATE AND ACETAMINOPHEN 1 TABLET: 5; 325 TABLET ORAL at 04:25

## 2020-08-10 RX ADMIN — METOCLOPRAMIDE 5 MG: 10 TABLET ORAL at 18:25

## 2020-08-10 RX ADMIN — INSULIN GLARGINE 30 UNITS: 100 INJECTION, SOLUTION SUBCUTANEOUS at 08:06

## 2020-08-10 RX ADMIN — METRONIDAZOLE 500 MG: 250 TABLET, FILM COATED ORAL at 06:18

## 2020-08-10 RX ADMIN — INSULIN LISPRO 10 UNITS: 100 INJECTION, SOLUTION INTRAVENOUS; SUBCUTANEOUS at 16:05

## 2020-08-10 RX ADMIN — LEVOFLOXACIN 500 MG: 500 TABLET, FILM COATED ORAL at 10:54

## 2020-08-10 RX ADMIN — ENOXAPARIN SODIUM 40 MG: 40 INJECTION SUBCUTANEOUS at 08:05

## 2020-08-10 RX ADMIN — INSULIN LISPRO 1 UNITS: 100 INJECTION, SOLUTION INTRAVENOUS; SUBCUTANEOUS at 21:21

## 2020-08-10 RX ADMIN — METRONIDAZOLE 500 MG: 250 TABLET, FILM COATED ORAL at 21:21

## 2020-08-10 RX ADMIN — METOPROLOL TARTRATE 50 MG: 50 TABLET, FILM COATED ORAL at 08:05

## 2020-08-10 RX ADMIN — INSULIN LISPRO 10 UNITS: 100 INJECTION, SOLUTION INTRAVENOUS; SUBCUTANEOUS at 08:08

## 2020-08-10 RX ADMIN — SODIUM CHLORIDE TAB 1 GM 1 G: 1 TAB at 08:04

## 2020-08-10 RX ADMIN — MELATONIN TAB 3 MG 3 MG: 3 TAB at 21:21

## 2020-08-10 RX ADMIN — SERTRALINE HYDROCHLORIDE 50 MG: 50 TABLET, FILM COATED ORAL at 08:05

## 2020-08-10 RX ADMIN — METRONIDAZOLE 500 MG: 250 TABLET, FILM COATED ORAL at 14:53

## 2020-08-10 RX ADMIN — ASPIRIN 81 MG: 81 TABLET, CHEWABLE ORAL at 08:04

## 2020-08-10 RX ADMIN — FUROSEMIDE 20 MG: 20 TABLET ORAL at 08:05

## 2020-08-10 ASSESSMENT — PAIN DESCRIPTION - FREQUENCY
FREQUENCY: INTERMITTENT

## 2020-08-10 ASSESSMENT — PAIN DESCRIPTION - PROGRESSION: CLINICAL_PROGRESSION: GRADUALLY IMPROVING

## 2020-08-10 ASSESSMENT — PAIN SCALES - GENERAL
PAINLEVEL_OUTOF10: 5
PAINLEVEL_OUTOF10: 5
PAINLEVEL_OUTOF10: 3
PAINLEVEL_OUTOF10: 5
PAINLEVEL_OUTOF10: 5

## 2020-08-10 ASSESSMENT — PAIN DESCRIPTION - ONSET
ONSET: ON-GOING
ONSET: AWAKENED FROM SLEEP
ONSET: ON-GOING

## 2020-08-10 ASSESSMENT — PAIN DESCRIPTION - PAIN TYPE
TYPE: ACUTE PAIN;SURGICAL PAIN

## 2020-08-10 ASSESSMENT — PAIN DESCRIPTION - DESCRIPTORS
DESCRIPTORS: DISCOMFORT
DESCRIPTORS: DISCOMFORT
DESCRIPTORS: ACHING;DISCOMFORT

## 2020-08-10 ASSESSMENT — PAIN SCALES - WONG BAKER
WONGBAKER_NUMERICALRESPONSE: 0

## 2020-08-10 ASSESSMENT — PAIN DESCRIPTION - LOCATION
LOCATION: GROIN

## 2020-08-10 ASSESSMENT — PAIN DESCRIPTION - ORIENTATION
ORIENTATION: LEFT

## 2020-08-10 NOTE — PROGRESS NOTES
Patient assessment completed, see doc flow sheets. Dressing changed completed with Norco given prior to dressing change. Patient tolerated well-rates pain 5/10. Patient repositioned in bed. Denies any further needs at this time. Will continue to monitor.

## 2020-08-10 NOTE — CARE COORDINATION
Discharge Planning Assessment    SW discharge planner met with patient to discuss reason for admission, current living situation, and potential needs at the time of discharge. Demographics/Insurance verified:  Yes    Current type of dwelling:  Assisted Living at [facility]    Patient from ECF/SW confirmed with:  Pt and Ankur at facility. Living arrangements:  Assisted Living    Level of function/Support:  Normally walks w/walker at all times. Tentative discharge plan:  PT/OT pending and just ordered today. Possible HHC or SNF. If SNF, pt agreeable to rehab at [facility]. Informed Ankur of possible referral.    Also, MAYITO consult noted for assistance with transfer to a different senior care. SW informed pt this was not something we can do from the hospital.  Olayinka Viveros called Ankur and informed of this request and asked her to reach out to pt's family to address this issue. Pt reporting she is receiving good care at [facility], but that her dtrs live near [facility]. Transportation at the time of discharge:    Pt will need assistance w/transport.     Electronically signed by EAN Cazares, PARVIN on 8/10/2020 at 2:55 PM

## 2020-08-10 NOTE — PROGRESS NOTES
[facility]        Progress Note    Patient Name: Ally Akbar  MRN: [id number]  YOB: 1950  Date of Evaluation: 8/10/2020    Subjective:  No acute events overnight  Pain controlled  Nursing reports drainage and required dressing changed this morning  Eating well, slept okay last night  Resting in bed at this time, has not been up since OR      Post-Operative Day #3    Vital Signs:  Patient Vitals for the past 24 hrs:   BP Temp Temp src Pulse Resp SpO2 Weight   08/10/20 1148 -- -- -- -- -- 96 % --   08/10/20 1015 114/71 98.1 °F (36.7 °C) Oral 62 16 96 % --   08/10/20 0800 (!) 126/55 98.4 °F (36.9 °C) CORE 77 14 97 % --   08/10/20 0700 -- -- -- -- -- -- 250 lb (113.4 kg)   08/10/20 0425 (!) 120/53 98.6 °F (37 °C) CORE 70 18 98 % --   08/10/20 0400 -- -- -- 72 -- -- --   08/10/20 0000 (!) 119/58 98.2 °F (36.8 °C) CORE 67 20 97 % --   20 2108 (!) 138/55 -- -- 80 -- -- --   20 2000 125/61 98 °F (36.7 °C) CORE 74 14 98 % --   20 1700 -- 97.4 °F (36.3 °C) Temporal 78 18 -- --      TEMPERATURE HISTORY 24H: Temp (24hrs), Av.1 °F (36.7 °C), Min:97.4 °F (36.3 °C), Max:98.6 °F (37 °C)    BLOOD PRESSURE HISTORY: Systolic (88STC), VSM:027 , Min:96 , HOX:164    Diastolic (77XPU), DNJ:12, Min:47, Max:71      Intake/Output:  I/O last 3 completed shifts: In: 250 [P.O.:240; I.V.:10]  Out:  [Urine:]  No intake/output data recorded.   Drain/tube Output:       Physical Exam:  General: awake, alert, oriented to  person, place, time  Lungs: unlabored respirations  Skin/Wound: wound bed open and generally clean with some purulent drainage medially    Labs:  CBC:    Recent Labs     20  0102 20  0507 08/10/20  0532   WBC  --  11.3* 9.6   HGB 8.4* 8.4* 8.7*   HCT 25.4* 25.9* 26.0*   PLT  --  246 331     BMP:    Recent Labs     20  2356 20  0843 08/10/20  0532   * 131* 134*   K 4.2 4.3 4.2   CL 99 100 101   CO2 21 21 22   BUN 41* 40* 37*   CREATININE 1.4* 1.3* 1.2   GLUCOSE 159* 203* 158*     Hepatic:  No results for input(s): AST, ALT, ALB, BILITOT, ALKPHOS in the last 72 hours. Amylase:  No results found for: AMYLASE  Lipase:    Lab Results   Component Value Date    LIPASE 25.0 08/07/2020      Mag:    Lab Results   Component Value Date    MG 2.10 08/10/2020    MG 2.30 08/09/2020     Phos:     Lab Results   Component Value Date    PHOS 3.1 08/10/2020    PHOS 3.0 08/09/2020      Coags:   Lab Results   Component Value Date    PROTIME 15.0 08/07/2020    INR 1.29 08/07/2020       Cultures:  Anaerobic culture  Lab Results   Component Value Date    LABANAE  08/07/2020     Anaerobic culture further report to follow  No anaerobes isolated so far, Further report to follow       Fungus stain  Results in Past 30 Days  Result Component Current Result Ref Range Previous Result Ref Range   Fungus Stain No Fungal elements seen (8/7/2020)  Not in Time Range      Gram stain  Results in Past 30 Days  Result Component Current Result Ref Range Previous Result Ref Range   Gram Stain Result CANCELED (8/7/2020)  1+ WBC's (Polymorphonuclear)  4+ Gram negative rods  3+ Gram positive cocci   (A) (8/7/2020)      Organism  Lab Results   Component Value Date/Time    ORG Escherichia coli (A) 08/07/2020 05:49 PM     Surgical culture  Lab Results   Component Value Date/Time    CXSURG (A) 08/07/2020 05:49 PM     Mixed skin patricio,  Light growth  No further workup      CXSURG Light growth 08/07/2020 05:49 PM     Blood culture 1  Results in Past 30 Days  Result Component Current Result Ref Range Previous Result Ref Range   Blood Culture, Routine No Growth to date. Any change in status will be called. (8/7/2020)  Not in Time Range      Blood culture 2  Results in Past 30 Days  Result Component Current Result Ref Range Previous Result Ref Range   Culture, Blood 2 No Growth to date.   Any change in status will be called. (8/7/2020)  Not in Time Range      Fecal occult  No 8/7/2020)      Plan:  1. Local wound care with wet-to-dry dressings daily and PRN, cultures sent  2. Carb control diet as tolerated  3. Antibiotics  4. Activity as tolerated, ambulate TID, up to chair for all meals--PT/OT treatment and evaluation  5. PRN analgesics and antiemetics--minimizing narcotics as tolerated, transition to PO  6. DVT prophylaxis with Lovenox and SCD's  7. Management of medical comorbid etiologies per primary team and consulting services    EDUCATION:  Educated patient on plan of care and disease process--all questions answered. Plans discussed with patient and nursing. Reviewed and discussed with Dr. Jossue Nguyen. Signed:  Young Dukes, APRN - CNP  8/10/2020 12:46 PM     Wound clean  Still some purulent fluid towards perineum.  Culture taken  No evidence of progression of necrotizing soft tissue infection  Continue wound care and IV antibiotics

## 2020-08-10 NOTE — PROGRESS NOTES
Shift assessment completed. See complex assessment in doc flowsheet. Pt is alert x4, able to follow commands, and assist in care. NSR on the monitor. Lungs diminished and pt on room air. Abd is soft with bs hypoactive x4 quadrants. Patient denies passing gas at this time. Patient declines Glycolax. Llanos is patent and draining kiya urine. x3 IV infiltrated or occulted. IVs removed with patient tolerating well. Pt denies pain. Pt repositioned for comfort. Bed in lowest position, wheels locked, and alarm active. Updated pt on POC and all questions answered. No needs expressed. Call light within reach.

## 2020-08-10 NOTE — PROGRESS NOTES
Patient assessment completed, no acute changes. Patient remains alert x4 and on room air with VSS. Dressing reinforced with abd pads and tape. Patient repositioned in bed. No further needs at this time. Will continue to monitor.

## 2020-08-10 NOTE — PROGRESS NOTES
MD Dhaval Haas MD Lizabeth Citizen, MD                  Office: [phone]                 Fax: [phone]         [facility] Note    PATIENT NAME: Alfredo Martinez  : 1950  MRN: [id number]      Patient Active Problem List   Diagnosis    DKA, type 2, not at goal [facility])    Diabetic ketoacidosis without coma associated with type 2 diabetes mellitus ([facility] [address] [facility]    Severe sepsis ([facility] [address] [facility]    Lactic acidosis    Atelectasis    Necrotizing soft tissue infection    Cellulitis of left groin       Past Medical History:   has a past medical history of Diabetes mellitus ([facility] [address] Past Social History:   reports that she has never smoked. She has never used smokeless tobacco. She reports that she does not drink alcohol or use drugs. Subjective:  No complaints today. Moving out of ICU today     Review of Systems   Constitutional: Positive for fatigue. Negative for activity change, appetite change, chills, fever and unexpected weight change. HENT: Negative for congestion and facial swelling. Eyes: Negative for photophobia, discharge and redness. Respiratory: Negative for cough, chest tightness and shortness of breath. Cardiovascular: Positive for leg swelling. Negative for chest pain and palpitations. Gastrointestinal: Negative for abdominal distention, abdominal pain, blood in stool, constipation, diarrhea, nausea and vomiting. Endocrine: Negative for cold intolerance, heat intolerance and polyuria. Genitourinary: Negative for decreased urine volume, difficulty urinating, flank pain and hematuria. Musculoskeletal: Negative for joint swelling and neck pain. Neurological: Negative for dizziness, seizures, syncope, speech difficulty, light-headedness and headaches. Hematological: Does not bruise/bleed easily. Psychiatric/Behavioral: Negative for agitation, confusion and hallucinations. Objective:      BP (!) 126/55   Pulse 77   Temp 98.4 °F (36.9 °C) (Core)   Resp 14   Ht 5' 9\" (1.753 m)   Wt 250 lb (113.4 kg)   SpO2 97%   BMI 36.92 kg/m²     Wt Readings from Last 3 Encounters:   08/10/20 250 lb (113.4 kg)       BP Readings from Last 3 Encounters:   08/10/20 (!) 126/55   08/07/20 (!) 95/58       Chest- clear  Heart-regular  Abd-soft  Ext- 1+ edema    Labs  Hemoglobin   Date Value Ref Range Status   08/10/2020 8.7 (L) 12.0 - 16.0 g/dL Final     Hematocrit   Date Value Ref Range Status   08/10/2020 26.0 (L) 36.0 - 48.0 % Final     WBC   Date Value Ref Range Status   08/10/2020 9.6 4.0 - 11.0 K/uL Final     Platelets   Date Value Ref Range Status   08/10/2020 331 135 - 450 K/uL Final     Lab Results   Component Value Date    CREATININE 1.2 08/10/2020    BUN 37 (H) 08/10/2020     (L) 08/10/2020    K 4.2 08/10/2020     08/10/2020    CO2 22 08/10/2020     Uosm 376  Semaj less than 20    Assessment/Plan:  1. DEVON on CKD III, baseline creatinine 1.4.    - Etiology of DEVON probably due to prerenal azotemia in the setting of elevated blood glucose,  relative hypotension, ARB and Lasix use. - remains Better. Non-oliguric.   - No need for maintenance IVF at this time. Can bolus PRN. - continue to monitoring     2. Hyponatremia,   Unclear duration of hyponatremia. Improving.   - Due to increased ADH from decreased intravascular volume. - She does have medications that can increase ADH release including Sertraline.    - Will continue NaCl tabs    - s/p low dose Lasix po   - to decrease medullary concentrating gradient. - as improving. 3.  Respiratory alkalosis plus anion gap metabolic acidosis. Control blood glucose. Support blood pressure. Monitor,   4. Rhabdomyolysis. Hold fibrate and statin for now. Better. 5.  Hypoalbuminemia. 6.  Diabetes mellitus with severely elevated blood glucose. Management as per Medicine. 7.  Left Groin Cellulitis- on Abx.   8. Chronic Left Hydronephrosis with renal atrophy  9. Decreased chem labs to daily    High complexity. Multiple medical problems. Discussed with patient and treatment team.  ICU nurse    Thank you for allowing me to participate in this patient's care. Please do not hesitate to contact me for any questions/concerns. We will follow along with you.      Izabel Macias MD  [facility] [address] [place] [address]   Phone: [phone] or Via [facility]  Fax: [phone]

## 2020-08-10 NOTE — PLAN OF CARE
Problem: Falls - Risk of:  Goal: Will remain free from falls  Description: Will remain free from falls  8/9/2020 2242 by Silvia Case RN  Outcome: Ongoing  8/9/2020 1515 by Mae Trujillo RN  Outcome: Ongoing   Patient placed on fall Precautions per Anthony Sal Fall Risk Assessment Scale (Score> 45). Fall risk armband on, yellow blanket placed on foot of bed, S.A.F.E. sign or orange light displayed at door. Bed in locked and low position, bed alarm armed and audible, call light and bedside table in reach. Patient acknowledges the need to call before getting out of bed. Q2 monitoring, and toileting performed. Problem: Skin Integrity:  Goal: Will show no infection signs and symptoms  Description: Will show no infection signs and symptoms  8/9/2020 2242 by Silvia Case RN  Outcome: Ongoing  8/9/2020 1515 by Mae Trujillo RN  Outcome: Ongoing     Problem: Serum Glucose Level - Abnormal:  Goal: Ability to maintain appropriate glucose levels will improve  Description: Ability to maintain appropriate glucose levels will improve  8/9/2020 2242 by Silvia Case RN  Outcome: Ongoing  8/9/2020 1515 by Mae Trujillo RN  Outcome: Ongoing   - No coverage needed at this time. Problem: Pain:  Goal: Control of acute pain  Description: Control of acute pain  Outcome: Ongoing   Patient denies any pain at this time.

## 2020-08-10 NOTE — PROGRESS NOTES
Shift assessment complete, see flow sheets. VSS, pt A/Ox4, room air, NSR. Meds given, see MAR, tolerated well. Breakfast provided. Pt c/o pain 5/10 in Left groin surgical site, PRN pain med given, see MAR. Pt repositioning self. Llanos intact, draining yellow urine. Left groin surgical dressing clean/dry/intact. No IV access, failed attempt at PICC line yesterday, BUE swelling, unable to find veins. PoC discussed, questions answered. Bed alarm on, call light in reach, no further needs at this time, will continue to monitor.

## 2020-08-10 NOTE — PROGRESS NOTES
0945: report called to [address] [place] [address]    9183: Pt left floor with transport to , on  tele pack.

## 2020-08-10 NOTE — PROGRESS NOTES
[facility]    8/10/2020 5:23 PM        Name: Kane Greenfield . Admitted: 8/7/2020  Primary Care Provider: Erin Cline MD (Tel: [phone])    Brief Course:    Kane Greenfield is a 78 y.o. female who presented with fever, from [address] [place] [address] home, hyperglycemia, hyponatremia, pneumonia, shortness of breath, left groin cellulitis, multiple falls patient stated that she has not been eating for the last 3 days, poor oral intake, at nursing home she had a fever , and she was febrile in the ER, her lab was significant for WBC 18.8, hemoglobin is stable at 9.7, sodium is 119, potassium 4.5, creatinine 2.3, glucose 853, anion gap acidosis 19, lactic acidosis with lactic acid 3.1, proBNP 1468, , she was started on insulin drip, she had 2 L of fluid, started on antibiotic, had a CAT scan abdomen which revealed gas-forming abscess/cellulitis in the left groin surgery was consulted from ER. Patient was admitted to ICU for management of DKA, sepsis, rule out COVID.    8/8: Had surgery yesterday, on broad-spectrum antibiotic, will follow culture, off IV fluid, nephrology following her pain is controlled today. 8/9: Grow E. coli from the wound. Creatinine down to 1.4. Weakness, shortness of breath    Subjective:  . Alert oriented  Pain is more controlled now  Had surgery and debridement August 7.     Reviewed interval ancillary notes    Current Medications  levoFLOXacin (LEVAQUIN) tablet 500 mg, Daily  ondansetron (ZOFRAN-ODT) disintegrating tablet 4 mg, Q8H PRN  HYDROmorphone HCl PF (DILAUDID) injection 1 mg, Q4H PRN  HYDROcodone-acetaminophen (NORCO) 5-325 MG per tablet 1 tablet, Q4H PRN  metroNIDAZOLE (FLAGYL) tablet 500 mg, 3 times per day  aspirin chewable tablet 81 mg, Daily  metoclopramide (REGLAN) tablet 5 mg, 4x Daily  sertraline (ZOLOFT) tablet 50 mg, Daily  insulin glargine (LANTUS;BASAGLAR) injection pen 30 Units, Daily  insulin lispro (1 Unit Dial) 10 Units, TID WC  melatonin tablet 3 mg, Nightly  sodium chloride flush 0.9 % injection 10 mL, 2 times per day  sodium chloride flush 0.9 % injection 10 mL, PRN  sodium chloride tablet 1 g, TID WC  acetaminophen (TYLENOL) tablet 650 mg, Q6H PRN    Or  acetaminophen (TYLENOL) suppository 650 mg, Q6H PRN  polyethylene glycol (GLYCOLAX) packet 17 g, Daily PRN  promethazine (PHENERGAN) tablet 12.5 mg, Q6H PRN    Or  ondansetron (ZOFRAN) injection 4 mg, Q6H PRN  famotidine (PEPCID) tablet 20 mg, Daily  dextrose 50 % IV solution, PRN  enoxaparin (LOVENOX) injection 40 mg, Daily  metoprolol tartrate (LOPRESSOR) tablet 50 mg, BID  glucose (GLUTOSE) 40 % oral gel 15 g, PRN  dextrose 50 % IV solution, PRN  glucagon (rDNA) injection 1 mg, PRN  dextrose 5 % solution, PRN  insulin lispro (1 Unit Dial) 0-12 Units, TID WC  insulin lispro (1 Unit Dial) 0-6 Units, Nightly        Objective:  BP (!) 144/75   Pulse 93   Temp 98.4 °F (36.9 °C) (Oral)   Resp 16   Ht 5' 9\" (1.753 m)   Wt 250 lb (113.4 kg)   SpO2 97%   BMI 36.92 kg/m²     Intake/Output Summary (Last 24 hours) at 8/10/2020 1723  Last data filed at 8/10/2020 1455  Gross per 24 hour   Intake 10 ml   Output 2050 ml   Net -2040 ml      Wt Readings from Last 3 Encounters:   08/10/20 250 lb (113.4 kg)       General appearance:  Appears comfortable  Eyes: Sclera clear. Pupils equal.  ENT: Moist oral mucosa. Trachea midline, no adenopathy. Cardiovascular: Regular rhythm, normal S1, S2. No murmur. No edema in lower extremities  Respiratory: Not using accessory muscles. Good inspiratory effort. Clear to auscultation bilaterally, no wheeze or crackles. GI: Abdomen soft, no tenderness, not distended, normal bowel sounds  Musculoskeletal: No cyanosis in digits, neck supple  Neurology: CN 2-12 grossly intact. No speech or motor deficits  Psych: Normal affect.  Alert and oriented in time, place and person  Skin: Warm, dry, normal turgor  Extremity exam shows brisk capillary refill. Peripheral pulses are palpable in lower extremities     Labs and Tests:  CBC:   Recent Labs     08/08/20  0102 08/09/20  0507 08/10/20  0532   WBC  --  11.3* 9.6   HGB 8.4* 8.4* 8.7*   PLT  --  246 331     BMP:    Recent Labs     08/08/20  2356 08/09/20  0843 08/10/20  0532   * 131* 134*   K 4.2 4.3 4.2   CL 99 100 101   CO2 21 21 22   BUN 41* 40* 37*   CREATININE 1.4* 1.3* 1.2   GLUCOSE 159* 203* 158*     Hepatic:   No results for input(s): AST, ALT, ALB, BILITOT, ALKPHOS in the last 72 hours. CT FEMUR LEFT WO CONTRAST   Final Result   Soft tissue emphysema in the proximal medial thigh extending into the   anterior proximal thigh and laterally around the hip to the level of the   anterosuperior iliac spine. Findings could relate to the patient's clinical   ulcer or gas-forming soft tissue infection. No intramuscular gas in the thigh. No abscess or fluid collection. Findings were discussed with Dora LATHAM at 10:26 am on 8/7/2020. CT ABDOMEN PELVIS WO CONTRAST Additional Contrast? None   Final Result   1. Gas-forming cellulitis of the upper left thigh and groin   2. Chronic left hydronephrosis with renal atrophy, distal ureteral stricture   suspected   3. Left nephrolithiasis         CT CERVICAL SPINE WO CONTRAST   Final Result   No acute abnormality of the cervical spine. Moderate multilevel degenerative   disc disease worse between C5 and C7. CT HEAD WO CONTRAST   Final Result   No acute intracranial abnormality. XR CHEST PORTABLE   Final Result   Small left-sided pleural effusion with overlying consolidation may be   secondary to atelectasis versus left lower lobe pneumonia.                  Problem List  Active Problems:    DKA, type 2, not at goal [facility])    Severe sepsis (HCC)    Lactic acidosis    Atelectasis    Necrotizing soft tissue infection    Cellulitis of left groin  Resolved Problems:    * No resolved hospital problems. *       Assessment & Plan:   Severe sepsis secondary to left groin cellulitis/abscess  - much improved  - transitioned to oral levaquin as she lost IV access  - she is a little nauseated  She grew  E. coli from wound culture- sensitive to levaquin with low SHARON     COVID negative      DKA -resolved   Lantus and lispro insuli         Diabetes, uncontrolled  Off insulin drip  Eating  Hyperglycemia  Continue Lantus and lispro pre-meal  Possible secondary to cellulitis in the left groin  Surgery consult, had debridement- surgery is still following   Follow culture and de-escalate antibiotic     Hyponatremia, pseudohyponatremia, DEVON on CKD, AGMA  Nephrology consult  Hold IV fluid  Salt tablet    Rhabdomyolysis- improving.    Hold statin and fibrillate  We will monitor CK      Coronary artery disease  Beta-blocker as blood pressure permits  We will hold ACE and arm  Continue aspirin     DVT prophylaxis Lovenox  Code status full code  Diabetic diet  IV access peripheral  Llanos Catheter ICU    Diet: DIET CARB CONTROL;  Code:Full Code    Disposition: anticipate d/c in the next 24 hours, possible SNF  Pt/ot      Bryan Trevino MD   8/10/2020 5:23 PM

## 2020-08-11 LAB
ANION GAP SERPL CALCULATED.3IONS-SCNC: 11 MMOL/L (ref 3–16)
ANISOCYTOSIS: ABNORMAL
BANDED NEUTROPHILS RELATIVE PERCENT: 5 % (ref 0–7)
BASOPHILS ABSOLUTE: 0 K/UL (ref 0–0.2)
BASOPHILS RELATIVE PERCENT: 0 %
BLOOD CULTURE, ROUTINE: NORMAL
BUN BLDV-MCNC: 30 MG/DL (ref 7–20)
CALCIUM IONIZED: 1.15 MMOL/L (ref 1.12–1.32)
CALCIUM SERPL-MCNC: 8.8 MG/DL (ref 8.3–10.6)
CHLORIDE BLD-SCNC: 102 MMOL/L (ref 99–110)
CO2: 23 MMOL/L (ref 21–32)
CREAT SERPL-MCNC: 1.2 MG/DL (ref 0.6–1.2)
CULTURE, BLOOD 2: NORMAL
EOSINOPHILS ABSOLUTE: 0.4 K/UL (ref 0–0.6)
EOSINOPHILS RELATIVE PERCENT: 4 %
GFR AFRICAN AMERICAN: 54
GFR NON-AFRICAN AMERICAN: 44
GLUCOSE BLD-MCNC: 156 MG/DL (ref 70–99)
GLUCOSE BLD-MCNC: 157 MG/DL (ref 70–99)
GLUCOSE BLD-MCNC: 160 MG/DL (ref 70–99)
GLUCOSE BLD-MCNC: 175 MG/DL (ref 70–99)
GLUCOSE BLD-MCNC: 239 MG/DL (ref 70–99)
HCT VFR BLD CALC: 24.9 % (ref 36–48)
HEMOGLOBIN: 8.2 G/DL (ref 12–16)
HYPOCHROMIA: ABNORMAL
LYMPHOCYTES ABSOLUTE: 2 K/UL (ref 1–5.1)
LYMPHOCYTES RELATIVE PERCENT: 20 %
MAGNESIUM: 2 MG/DL (ref 1.8–2.4)
MCH RBC QN AUTO: 30.4 PG (ref 26–34)
MCHC RBC AUTO-ENTMCNC: 33.1 G/DL (ref 31–36)
MCV RBC AUTO: 91.8 FL (ref 80–100)
METAMYELOCYTES RELATIVE PERCENT: 1 %
MONOCYTES ABSOLUTE: 0.7 K/UL (ref 0–1.3)
MONOCYTES RELATIVE PERCENT: 7 %
NEUTROPHILS ABSOLUTE: 7 K/UL (ref 1.7–7.7)
NEUTROPHILS RELATIVE PERCENT: 63 %
PDW BLD-RTO: 14.5 % (ref 12.4–15.4)
PERFORMED ON: ABNORMAL
PH VENOUS: 7.49 (ref 7.35–7.45)
PHOSPHORUS: 3.2 MG/DL (ref 2.5–4.9)
PLATELET # BLD: 371 K/UL (ref 135–450)
PLATELET SLIDE REVIEW: ADEQUATE
PMV BLD AUTO: 6.5 FL (ref 5–10.5)
POLYCHROMASIA: ABNORMAL
POTASSIUM SERPL-SCNC: 4 MMOL/L (ref 3.5–5.1)
RBC # BLD: 2.71 M/UL (ref 4–5.2)
SLIDE REVIEW: ABNORMAL
SODIUM BLD-SCNC: 136 MMOL/L (ref 136–145)
STOMATOCYTES: ABNORMAL
TARGET CELLS: ABNORMAL
TEAR DROP CELLS: ABNORMAL
TOXIC GRANULATION: PRESENT
WBC # BLD: 10.1 K/UL (ref 4–11)

## 2020-08-11 PROCEDURE — 6370000000 HC RX 637 (ALT 250 FOR IP): Performed by: SURGERY

## 2020-08-11 PROCEDURE — 83735 ASSAY OF MAGNESIUM: CPT

## 2020-08-11 PROCEDURE — 82330 ASSAY OF CALCIUM: CPT

## 2020-08-11 PROCEDURE — 1200000000 HC SEMI PRIVATE

## 2020-08-11 PROCEDURE — 6370000000 HC RX 637 (ALT 250 FOR IP): Performed by: INTERNAL MEDICINE

## 2020-08-11 PROCEDURE — 97535 SELF CARE MNGMENT TRAINING: CPT

## 2020-08-11 PROCEDURE — 97530 THERAPEUTIC ACTIVITIES: CPT

## 2020-08-11 PROCEDURE — 97116 GAIT TRAINING THERAPY: CPT

## 2020-08-11 PROCEDURE — APPSS15 APP SPLIT SHARED TIME 0-15 MINUTES: Performed by: NURSE PRACTITIONER

## 2020-08-11 PROCEDURE — APPNB30 APP NON BILLABLE TIME 0-30 MINS: Performed by: NURSE PRACTITIONER

## 2020-08-11 PROCEDURE — 94760 N-INVAS EAR/PLS OXIMETRY 1: CPT

## 2020-08-11 PROCEDURE — 97165 OT EVAL LOW COMPLEX 30 MIN: CPT

## 2020-08-11 PROCEDURE — 97161 PT EVAL LOW COMPLEX 20 MIN: CPT

## 2020-08-11 PROCEDURE — 85025 COMPLETE CBC W/AUTO DIFF WBC: CPT

## 2020-08-11 PROCEDURE — 84100 ASSAY OF PHOSPHORUS: CPT

## 2020-08-11 PROCEDURE — 6360000002 HC RX W HCPCS: Performed by: SURGERY

## 2020-08-11 PROCEDURE — 36415 COLL VENOUS BLD VENIPUNCTURE: CPT

## 2020-08-11 PROCEDURE — 80048 BASIC METABOLIC PNL TOTAL CA: CPT

## 2020-08-11 PROCEDURE — 6370000000 HC RX 637 (ALT 250 FOR IP): Performed by: STUDENT IN AN ORGANIZED HEALTH CARE EDUCATION/TRAINING PROGRAM

## 2020-08-11 RX ADMIN — METRONIDAZOLE 500 MG: 250 TABLET, FILM COATED ORAL at 04:34

## 2020-08-11 RX ADMIN — METOCLOPRAMIDE 5 MG: 10 TABLET ORAL at 17:45

## 2020-08-11 RX ADMIN — METOCLOPRAMIDE 5 MG: 10 TABLET ORAL at 14:59

## 2020-08-11 RX ADMIN — METRONIDAZOLE 500 MG: 250 TABLET, FILM COATED ORAL at 14:59

## 2020-08-11 RX ADMIN — ENOXAPARIN SODIUM 40 MG: 40 INJECTION SUBCUTANEOUS at 08:23

## 2020-08-11 RX ADMIN — METOCLOPRAMIDE 5 MG: 10 TABLET ORAL at 08:23

## 2020-08-11 RX ADMIN — INSULIN LISPRO 4 UNITS: 100 INJECTION, SOLUTION INTRAVENOUS; SUBCUTANEOUS at 11:58

## 2020-08-11 RX ADMIN — MELATONIN TAB 3 MG 3 MG: 3 TAB at 23:11

## 2020-08-11 RX ADMIN — INSULIN LISPRO 2 UNITS: 100 INJECTION, SOLUTION INTRAVENOUS; SUBCUTANEOUS at 17:46

## 2020-08-11 RX ADMIN — SERTRALINE HYDROCHLORIDE 50 MG: 50 TABLET, FILM COATED ORAL at 08:23

## 2020-08-11 RX ADMIN — INSULIN LISPRO 1 UNITS: 100 INJECTION, SOLUTION INTRAVENOUS; SUBCUTANEOUS at 21:02

## 2020-08-11 RX ADMIN — METOCLOPRAMIDE 5 MG: 10 TABLET ORAL at 21:01

## 2020-08-11 RX ADMIN — INSULIN LISPRO 10 UNITS: 100 INJECTION, SOLUTION INTRAVENOUS; SUBCUTANEOUS at 17:45

## 2020-08-11 RX ADMIN — LEVOFLOXACIN 500 MG: 500 TABLET, FILM COATED ORAL at 08:23

## 2020-08-11 RX ADMIN — INSULIN LISPRO 2 UNITS: 100 INJECTION, SOLUTION INTRAVENOUS; SUBCUTANEOUS at 08:26

## 2020-08-11 RX ADMIN — INSULIN LISPRO 10 UNITS: 100 INJECTION, SOLUTION INTRAVENOUS; SUBCUTANEOUS at 11:58

## 2020-08-11 RX ADMIN — METOPROLOL TARTRATE 50 MG: 50 TABLET, FILM COATED ORAL at 21:00

## 2020-08-11 RX ADMIN — HYDROCODONE BITARTRATE AND ACETAMINOPHEN 1 TABLET: 5; 325 TABLET ORAL at 04:34

## 2020-08-11 RX ADMIN — SODIUM CHLORIDE TAB 1 GM 1 G: 1 TAB at 08:23

## 2020-08-11 RX ADMIN — HYDROCODONE BITARTRATE AND ACETAMINOPHEN 1 TABLET: 5; 325 TABLET ORAL at 21:02

## 2020-08-11 RX ADMIN — ASPIRIN 81 MG: 81 TABLET, CHEWABLE ORAL at 08:23

## 2020-08-11 RX ADMIN — INSULIN LISPRO 10 UNITS: 100 INJECTION, SOLUTION INTRAVENOUS; SUBCUTANEOUS at 08:27

## 2020-08-11 RX ADMIN — FAMOTIDINE 20 MG: 20 TABLET, FILM COATED ORAL at 08:23

## 2020-08-11 RX ADMIN — SODIUM CHLORIDE TAB 1 GM 1 G: 1 TAB at 17:45

## 2020-08-11 RX ADMIN — METOPROLOL TARTRATE 50 MG: 50 TABLET, FILM COATED ORAL at 08:23

## 2020-08-11 RX ADMIN — HYDROCODONE BITARTRATE AND ACETAMINOPHEN 1 TABLET: 5; 325 TABLET ORAL at 09:56

## 2020-08-11 RX ADMIN — HYDROCODONE BITARTRATE AND ACETAMINOPHEN 1 TABLET: 5; 325 TABLET ORAL at 15:00

## 2020-08-11 RX ADMIN — METRONIDAZOLE 500 MG: 250 TABLET, FILM COATED ORAL at 21:01

## 2020-08-11 RX ADMIN — INSULIN GLARGINE 30 UNITS: 100 INJECTION, SOLUTION SUBCUTANEOUS at 08:26

## 2020-08-11 ASSESSMENT — PAIN DESCRIPTION - FREQUENCY
FREQUENCY: INTERMITTENT
FREQUENCY: INTERMITTENT

## 2020-08-11 ASSESSMENT — PAIN DESCRIPTION - ONSET
ONSET: ON-GOING

## 2020-08-11 ASSESSMENT — PAIN SCALES - GENERAL
PAINLEVEL_OUTOF10: 5
PAINLEVEL_OUTOF10: 4
PAINLEVEL_OUTOF10: 5
PAINLEVEL_OUTOF10: 3
PAINLEVEL_OUTOF10: 8
PAINLEVEL_OUTOF10: 5
PAINLEVEL_OUTOF10: 5

## 2020-08-11 ASSESSMENT — PAIN DESCRIPTION - DESCRIPTORS
DESCRIPTORS: DISCOMFORT;CONSTANT
DESCRIPTORS: DISCOMFORT
DESCRIPTORS: DISCOMFORT

## 2020-08-11 ASSESSMENT — PAIN DESCRIPTION - PAIN TYPE
TYPE: ACUTE PAIN;SURGICAL PAIN

## 2020-08-11 ASSESSMENT — PAIN DESCRIPTION - LOCATION
LOCATION: GROIN

## 2020-08-11 ASSESSMENT — PAIN DESCRIPTION - ORIENTATION
ORIENTATION: LEFT

## 2020-08-11 ASSESSMENT — PAIN SCALES - WONG BAKER
WONGBAKER_NUMERICALRESPONSE: 0
WONGBAKER_NUMERICALRESPONSE: 0

## 2020-08-11 NOTE — PROGRESS NOTES
[facility]    8/11/2020 1:46 PM        Name: Kane Greenfield . Admitted: 8/7/2020  Primary Care Provider: Erin Cline MD (Tel: [phone])    Brief Course:  Perfecto Guadalupe a 78 y.o. female who presented with fever, from [address] [place] [address] home, hyperglycemia, hyponatremia, pneumonia, shortness of breath, left groin cellulitis, multiple falls patient stated that she has not been eating for the last 3 days, poor oral intake, at nursing home she had a fever , and she was febrile in the ER, her lab was significant for WBC 18.8, hemoglobin is stable at 9.7, sodium is 119, potassium 4.5, creatinine 2.3, glucose 853, anion gap acidosis 19, lactic acidosis with lactic acid 3.1, proBNP 1468, , she was started on insulin drip, she had 2 L of fluid, started on antibiotic, had a CAT scan abdomen which revealed gas-forming abscess/cellulitis in the left groin surgery was consulted from ER.  Patient was admitted to ICU for management of DKA, sepsis, rule out COVID.     8/8: Had surgery yesterday, on broad-spectrum antibiotic, will follow culture, off IV fluid, nephrology following her pain is controlled today.     8/9: Grow E. coli from the wound. Creatinine down to 1.4. CC: Weakness    Subjective:  .     Patient comfortable, did had wound dressing changed by surgery,    Reviewed interval ancillary notes    Current Medications  levoFLOXacin (LEVAQUIN) tablet 500 mg, Daily  ondansetron (ZOFRAN-ODT) disintegrating tablet 4 mg, Q8H PRN  HYDROmorphone HCl PF (DILAUDID) injection 1 mg, Q4H PRN  HYDROcodone-acetaminophen (NORCO) 5-325 MG per tablet 1 tablet, Q4H PRN  metroNIDAZOLE (FLAGYL) tablet 500 mg, 3 times per day  aspirin chewable tablet 81 mg, Daily  metoclopramide (REGLAN) tablet 5 mg, 4x Daily  sertraline (ZOLOFT) tablet 50 mg, Daily  insulin glargine (LANTUS;BASAGLAR) injection pen 30 Units, Daily  insulin lispro (1 Unit Dial) 10 Units, TID WC  melatonin tablet 3 mg, Nightly  sodium chloride flush 0.9 % injection 10 mL, 2 times per day  sodium chloride flush 0.9 % injection 10 mL, PRN  sodium chloride tablet 1 g, TID WC  acetaminophen (TYLENOL) tablet 650 mg, Q6H PRN    Or  acetaminophen (TYLENOL) suppository 650 mg, Q6H PRN  polyethylene glycol (GLYCOLAX) packet 17 g, Daily PRN  promethazine (PHENERGAN) tablet 12.5 mg, Q6H PRN    Or  ondansetron (ZOFRAN) injection 4 mg, Q6H PRN  famotidine (PEPCID) tablet 20 mg, Daily  dextrose 50 % IV solution, PRN  enoxaparin (LOVENOX) injection 40 mg, Daily  metoprolol tartrate (LOPRESSOR) tablet 50 mg, BID  glucose (GLUTOSE) 40 % oral gel 15 g, PRN  dextrose 50 % IV solution, PRN  glucagon (rDNA) injection 1 mg, PRN  dextrose 5 % solution, PRN  insulin lispro (1 Unit Dial) 0-12 Units, TID WC  insulin lispro (1 Unit Dial) 0-6 Units, Nightly        Objective:  /72   Pulse 67   Temp 98.3 °F (36.8 °C) (Oral)   Resp 14   Ht 5' 9\" (1.753 m)   Wt 250 lb (113.4 kg)   SpO2 97%   BMI 36.92 kg/m²     Intake/Output Summary (Last 24 hours) at 8/11/2020 1346  Last data filed at 8/11/2020 0956  Gross per 24 hour   Intake 85 ml   Output 2025 ml   Net -1940 ml      Wt Readings from Last 3 Encounters:   08/10/20 250 lb (113.4 kg)   Left groin dressing  General appearance:  Appears comfortable  Eyes: Sclera clear. Pupils equal.  ENT: Moist oral mucosa. Trachea midline, no adenopathy. Cardiovascular: Regular rhythm, normal S1, S2. No murmur. No edema in lower extremities  Respiratory: Not using accessory muscles. Good inspiratory effort. Clear to auscultation bilaterally, no wheeze or crackles. GI: Abdomen soft, no tenderness, not distended, normal bowel sounds  Musculoskeletal: No cyanosis in digits, neck supple  Neurology: CN 2-12 grossly intact. No speech or motor deficits  Psych: Normal affect.  Alert and oriented in time, place and person  Skin: Warm, dry, normal

## 2020-08-11 NOTE — PROGRESS NOTES
encounter diagnosis was Diabetic ketoacidosis without coma associated with type 2 diabetes mellitus ([facility] Diagnoses of General weakness, Severe sepsis ([address] Pneumonia due to organism, Suspected COVID-19 virus infection, and Cellulitis of left groin were also pertinent to this visit. has a past medical history of Diabetes mellitus ([address] has a past surgical history that includes Groin Surgery (Left, 8/7/2020). Restrictions  Restrictions/Precautions  Restrictions/Precautions: Fall Risk(high fall risk)  Required Braces or Orthoses?: No  Position Activity Restriction  Other position/activity restrictions: Quang Lee is a 78 y.o. female who presented with fever, from what [address] [place] [address] home, hyperglycemia, hyponatremia, pneumonia, shortness of breath, left groin cellulitis, multiple falls patient stated that she has not been eating for the last 3 days, poor oral intake, at nursing home she had a fever , and she was febrile in the ER, her lab was significant for WBC 18.8, hemoglobin is stable at 9.7, sodium is 119, potassium 4.5, creatinine 2.3, glucose 853, anion gap acidosis 19, lactic acidosis with lactic acid 3.1, proBNP 1468, , she was started on insulin drip, she had 2 L of fluid, started on antibiotic, had a CAT scan abdomen which revealed gas-forming abscess/cellulitis in the left groin surgery was consulted from ER. Patient was admitted to ICU for management of DKA, sepsis. Vision/Hearing  Vision: Impaired  Vision Exceptions: Wears glasses for reading  Hearing: Within functional limits     Subjective  General  Chart Reviewed: Yes  Patient assessed for rehabilitation services?: Yes  Family / Caregiver Present: No  Diagnosis: Diabetic ketoacidosis without coma associated with type 2 diabetes mellitus ([address]  Follows Commands: Within Functional Limits  General Comment  Comments: Upone arrival, pt found supine in bed. Subjective  Subjective: 5/10 pain in L groin incision, nursing notified.  Pt agreeable for therapy. Pain Screening  Patient Currently in Pain: Yes(RN notifed of pain level)  Pain Assessment  Pain Level: 5       Orientation     Social/Functional History  Social/Functional History  Lives With: Alone([place])  Type of Home: Assisted living  Home Layout: One level  Home Access: Level entry  Bathroom Shower/Tub: Tub/Shower unit, Shower chair with back  Bathroom Toilet: Standard  Bathroom Equipment: Grab bars in shower, Hand-held shower  Bathroom Accessibility: Walker accessible  Home Equipment: 4 wheeled walker, Alert Button(alert button built into apt)  Receives Help From: Family  ADL Assistance: Independent(did inpendent bathing/dressing)  Homemaking Assistance: Needs assistance(cleaning apt and meal prep assistance)  Homemaking Responsibilities: No  Ambulation Assistance: Independent(with RW)  Transfer Assistance: Independent  Active : No  Additional Comments: 3-4 falls in past month; pt is a questionable historian w/ variable reports of functional mobility status and distance at baseline.   Cognition        Objective          AROM RLE (degrees)  RLE AROM: WFL  AROM LLE (degrees)  LLE AROM : WFL  Strength RLE  Strength RLE: WFL  Comment: 4/5 grossly MMT; somewhat limited assessment due to pain upon palpation/neuropathy  Strength LLE  Strength LLE: WFL  Comment: 4/5 grossly MMT; somewhat limited assessment due to pain upon palpation/neuropathy     Sensation  Overall Sensation Status: (pain with BLE upon palpation; neuropathy BLE)  Bed mobility  Supine to Sit: Stand by assistance(verbal and tactile cueing for UE placement)  Comment: Increased time to complete supine to sit  Transfers  Sit to Stand: Contact guard assistance(dizziness reported sitting at EOB)  Stand to sit: Stand by assistance(Pt had good eccentric BUE control to lower into chair from standing)  Ambulation  Ambulation?: Yes  Ambulation 1  Surface: level tile  Device: Rolling Walker  Assistance: Contact guard

## 2020-08-11 NOTE — PROGRESS NOTES
Shift assessment complete. Vital signs stable. Neurologically  Intact. A&O x4. Dressing to left groin soiled. New wet to dry dressing applied per order. Mepilex applied to wound on buttock. Llanos catheter care provided with soap and water. Linens changed and pt repositioned up in bed. Patient gagging with medications. State's she has been nauseated and unable to take pills. Medications administered crushed in applesauce. Tolerated well. PO phenergan administered for complaints of nausea. Still with poor PO intake. Discussed relation between adequate PO intake and wound healing. Pt may need supplements delivered on meal tray. The care plan and education has been reviewed and mutually agreed upon with the patient. Fall precautions in place and call light within reach. All needs met at this time. Will continue to monitor.

## 2020-08-11 NOTE — PROGRESS NOTES
MD Delon Byrne MD Christiana Haggard, MD                  Office: [phone]                 Fax: [phone] [address] [facility]                                                           Renal inpatient Progress note:     PATIENT NAME: Anjel Marquez  : 1950  MRN: [id number]      Patient Active Problem List   Diagnosis    DKA, type 2, not at goal [facility])    Diabetic ketoacidosis without coma associated with type 2 diabetes mellitus ([facility]    Severe sepsis ([facility]    Lactic acidosis    Atelectasis    Necrotizing soft tissue infection    Cellulitis of left groin       Past Medical History:   has a past medical history of Diabetes mellitus ([facility] Past Social History:   reports that she has never smoked. She has never used smokeless tobacco. She reports that she does not drink alcohol or use drugs. Subjective:  No complaints  . out of ICU           Objective:      /73   Pulse 86   Temp 98 °F (36.7 °C) (Oral)   Resp 14   Ht 5' 9\" (1.753 m)   Wt 250 lb (113.4 kg)   SpO2 94%   BMI 36.92 kg/m²     Wt Readings from Last 3 Encounters:   08/10/20 250 lb (113.4 kg)       BP Readings from Last 3 Encounters:   20 132/73   20 (!) 95/58       Chest- clear  Heart-regular  Abd-soft  Ext- 1+ edema    Labs  Hemoglobin   Date Value Ref Range Status   2020 8.2 (L) 12.0 - 16.0 g/dL Final     Hematocrit   Date Value Ref Range Status   2020 24.9 (L) 36.0 - 48.0 % Final     WBC   Date Value Ref Range Status   2020 10.1 4.0 - 11.0 K/uL Final     Platelets   Date Value Ref Range Status   2020 371 135 - 450 K/uL Final     Lab Results   Component Value Date    CREATININE 1.2 2020    BUN 30 (H) 2020     2020    K 4.0 2020     2020    CO2 23 2020     Uosm 376  Semaj less than 20    Assessment/Plan:  1.   DEVON on CKD III, baseline creatinine 1.4.    - Etiology of DEVON probably due to prerenal azotemia in the setting of elevated blood glucose,  relative hypotension, ARB and Lasix use. - remains Better. Non-oliguric.   - No need for maintenance IVF at this time. Can bolus PRN. - continue to monitoring as resolving. ..      2.  Hyponatremia,   Unclear duration of hyponatremia. Improving.   - Due to increased ADH from decreased intravascular volume. - She does have medications that can increase ADH release including Sertraline.    - Will continue NaCl tabs    - s/p low dose Lasix po   - to decrease medullary concentrating gradient. - as improved w above     3. Respiratory alkalosis plus anion gap metabolic acidosis. Control blood glucose. Support blood pressure. Monitor,   4. Rhabdomyolysis. Hold fibrate and statin for now. Better. 5.  Hypoalbuminemia. 6.  Diabetes mellitus with severely elevated blood glucose. Management as per Medicine. 7.  Left Groin Cellulitis- on Abx. 8.  Chronic Left Hydronephrosis with renal atrophy  9. Decreased chem labs to daily    High complexity. Multiple medical problems. Discussed with patient and treatment team.    Thank you for allowing me to participate in this patient's care. Please do not hesitate to contact me for any questions/concerns. We will follow along with you.      Izabel Macias MD  [facility] [address]   Phone: [phone] or [address] [employer]  Fax: [phone]

## 2020-08-11 NOTE — DISCHARGE INSTR - COC
Continuity of Care Form    Patient Name: Kirstie Arambula   :  1950  MRN:  [id number]    Admit date:  2020  Discharge date:  20    Code Status Order: Full Code   Advance Directives:   [facility] Documentation     Date/Time Healthcare Directive Type of Healthcare Directive Copy in [facility] [address] Agent's Name Healthcare Agent's Phone Number    20 1602  No, patient does not have an advance directive for healthcare treatment -- -- -- -- --    20 1438  No, patient does not have an advance directive for healthcare treatment -- -- -- -- --          Admitting Physician:  Ryann Julien MD  PCP: Glenys Canchola MD    Discharging Nurse: Donovan Sutton [facility] Unit/Room#: 1HH-2480/9079-36  Discharging Unit Phone Number: [phone]    Emergency Contact:   Extended Emergency Contact Information  Primary Emergency Contact: Letty Vu  [place] Phone: [phone]  Relation: Child  Secondary Emergency Contact: [address] Phone: [phone]  Relation: Spouse    Past Surgical History:  Past Surgical History:   Procedure Laterality Date    GROIN SURGERY Left 2020    INCISION AND DRAINAGE OF LEFT GROIN AND LEFT UPPER THIGH, DEBRIDEMENT OF LEFT GROIN AND LEFT UPPER THIGH performed by Papo Ny MD at [address] [place] [address]       Immunization History: There is no immunization history on file for this patient.     Active Problems:  Patient Active Problem List   Diagnosis Code    DKA, type 2, not at goal [facility]) E11.10    Diabetic ketoacidosis without coma associated with type 2 diabetes mellitus ([facility] E11.10    Severe sepsis ([facility] A41.9, R65.20    Lactic acidosis E87.2    Atelectasis J98.11    Necrotizing soft tissue infection M79.89    Cellulitis of left groin L03.314       Isolation/Infection:   Isolation          No Isolation        Patient Infection Status     Infection Onset Added Last Indicated Last Indicated By Review Planned Expiration Resolved Resolved By    None active    Resolved    COVID-19 Rule Out 08/07/20 08/07/20 08/07/20 COVID-19 (Ordered)   08/09/20 Rule-Out Test Resulted          Nurse Assessment:  Last Vital Signs: /72   Pulse 67   Temp 98.3 °F (36.8 °C) (Oral)   Resp 14   Ht 5' 9\" (1.753 m)   Wt 250 lb (113.4 kg)   SpO2 97%   BMI 36.92 kg/m²     Last documented pain score (0-10 scale): Pain Level: 3  Last Weight:   Wt Readings from Last 1 Encounters:   08/10/20 250 lb (113.4 kg)     Mental Status:  oriented and alert    IV Access:  - None    Nursing Mobility/ADLs:  Walking   Assisted  Transfer  Assisted  Bathing  Assisted  Dressing  Assisted  Toileting  Assisted and dependent with incontinence  Feeding  Assisted  Med Admin  Assisted  Med Delivery   whole with pudding    Wound Care Documentation and Therapy:        Elimination:  Continence:   · Bowel: No  · Bladder: No  Urinary Catheter: Removal Date 8/11/20   Colostomy/Ileostomy/Ileal Conduit: No       Date of Last BM: 8/12/20    Intake/Output Summary (Last 24 hours) at 8/11/2020 1358  Last data filed at 8/11/2020 0956  Gross per 24 hour   Intake 85 ml   Output 2025 ml   Net -1940 ml     I/O last 3 completed shifts: In: 80 [P.O.:85]  Out: 1725 [Urine:1725]    Safety Concerns:     History of Falls (last 30 days) and At Risk for Falls    Impairments/Disabilities:      None    Nutrition Therapy:  Current Nutrition Therapy:   - Oral Diet:  carb control    Routes of Feeding: Oral  Liquids: Thin Liquids  Daily Fluid Restriction: no  Last Modified Barium Swallow with Video (Video Swallowing Test): not done    Treatments at the Time of Hospital Discharge:   Respiratory Treatments:   Oxygen Therapy:  is not on home oxygen therapy.   Ventilator:    - No ventilator support    Rehab Therapies: Physical Therapy and Occupational Therapy  Weight Bearing Status/Restrictions: No weight bearing restirctions  Other Medical Equipment (for information only, NOT a DME order):

## 2020-08-11 NOTE — PROGRESS NOTES
Shift assessment complete, am medications given. Bed alarm on call light within reach.  Dressing to left groin CDI without drainage

## 2020-08-11 NOTE — PROGRESS NOTES
AMYLASE  Lipase:    Lab Results   Component Value Date    LIPASE 25.0 08/07/2020      Mag:    Lab Results   Component Value Date    MG 2.00 08/11/2020    MG 2.10 08/10/2020     Phos:     Lab Results   Component Value Date    PHOS 3.2 08/11/2020    PHOS 3.1 08/10/2020      Coags:   Lab Results   Component Value Date    PROTIME 15.0 08/07/2020    INR 1.29 08/07/2020       Cultures:  Anaerobic culture  Lab Results   Component Value Date    LABANAE  08/07/2020     Anaerobic culture further report to follow  No anaerobes isolated so far, Further report to follow       Fungus stain  Results in Past 30 Days  Result Component Current Result Ref Range Previous Result Ref Range   Fungus Stain No Fungal elements seen (8/7/2020)  Not in Time Range      Gram stain  Results in Past 30 Days  Result Component Current Result Ref Range Previous Result Ref Range   Gram Stain Result No Epithelial Cells seen  3+ WBC's (Polymorphonuclear)  1+ Gram positive cocci  1+ Gram negative rods   (A) (8/10/2020)  CANCELED (8/7/2020)      Organism  Lab Results   Component Value Date/Time    ORG Gram negative carmen (A) 08/10/2020 11:55 AM     Surgical culture  Lab Results   Component Value Date/Time    CXSURG (A) 08/07/2020 05:49 PM     Mixed skin patricio,  Light growth  No further workup      CXSURG Light growth 08/07/2020 05:49 PM     Blood culture 1  Results in Past 30 Days  Result Component Current Result Ref Range Previous Result Ref Range   Blood Culture, Routine No Growth after 4 days of incubation. (8/7/2020)  Not in Time Range      Blood culture 2  Results in Past 30 Days  Result Component Current Result Ref Range Previous Result Ref Range   Culture, Blood 2 No Growth after 4 days of incubation. (8/7/2020)  Not in Time Range      Fecal occult  No results found for requested labs within last 30 days. GI bacterial pathogens by PCR  No results found for requested labs within last 30 days.      C. difficile  No results found for requested labs within last 30 days. Urine culture  No results found for: LABURIN    Pathology:  No relevant pathology     Imaging:  I have personally reviewed the following films:    No results found. Scheduled Meds:   levoFLOXacin  500 mg Oral Daily    metroNIDAZOLE  500 mg Oral 3 times per day    aspirin  81 mg Oral Daily    metoclopramide  5 mg Oral 4x Daily    sertraline  50 mg Oral Daily    insulin glargine  30 Units Subcutaneous Daily    insulin lispro  10 Units Subcutaneous TID WC    melatonin  3 mg Oral Nightly    sodium chloride flush  10 mL Intravenous 2 times per day    sodium chloride  1 g Oral TID WC    famotidine  20 mg Oral Daily    enoxaparin  40 mg Subcutaneous Daily    metoprolol tartrate  50 mg Oral BID    insulin lispro  0-12 Units Subcutaneous TID WC    insulin lispro  0-6 Units Subcutaneous Nightly     Continuous Infusions:   dextrose       PRN Meds:.ondansetron, HYDROmorphone, HYDROcodone 5 mg - acetaminophen, sodium chloride flush, acetaminophen **OR** acetaminophen, polyethylene glycol, promethazine **OR** ondansetron, dextrose, glucose, dextrose, glucagon (rDNA), dextrose      Assessment:  78 y.o. female admitted with   1. Diabetic ketoacidosis without coma associated with type 2 diabetes mellitus ([facility]    2. General weakness    3. Severe sepsis ([facility]    4. Pneumonia due to organism    5. Suspected COVID-19 virus infection    6. Cellulitis of left groin        Status-post excisional debridement of skin, subcutaneous tissue and muscle with a total surface area of 345 cm2 on 8/7/2020 for necrotizing left groin infection  DKA  Lactic acidosis  Pneumonia  Hyponatremia  Hypotension  Acute kidney injury on CKD, stage 3  Anemia  Medical coagulapathy, on Brilinta (last dose on 8/7/2020)      Plan:  1. Local wound care with wet-to-dry dressings daily and PRN, follow cultures; remove Llanos catheter--dressing may need more frequent changing if becoming contaminated with urine  2.  Carb control diet as tolerated  3. Antibiotics  4. Activity as tolerated, ambulate TID, up to chair for all meals--PT/OT treatment and evaluation  5. PRN analgesics and antiemetics--minimizing narcotics as tolerated, transition to PO  6. DVT prophylaxis with Lovenox and SCD's  7. Management of medical comorbid etiologies per primary team and consulting services  8. Disposition: Discharge planning    EDUCATION:  Educated patient on plan of care and disease process--all questions answered. Plans discussed with patient and nursing. Reviewed and discussed with Dr. Sumaya Sanchez.       Signed:  CHAUNCEY Duran - CNP  8/11/2020 3:19 PM     Okay for discharge when arrangements made

## 2020-08-11 NOTE — PROGRESS NOTES
orthostatic/dizziness reported upon initial sit on EOB and in stance  Safety Devices  Safety Devices in place: Yes  Type of devices: Left in chair;Call light within reach; Chair alarm in place;Nurse notified; All fall risk precautions in place         Patient Diagnosis(es): The primary encounter diagnosis was Diabetic ketoacidosis without coma associated with type 2 diabetes mellitus ([facility] Diagnoses of General weakness, Severe sepsis ([facility] Pneumonia due to organism, Suspected COVID-19 virus infection, and Cellulitis of left groin were also pertinent to this visit. has a past medical history of Diabetes mellitus ([facility] has a past surgical history that includes Groin Surgery (Left, 8/7/2020). Restrictions  Restrictions/Precautions  Restrictions/Precautions: Fall Risk(high fall risk)  Required Braces or Orthoses?: No  Position Activity Restriction  Other position/activity restrictions: Jeffrey Chapman is a 78 y.o. female who presented with fever, from what [address] home, hyperglycemia, hyponatremia, pneumonia, shortness of breath, left groin cellulitis, multiple falls patient stated that she has not been eating for the last 3 days, poor oral intake, at nursing home she had a fever , and she was febrile in the ER, her lab was significant for WBC 18.8, hemoglobin is stable at 9.7, sodium is 119, potassium 4.5, creatinine 2.3, glucose 853, anion gap acidosis 19, lactic acidosis with lactic acid 3.1, proBNP 1468, , she was started on insulin drip, she had 2 L of fluid, started on antibiotic, had a CAT scan abdomen which revealed gas-forming abscess/cellulitis in the left groin surgery was consulted from ER. Patient was admitted to ICU for management of DKA, sepsis.     Subjective   General  Chart Reviewed: Yes  Patient assessed for rehabilitation services?: Yes  Family / Caregiver Present: No  Diagnosis: DKA, Sepsis  Subjective  Subjective: Pt supine in bed at entry, agreeable to eval  Pain Assessment  Pain Assessment: 0-10  Pain Level: 8  Pain Type: Acute pain;Surgical pain  Pain Location: Groin  Pain Orientation: Left  Pain Descriptors: Discomfort; Constant  Pain Onset: On-going  Non-Pharmaceutical Pain Intervention(s): Emotional support;Repositioned  Pre Treatment Pain Screening  Comments / Details: pt reporting pain w/ palpation of bilat lower LEs during LE bathing, no pain reported upon stance or further functional tasks  Vital Signs  Patient Currently in Pain: Yes(RN notifed of pain level)          Objective    ADL  UE Bathing: Setup;Stand by assistance(EOB w/ warm wipes)  LE Bathing: Moderate assistance(pt able to wash upper LEs, assist for feet and lower LE)  UE Dressing: Setup;Supervision(doffing/donning gown seated EOB)  LE Dressing: Dependent/Total(to don socks, assist for brief tab management)  Toileting: Dependent/Total(purwick present at entry, removed during functional tasks and new one replaced at end of session)  Additional Comments: increased time for ADL completion, pt reporting overactive bladder and episodes of incontinence at baseline- purwick utilized to maintain dry wound dressing to groin. Balance  Sitting Balance: Supervision  Standing Balance: Contact guard assistance  Standing Balance  Time: 6-8min total  Activity: functional mobility and transfers  Comment: no LOB noted- pt reporting dizziness upon EOB sitting (/79) and upon initial stance, symptoms gradually subsiding w/ sitting/stand rest break and sips of water  Functional Mobility  Functional - Mobility Device: Rolling Walker  Activity: Other  Assist Level: Contact guard assistance  Functional Mobility Comments: ~6ft within room, pt requiring chair to be pulled behind d/t fatigue/dizziness.  slow gait w/ short step length  Bed mobility  Supine to Sit: Stand by assistance(verbal/tactile cues for hand placement and sequencing)  Scooting: Stand by assistance  Comment: increased time to complete, HOB elevated, use of HR  Transfers  Sit to stand: Contact guard assistance  Stand to sit: Contact guard assistance  Transfer Comments: EOB>RW and RW>recliner, cues for hand placement              Cognition  Overall Cognitive Status: Exceptions  Arousal/Alertness: Appropriate responses to stimuli  Following Commands:  Follows one step commands consistently  Attention Span: Appears intact  Memory: Appears intact  Safety Judgement: Decreased awareness of need for assistance  Initiation: Requires cues for some  Sequencing: Requires cues for some           LUE AROM (degrees)  LUE AROM : WFL  RUE AROM (degrees)  RUE AROM : WFL                 Plan   Plan  Times per week: 3-5x  Times per day: Daily  Current Treatment Recommendations: Strengthening, Functional Mobility Training, Endurance Training, Safety Education & Training, Self-Care / ADL, Patient/Caregiver Education & Training    AM-PAC Score        AM-[facility] Inpatient Daily Activity Raw Score: 15 (08/11/20 1538)  AM-PAC Inpatient ADL T-Scale Score : 34.69 (08/11/20 1538)  ADL Inpatient CMS 0-100% Score: 56.46 (08/11/20 1538)  ADL Inpatient CMS G-Code Modifier : CK (08/11/20 1538)    Goals  Short term goals  Time Frame for Short term goals: D/C  Short term goal 1: Cam for LB dressing/bathing with AE as needed  Short term goal 2: Cam for functional transfers for ADL completion  Short term goal 3: Cam for functional mobility for ADL completion  Short term goal 4: Cam for toileting  Short term goal 5: standing tolerance of 5+ min during functional task/ADL completion       Therapy Time   Individual Concurrent Group Co-treatment   Time In       1406   Time Out       1446   Minutes       40        Timed Code Treatment Minutes:  25 Minutes    Total Treatment Minutes:  [id number] [facility], [facility] [address] [place] [address]

## 2020-08-12 VITALS
DIASTOLIC BLOOD PRESSURE: 62 MMHG | HEART RATE: 74 BPM | HEIGHT: 69 IN | SYSTOLIC BLOOD PRESSURE: 119 MMHG | RESPIRATION RATE: 18 BRPM | WEIGHT: 250 LBS | TEMPERATURE: 98.1 F | OXYGEN SATURATION: 96 % | BODY MASS INDEX: 37.03 KG/M2

## 2020-08-12 LAB
ANION GAP SERPL CALCULATED.3IONS-SCNC: 9 MMOL/L (ref 3–16)
BANDED NEUTROPHILS RELATIVE PERCENT: 7 % (ref 0–7)
BASOPHILS ABSOLUTE: 0 K/UL (ref 0–0.2)
BASOPHILS RELATIVE PERCENT: 0 %
BUN BLDV-MCNC: 25 MG/DL (ref 7–20)
CALCIUM IONIZED: 1.14 MMOL/L (ref 1.12–1.32)
CALCIUM SERPL-MCNC: 9.1 MG/DL (ref 8.3–10.6)
CHLORIDE BLD-SCNC: 105 MMOL/L (ref 99–110)
CO2: 24 MMOL/L (ref 21–32)
CREAT SERPL-MCNC: 1 MG/DL (ref 0.6–1.2)
EOSINOPHILS ABSOLUTE: 0.6 K/UL (ref 0–0.6)
EOSINOPHILS RELATIVE PERCENT: 5 %
GFR AFRICAN AMERICAN: >60
GFR NON-AFRICAN AMERICAN: 55
GLUCOSE BLD-MCNC: 114 MG/DL (ref 70–99)
GLUCOSE BLD-MCNC: 119 MG/DL (ref 70–99)
GLUCOSE BLD-MCNC: 152 MG/DL (ref 70–99)
GLUCOSE BLD-MCNC: 215 MG/DL (ref 70–99)
GRAM STAIN RESULT: ABNORMAL
HCT VFR BLD CALC: 25.7 % (ref 36–48)
HEMOGLOBIN: 8.4 G/DL (ref 12–16)
LYMPHOCYTES ABSOLUTE: 3.3 K/UL (ref 1–5.1)
LYMPHOCYTES RELATIVE PERCENT: 28 %
MAGNESIUM: 1.9 MG/DL (ref 1.8–2.4)
MCH RBC QN AUTO: 30.5 PG (ref 26–34)
MCHC RBC AUTO-ENTMCNC: 32.7 G/DL (ref 31–36)
MCV RBC AUTO: 93.4 FL (ref 80–100)
MONOCYTES ABSOLUTE: 0.7 K/UL (ref 0–1.3)
MONOCYTES RELATIVE PERCENT: 6 %
NEUTROPHILS ABSOLUTE: 7.2 K/UL (ref 1.7–7.7)
NEUTROPHILS RELATIVE PERCENT: 54 %
ORGANISM: ABNORMAL
PDW BLD-RTO: 14.9 % (ref 12.4–15.4)
PERFORMED ON: ABNORMAL
PH VENOUS: 7.49 (ref 7.35–7.45)
PHOSPHORUS: 3.3 MG/DL (ref 2.5–4.9)
PLATELET # BLD: 390 K/UL (ref 135–450)
PLATELET SLIDE REVIEW: ADEQUATE
PMV BLD AUTO: 6.3 FL (ref 5–10.5)
POLYCHROMASIA: ABNORMAL
POTASSIUM SERPL-SCNC: 4.1 MMOL/L (ref 3.5–5.1)
RBC # BLD: 2.75 M/UL (ref 4–5.2)
SLIDE REVIEW: ABNORMAL
SODIUM BLD-SCNC: 138 MMOL/L (ref 136–145)
TOXIC GRANULATION: PRESENT
WBC # BLD: 11.8 K/UL (ref 4–11)
WOUND/ABSCESS: ABNORMAL
WOUND/ABSCESS: ABNORMAL

## 2020-08-12 PROCEDURE — 6370000000 HC RX 637 (ALT 250 FOR IP): Performed by: SURGERY

## 2020-08-12 PROCEDURE — 6360000002 HC RX W HCPCS: Performed by: SURGERY

## 2020-08-12 PROCEDURE — 6370000000 HC RX 637 (ALT 250 FOR IP): Performed by: INTERNAL MEDICINE

## 2020-08-12 PROCEDURE — APPSS15 APP SPLIT SHARED TIME 0-15 MINUTES: Performed by: NURSE PRACTITIONER

## 2020-08-12 PROCEDURE — 2580000003 HC RX 258: Performed by: INTERNAL MEDICINE

## 2020-08-12 PROCEDURE — 82330 ASSAY OF CALCIUM: CPT

## 2020-08-12 PROCEDURE — 6370000000 HC RX 637 (ALT 250 FOR IP): Performed by: STUDENT IN AN ORGANIZED HEALTH CARE EDUCATION/TRAINING PROGRAM

## 2020-08-12 PROCEDURE — 83735 ASSAY OF MAGNESIUM: CPT

## 2020-08-12 PROCEDURE — 85025 COMPLETE CBC W/AUTO DIFF WBC: CPT

## 2020-08-12 PROCEDURE — APPNB30 APP NON BILLABLE TIME 0-30 MINS: Performed by: NURSE PRACTITIONER

## 2020-08-12 PROCEDURE — 84100 ASSAY OF PHOSPHORUS: CPT

## 2020-08-12 PROCEDURE — 80048 BASIC METABOLIC PNL TOTAL CA: CPT

## 2020-08-12 RX ORDER — SODIUM CHLORIDE 1000 MG
1 TABLET, SOLUBLE MISCELLANEOUS 2 TIMES DAILY WITH MEALS
Status: DISCONTINUED | OUTPATIENT
Start: 2020-08-12 | End: 2020-08-12 | Stop reason: HOSPADM

## 2020-08-12 RX ORDER — LEVOFLOXACIN 500 MG/1
500 TABLET, FILM COATED ORAL DAILY
Qty: 10 TABLET | Refills: 0 | Status: SHIPPED | OUTPATIENT
Start: 2020-08-13 | End: 2020-08-23

## 2020-08-12 RX ORDER — OXYCODONE HYDROCHLORIDE AND ACETAMINOPHEN 5; 325 MG/1; MG/1
1 TABLET ORAL EVERY 6 HOURS PRN
Qty: 10 TABLET | Refills: 0 | Status: SHIPPED | OUTPATIENT
Start: 2020-08-12 | End: 2020-08-15

## 2020-08-12 RX ADMIN — ENOXAPARIN SODIUM 40 MG: 40 INJECTION SUBCUTANEOUS at 08:17

## 2020-08-12 RX ADMIN — SERTRALINE HYDROCHLORIDE 50 MG: 50 TABLET, FILM COATED ORAL at 08:15

## 2020-08-12 RX ADMIN — METOCLOPRAMIDE 5 MG: 10 TABLET ORAL at 08:16

## 2020-08-12 RX ADMIN — INSULIN LISPRO 10 UNITS: 100 INJECTION, SOLUTION INTRAVENOUS; SUBCUTANEOUS at 12:38

## 2020-08-12 RX ADMIN — METOCLOPRAMIDE 5 MG: 10 TABLET ORAL at 17:16

## 2020-08-12 RX ADMIN — INSULIN GLARGINE 30 UNITS: 100 INJECTION, SOLUTION SUBCUTANEOUS at 08:14

## 2020-08-12 RX ADMIN — INSULIN LISPRO 10 UNITS: 100 INJECTION, SOLUTION INTRAVENOUS; SUBCUTANEOUS at 08:14

## 2020-08-12 RX ADMIN — SODIUM CHLORIDE TAB 1 GM 1 G: 1 TAB at 17:17

## 2020-08-12 RX ADMIN — FAMOTIDINE 20 MG: 20 TABLET, FILM COATED ORAL at 08:15

## 2020-08-12 RX ADMIN — HYDROCODONE BITARTRATE AND ACETAMINOPHEN 1 TABLET: 5; 325 TABLET ORAL at 14:58

## 2020-08-12 RX ADMIN — INSULIN LISPRO 4 UNITS: 100 INJECTION, SOLUTION INTRAVENOUS; SUBCUTANEOUS at 12:36

## 2020-08-12 RX ADMIN — ASPIRIN 81 MG: 81 TABLET, CHEWABLE ORAL at 08:15

## 2020-08-12 RX ADMIN — HYDROCODONE BITARTRATE AND ACETAMINOPHEN 1 TABLET: 5; 325 TABLET ORAL at 05:45

## 2020-08-12 RX ADMIN — SODIUM CHLORIDE TAB 1 GM 1 G: 1 TAB at 08:16

## 2020-08-12 RX ADMIN — METRONIDAZOLE 500 MG: 250 TABLET, FILM COATED ORAL at 05:45

## 2020-08-12 RX ADMIN — INSULIN LISPRO 2 UNITS: 100 INJECTION, SOLUTION INTRAVENOUS; SUBCUTANEOUS at 08:13

## 2020-08-12 RX ADMIN — METOCLOPRAMIDE 5 MG: 10 TABLET ORAL at 12:36

## 2020-08-12 RX ADMIN — SODIUM CHLORIDE TAB 1 GM 1 G: 1 TAB at 12:36

## 2020-08-12 RX ADMIN — METOPROLOL TARTRATE 50 MG: 50 TABLET, FILM COATED ORAL at 08:15

## 2020-08-12 RX ADMIN — LEVOFLOXACIN 500 MG: 500 TABLET, FILM COATED ORAL at 08:16

## 2020-08-12 ASSESSMENT — PAIN DESCRIPTION - ORIENTATION
ORIENTATION: LEFT

## 2020-08-12 ASSESSMENT — PAIN DESCRIPTION - PROGRESSION
CLINICAL_PROGRESSION: GRADUALLY IMPROVING
CLINICAL_PROGRESSION: GRADUALLY WORSENING
CLINICAL_PROGRESSION: GRADUALLY IMPROVING

## 2020-08-12 ASSESSMENT — PAIN DESCRIPTION - FREQUENCY
FREQUENCY: INTERMITTENT
FREQUENCY: INTERMITTENT

## 2020-08-12 ASSESSMENT — PAIN DESCRIPTION - ONSET
ONSET: ON-GOING
ONSET: ON-GOING

## 2020-08-12 ASSESSMENT — PAIN DESCRIPTION - DESCRIPTORS
DESCRIPTORS: DISCOMFORT
DESCRIPTORS: ACHING;DISCOMFORT

## 2020-08-12 ASSESSMENT — PAIN DESCRIPTION - LOCATION
LOCATION: GROIN

## 2020-08-12 ASSESSMENT — PAIN SCALES - GENERAL
PAINLEVEL_OUTOF10: 3
PAINLEVEL_OUTOF10: 4
PAINLEVEL_OUTOF10: 2
PAINLEVEL_OUTOF10: 5

## 2020-08-12 ASSESSMENT — PAIN DESCRIPTION - PAIN TYPE
TYPE: SURGICAL PAIN
TYPE: SURGICAL PAIN;ACUTE PAIN
TYPE: ACUTE PAIN;SURGICAL PAIN

## 2020-08-12 ASSESSMENT — PAIN - FUNCTIONAL ASSESSMENT: PAIN_FUNCTIONAL_ASSESSMENT: PREVENTS OR INTERFERES SOME ACTIVE ACTIVITIES AND ADLS

## 2020-08-12 NOTE — PROGRESS NOTES
MD Judith Astudillo MD Idolina Magyar, MD                  Office: [phone]                 Fax: [phone]         [facility]                                                           Renal inpatient Progress note:     PATIENT NAME: Lena Duran  : 1950  MRN: [id number]      Patient Active Problem List   Diagnosis    DKA, type 2, not at goal [facility])    Diabetic ketoacidosis without coma associated with type 2 diabetes mellitus ([facility]    Severe sepsis ([facility]    Lactic acidosis    Atelectasis    Necrotizing soft tissue infection    Cellulitis of left groin       Past Medical History:   has a past medical history of Diabetes mellitus ([facility] Past Social History:   reports that she has never smoked. She has never used smokeless tobacco. She reports that she does not drink alcohol or use drugs. Subjective:  No complaints  . Seen resting in bed       Objective:      BP (!) 172/69   Pulse 79   Temp 98.2 °F (36.8 °C) (Oral)   Resp 16   Ht 5' 9\" (1.753 m)   Wt 250 lb (113.4 kg)   SpO2 96%   BMI 36.92 kg/m²     Wt Readings from Last 3 Encounters:   08/10/20 250 lb (113.4 kg)       BP Readings from Last 3 Encounters:   20 (!) 172/69   20 (!) 95/58       Chest- clear  Heart-regular  Abd-soft  Ext- 1+ edema    Labs  Hemoglobin   Date Value Ref Range Status   2020 8.4 (L) 12.0 - 16.0 g/dL Final     Hematocrit   Date Value Ref Range Status   2020 25.7 (L) 36.0 - 48.0 % Final     WBC   Date Value Ref Range Status   2020 11.8 (H) 4.0 - 11.0 K/uL Final     Platelets   Date Value Ref Range Status   2020 390 135 - 450 K/uL Final     Lab Results   Component Value Date    CREATININE 1.0 2020    BUN 25 (H) 2020     2020    K 4.1 2020     2020    CO2 24 2020     Uosm 376  Semaj less than 20    Assessment / Plan:  1.   DEVON on CKD III, baseline creatinine 1.4.   improving   - Etiology of DEVON probably

## 2020-08-12 NOTE — PLAN OF CARE
Problem: Falls - Risk of:  Goal: Will remain free from falls  Description: Will remain free from falls  8/12/2020 1105 by Alexia Gray RN  Outcome: Ongoing  8/12/2020 0218 by Keri Darden  Outcome: Ongoing  Goal: Absence of physical injury  Description: Absence of physical injury  Outcome: Ongoing     Problem: Skin Integrity:  Goal: Will show no infection signs and symptoms  Description: Will show no infection signs and symptoms  Outcome: Ongoing  Goal: Absence of new skin breakdown  Description: Absence of new skin breakdown  Outcome: Ongoing     Problem: Discharge Planning:  Goal: Discharged to appropriate level of care  Description: Discharged to appropriate level of care  Outcome: Ongoing     Problem: Fluid Volume - Imbalance:  Goal: Will remain free of signs and symptoms of dehydration  Description: Will remain free of signs and symptoms of dehydration  Outcome: Ongoing  Goal: Absence of imbalanced fluid volume signs and symptoms  Description: Absence of imbalanced fluid volume signs and symptoms  Outcome: Ongoing     Problem: Serum Glucose Level - Abnormal:  Goal: Ability to maintain appropriate glucose levels will improve  Description: Ability to maintain appropriate glucose levels will improve  8/12/2020 1105 by Alexia Gray RN  Outcome: Ongoing  8/12/2020 0218 by Keri Darden  Outcome: Ongoing     Problem: Injury - Acid Base Imbalance:  Goal: Acid-base balance  Description: Acid-base balance  Outcome: Ongoing     Problem: Pain:  Goal: Pain level will decrease  Description: Pain level will decrease  Outcome: Ongoing  Goal: Control of acute pain  Description: Control of acute pain  Outcome: Ongoing  Goal: Control of chronic pain  Description: Control of chronic pain  Outcome: Ongoing

## 2020-08-12 NOTE — PLAN OF CARE
Nutrition Problem #1: Increased nutrient needs  Intervention: Food and/or Nutrient Delivery: Continue Current Diet, Start Oral Nutrition Supplement  Nutritional Goals: Pt will consume at least 50% of meals and supplements

## 2020-08-12 NOTE — PLAN OF CARE
Problem: Falls - Risk of:  Goal: Will remain free from falls  Description: Will remain free from falls  8/12/2020 1735 by Brittany Mccartney RN  Outcome: Completed  8/12/2020 1105 by Joyce Soriano RN  Outcome: Ongoing  Goal: Absence of physical injury  Description: Absence of physical injury  8/12/2020 1735 by Brittany Mccartney RN  Outcome: Completed  8/12/2020 1105 by Joyce Soriano RN  Outcome: Ongoing     Problem: Skin Integrity:  Goal: Will show no infection signs and symptoms  Description: Will show no infection signs and symptoms  8/12/2020 1735 by Brittany Mccartney RN  Outcome: Completed  8/12/2020 1105 by Joyce Soriano RN  Outcome: Ongoing  Goal: Absence of new skin breakdown  Description: Absence of new skin breakdown  8/12/2020 1735 by Brittany Mccartney RN  Outcome: Completed  8/12/2020 1105 by Joyce Soriano RN  Outcome: Ongoing     Problem: Discharge Planning:  Goal: Discharged to appropriate level of care  Description: Discharged to appropriate level of care  8/12/2020 1735 by Brittany Mccartney RN  Outcome: Completed  8/12/2020 1105 by Joyce Soriano RN  Outcome: Ongoing     Problem: Fluid Volume - Imbalance:  Goal: Will remain free of signs and symptoms of dehydration  Description: Will remain free of signs and symptoms of dehydration  8/12/2020 1735 by Brittany Mccartney RN  Outcome: Completed  8/12/2020 1105 by Joyce Soriano RN  Outcome: Ongoing  Goal: Absence of imbalanced fluid volume signs and symptoms  Description: Absence of imbalanced fluid volume signs and symptoms  8/12/2020 1735 by Brittany Mccartney RN  Outcome: Completed  8/12/2020 1105 by Joyce Soriano RN  Outcome: Ongoing     Problem: Serum Glucose Level - Abnormal:  Goal: Ability to maintain appropriate glucose levels will improve  Description: Ability to maintain appropriate glucose levels will improve  8/12/2020 1735 by Brittany Mccartney RN  Outcome: Completed  8/12/2020 1105 by Joyce Soriano RN  Outcome: Ongoing     Problem: Injury - Acid Base Imbalance:  Goal: Acid-base balance  Description: Acid-base balance  8/12/2020 1735 by Quyen Brand RN  Outcome: Completed  8/12/2020 1105 by Beverly oWo RN  Outcome: Ongoing     Problem: Pain:  Goal: Pain level will decrease  Description: Pain level will decrease  8/12/2020 1735 by Quyen Brand RN  Outcome: Completed  8/12/2020 1105 by Beverly Woo RN  Outcome: Ongoing  Goal: Control of acute pain  Description: Control of acute pain  8/12/2020 1735 by Quyen Brand RN  Outcome: Completed  8/12/2020 1105 by Beverly Woo RN  Outcome: Ongoing  Goal: Control of chronic pain  Description: Control of chronic pain  8/12/2020 1735 by Quyen Brand RN  Outcome: Completed  8/12/2020 1105 by Beverly Woo RN  Outcome: Ongoing     Problem: Nutrition  Goal: Optimal nutrition therapy  Outcome: Completed

## 2020-08-12 NOTE — PROGRESS NOTES
Comprehensive Nutrition Assessment    Type and Reason for Visit:  Initial(LOS assessment)    Nutrition Recommendations/Plan:   1. Trial Glucerna BID  2. Encourage PO intake  3. Document all PO intake in flow sheets     Nutrition Assessment:  Pt is nutritionally compromised as evidenced by increased nutrient needs related to wound healing as pt is s/p excisional debridement of necrotizing L groin infection 8/7. PO intake has been poor for the majority of admission with pt consuming less than 25% of meals. However, this seems to be improving as pt consumed 51-75% of lunch today. Will add Glucerna BID to promote wound healing. Malnutrition Assessment:  Malnutrition Status: At risk for malnutrition (Comment)    Context:  Acute Illness       Estimated Daily Nutrient Needs:  Energy (kcal):  0817-6249; Weight Used for Energy Requirements:  Current(113 kg)     Protein (g):   grams; Weight Used for Protein Requirements:  Ideal(66 kg; 1.3-1.6 grams per kg)        Fluid (ml/day):  1 mL per kcal; Weight Used for Fluid Requirements:  Current      Nutrition Related Findings:  Non-pitting BUE/BLE edema. Wounds:  Surgical Wound       Current Nutrition Therapies:    DIET CARB CONTROL;     Anthropometric Measures:  · Height: 5' 9\" (175.3 cm)  · Current Body Weight: 250 lb (113.4 kg)   · Ideal Body Weight: 145 lbs  · BMI: 36.9  · BMI Categories: Obese Class 2 (BMI 35.0 -39.9)       Nutrition Diagnosis:   · Increased nutrient needs related to increase demand for energy/nutrients as evidenced by wounds      Nutrition Interventions:   Food and/or Nutrient Delivery:  Continue Current Diet, Start Oral Nutrition Supplement  Nutrition Education/Counseling:  Education not indicated   Coordination of Nutrition Care:  Continued Inpatient Monitoring    Goals:  Pt will consume at least 50% of meals and supplements       Nutrition Monitoring and Evaluation:   Food/Nutrient Intake Outcomes:  Food and Nutrient Intake, Supplement Intake  Physical Signs/Symptoms Outcomes:  Skin     Discharge Planning:    Continue Oral Nutrition Supplement, Continue current diet     Electronically signed by James Woods RD, LD on 8/12/20 at 1:12 PM EDT    Contact: [phone]

## 2020-08-12 NOTE — PROGRESS NOTES
[facility] and Laparoscopic [facility]        Progress Note    Patient Name: Reddy Arredondo  MRN: [id number]  YOB: 1950  Date of Evaluation: 2020    Subjective:  No acute events overnight  Pain controlled  Appetite good  No issues with wound but did have urine soil dressing  Resting in bed at this time      Post-Operative Day #5    Vital Signs:  Patient Vitals for the past 24 hrs:   BP Temp Temp src Pulse Resp SpO2   20 1301 131/72 98.2 °F (36.8 °C) Oral 70 16 96 %   20 0800 (!) 172/69 98.2 °F (36.8 °C) Oral 79 16 96 %   20 0457 (!) 144/83 98.1 °F (36.7 °C) Oral 72 16 95 %   20 0018 (!) 144/64 98.2 °F (36.8 °C) Oral 69 16 94 %   20 2058 (!) 148/80 98.2 °F (36.8 °C) Oral 73 16 96 %   20 1810 136/78 -- -- 69 -- --      TEMPERATURE HISTORY 24H: Temp (24hrs), Av.2 °F (36.8 °C), Min:98.1 °F (36.7 °C), Max:98.2 °F (36.8 °C)    BLOOD PRESSURE HISTORY: Systolic (67NMT), SJA:990 , Min:124 , EPM:617    Diastolic (34PRI), JWQ:43, Min:64, Max:83      Intake/Output:  I/O last 3 completed shifts: In: 240 [P.O.:240]  Out: 900 [Urine:900]  I/O this shift:  In: 10 [I.V.:10]  Out: -   Drain/tube Output:       Physical Exam:  General: awake, alert, oriented to  person, place, time  Lungs: unlabored respirations  Skin/Wound: wound bed open and generally clean with small amount purulent drainage medially, small amount of clear/serous fluid in wound bed--dressing changed    Labs:  CBC:    Recent Labs     08/10/20  0532 20  0646 20  0525   WBC 9.6 10.1 11.8*   HGB 8.7* 8.2* 8.4*   HCT 26.0* 24.9* 25.7*    371 390     BMP:    Recent Labs     08/10/20  0532 20  0646 20  0525   * 136 138   K 4.2 4.0 4.1    102 105   CO2 22 23 24   BUN 37* 30* 25*   CREATININE 1.2 1.2 1.0   GLUCOSE 158* 160* 119*     Hepatic:  No results for input(s): AST, ALT, ALB, BILITOT, ALKPHOS in the last 72 hours.   Amylase:  No results found for: AMYLASE  Lipase:    Lab Results   Component Value Date    LIPASE 25.0 08/07/2020      Mag:    Lab Results   Component Value Date    MG 1.90 08/12/2020    MG 2.00 08/11/2020     Phos:     Lab Results   Component Value Date    PHOS 3.3 08/12/2020    PHOS 3.2 08/11/2020      Coags:   Lab Results   Component Value Date    PROTIME 15.0 08/07/2020    INR 1.29 08/07/2020       Cultures:  Anaerobic culture  Lab Results   Component Value Date    LABANAE  08/07/2020     Anaerobic culture further report to follow  No anaerobes isolated so far, Further report to follow       Fungus stain  Results in Past 30 Days  Result Component Current Result Ref Range Previous Result Ref Range   Fungus Stain No Fungal elements seen (8/7/2020)  Not in Time Range      Gram stain  Results in Past 30 Days  Result Component Current Result Ref Range Previous Result Ref Range   Gram Stain Result No Epithelial Cells seen  3+ WBC's (Polymorphonuclear)  1+ Gram positive cocci  1+ Gram negative rods   (A) (8/10/2020)  CANCELED (8/7/2020)      Organism  Lab Results   Component Value Date/Time    ORG Escherichia coli (A) 08/10/2020 11:55 AM     Surgical culture  Lab Results   Component Value Date/Time    CXSURG (A) 08/07/2020 05:49 PM     Mixed skin patricio,  Light growth  No further workup      CXSURG Light growth 08/07/2020 05:49 PM     Blood culture 1  Results in Past 30 Days  Result Component Current Result Ref Range Previous Result Ref Range   Blood Culture, Routine No Growth after 4 days of incubation. (8/7/2020)  Not in Time Range      Blood culture 2  Results in Past 30 Days  Result Component Current Result Ref Range Previous Result Ref Range   Culture, Blood 2 No Growth after 4 days of incubation. (8/7/2020)  Not in Time Range      Fecal occult  No results found for requested labs within last 30 days. GI bacterial pathogens by PCR  No results found for requested labs within last 30 days.      C. difficile  No results found for requested labs within last 30 days. Urine culture  No results found for: LABURIN    Pathology:  No relevant pathology     Imaging:  I have personally reviewed the following films:    No results found. Scheduled Meds:   sodium chloride  1 g Oral BID WC    levoFLOXacin  500 mg Oral Daily    metroNIDAZOLE  500 mg Oral 3 times per day    aspirin  81 mg Oral Daily    metoclopramide  5 mg Oral 4x Daily    sertraline  50 mg Oral Daily    insulin glargine  30 Units Subcutaneous Daily    insulin lispro  10 Units Subcutaneous TID WC    melatonin  3 mg Oral Nightly    sodium chloride flush  10 mL Intravenous 2 times per day    famotidine  20 mg Oral Daily    enoxaparin  40 mg Subcutaneous Daily    metoprolol tartrate  50 mg Oral BID    insulin lispro  0-12 Units Subcutaneous TID WC    insulin lispro  0-6 Units Subcutaneous Nightly     Continuous Infusions:   dextrose       PRN Meds:.ondansetron, HYDROmorphone, HYDROcodone 5 mg - acetaminophen, sodium chloride flush, acetaminophen **OR** acetaminophen, polyethylene glycol, promethazine **OR** ondansetron, dextrose, glucose, dextrose, glucagon (rDNA), dextrose      Assessment:  78 y.o. female admitted with   1. Diabetic ketoacidosis without coma associated with type 2 diabetes mellitus ([facility]    2. General weakness    3. Severe sepsis ([facility]    4. Pneumonia due to organism    5. Suspected COVID-19 virus infection    6. Cellulitis of left groin        Status-post excisional debridement of skin, subcutaneous tissue and muscle with a total surface area of 345 cm2 on 8/7/2020 for necrotizing left groin infection  DKA  Lactic acidosis  Pneumonia  Hyponatremia  Hypotension  Acute kidney injury on CKD, stage 3  Anemia  Medical coagulapathy, on Brilinta (last dose on 8/7/2020)      Plan:  1. Local wound care with wet-to-dry dressings daily and PRN, follow cultures  2. Carb control diet as tolerated  3. Antibiotics  4.  Activity as tolerated, ambulate TID, up to chair for all meals--PT/OT following  5. PRN analgesics and antiemetics--minimizing narcotics as tolerated, transition to PO  6. DVT prophylaxis with Lovenox and SCD's  7. Management of medical comorbid etiologies per primary team and consulting services  8. Disposition: Discharge planning; okay for discharge once arrangements have been made, follow up with Dr. Lisha Boss in the [facility] weekly    EDUCATION:  Educated patient on plan of care and disease process--all questions answered. Plans discussed with patient and nursing. Reviewed and discussed with Dr. Lisha Boss.       Signed:  CHAUNCEY Morales - CNP  8/12/2020 2:19 PM

## 2020-08-12 NOTE — CARE COORDINATION
DISCHARGE SUMMARY     DATE OF DISCHARGE: 08/12/20    DISCHARGE DESTINATION: [facility]     FACILITY    Level of Care: Skilled    Report Number: [phone]    Fax Number:  [phone]    Precert Obtained: yes    Hens Completed: yes    PASARR: N/A    Notified: RN, Family and Facility/Agency-pt and facility aware of transport time.     Prescriptions Faxed:N/A    TRANSPORTATION: [place] [address] Name: [address] [place] [address] up Time: 5PM    Phone Number: [phone]    Electronically signed by EAN Simon on 8/12/2020 at 2:38 PM

## 2020-08-12 NOTE — PROGRESS NOTES
Morning assessment complete. Morning meds given. Unable to assess wound at this time d/t dressing placement. Dressing appears clean, dry, and intact. Purewick in place, draining to continuous wall suction. Patient in bed, eating breakfast. Bed alarm on, bed in lowest position, bedside table and call light within reach. No further needs expressed at this time.

## 2020-08-12 NOTE — DISCHARGE SUMMARY
[facility]    [facility] Demographics    Patient. Brook Dukes  Date of Birth. 1950  MRN. [id number]     Primary care provider. Christen Wilkins MD  (Tel: [phone])    Admit date: 8/7/2020    Discharge date (blank if same as Note Date): Note Date: 8/12/2020     Reason for Hospitalization. Chief Complaint   Patient presents with   [facility], Pt had fall yester day, lost ballance while putting food in refigerator. Significant Findings. Active Problems:    DKA, type 2, not at goal [facility])    Severe sepsis (HCC)    Lactic acidosis    Atelectasis    Necrotizing soft tissue infection    Cellulitis of left groin  Resolved Problems:    * No resolved hospital problems. *       Problems and results from this hospitalization that need follow up. 1. Hyponatremia  2. Necrotizing left groin abscess    Significant test results and incidental findings. 1.   CT FEMUR LEFT WO CONTRAST   Final Result   Soft tissue emphysema in the proximal medial thigh extending into the   anterior proximal thigh and laterally around the hip to the level of the   anterosuperior iliac spine. Findings could relate to the patient's clinical   ulcer or gas-forming soft tissue infection. No intramuscular gas in the thigh. No abscess or fluid collection. Findings were discussed with Dora LATHAM at 10:26 am on 8/7/2020. CT ABDOMEN PELVIS WO CONTRAST Additional Contrast? None   Final Result   1. Gas-forming cellulitis of the upper left thigh and groin   2. Chronic left hydronephrosis with renal atrophy, distal ureteral stricture   suspected   3. Left nephrolithiasis         CT CERVICAL SPINE WO CONTRAST   Final Result   No acute abnormality of the cervical spine. Moderate multilevel degenerative   disc disease worse between C5 and C7. CT HEAD WO CONTRAST   Final Result   No acute intracranial abnormality.          XR CHEST PORTABLE   Final Result   Small left-sided pleural effusion with overlying consolidation may be   secondary to atelectasis versus left lower lobe pneumonia. Invasive procedures and treatments. DATE OF PROCEDURE:  08/07/2020     PREOPERATIVE DIAGNOSIS:  Necrotizing left groin infection.     POSTOPERATIVE DIAGNOSIS:  Necrotizing left groin infection.     PROCEDURE:  Excisional debridement of skin, subcutaneous tissue and  muscle with a total surface area of 345 cm2. [facility]. 26-year-old female presented from nursing home with fever along with left groin abscess along with DKA and severe hyponatremia. Patient was admitted in the ICU. Patient was found to have necrotizing left groin infection patient underwent excisional debridement of skin, subcutaneous tissue and muscle with a total surface area of 345 cm2. Sodium did improve. Nephrology was consulted during the stay. I would recommend getting a BMP in a week to monitor on the sodium. Patient will be going to nursing home. DKA on admission was resolved  Patient did had mild elevation of CK but I am not sure whether it is rhabdomyolysis. DEVON on CKD improved. Coronary artery disease stable continue on  Home doses of meds. Consults. IP CONSULT TO HOSPITALIST  IP CONSULT TO GENERAL SURGERY  IP CONSULT TO SOCIAL WORK    Physical examination on discharge day. /72   Pulse 70   Temp 98.2 °F (36.8 °C) (Oral)   Resp 16   Ht 5' 9\" (1.753 m)   Wt 250 lb (113.4 kg)   SpO2 96%   BMI 36.92 kg/m²   General appearance. Alert. Looks comfortable. HEENT. Sclera clear. Moist mucus membranes. Cardiovascular. Regular rate and rhythm, normal S1, S2. No murmur. Respiratory. Not using accessory muscles. Clear to auscultation bilaterally, no wheeze. Gastrointestinal. Abdomen soft, non-tender, not distended, normal bowel sounds  Neurology. Facial symmetry. No speech deficits. Moving all extremities equally. Extremities.  No edema in lower extremities. Skin. Warm, dry, normal turgor        Condition at time of discharge stable    Medication instructions provided to patient at discharge. Medication List      START taking these medications    levoFLOXacin 500 MG tablet  Commonly known as:  LEVAQUIN  Take 1 tablet by mouth daily for 10 days  Start taking on:  August 13, 2020        CONTINUE taking these medications    aspirin 81 MG chewable tablet     atorvastatin 40 MG tablet  Commonly known as:  LIPITOR     fenofibrate 145 MG tablet  Commonly known as:  TRICOR     furosemide 40 MG tablet  Commonly known as:  LASIX     insulin regular 100 UNIT/ML injection  Commonly known as:  HUMULIN R;NOVOLIN R     isosorbide mononitrate 30 MG extended release tablet  Commonly known as:  IMDUR     Levemir 100 UNIT/ML injection vial  Generic drug:  insulin detemir     losartan 50 MG tablet  Commonly known as:  COZAAR     melatonin 3 MG Tabs tablet     metoclopramide 5 MG tablet  Commonly known as:  REGLAN     metoprolol tartrate 50 MG tablet  Commonly known as:  LOPRESSOR     OXcarbazepine 600 MG tablet  Commonly known as:  TRILEPTAL     oxybutynin 5 MG tablet  Commonly known as:  DITROPAN     sertraline 50 MG tablet  Commonly known as:  ZOLOFT     ticagrelor 90 MG Tabs tablet  Commonly known as:  BRILINTA     Vitamin D3 125 MCG (5000 UT) Tabs        STOP taking these medications    ibuprofen 400 MG tablet  Commonly known as:  ADVIL;MOTRIN           Where to Get Your Medications      These medications were sent to [facility], [address] [place] [address], [address] [place] [address] Road    Phone:  [phone]   · levoFLOXacin 500 MG tablet         Discharge recommendations given to patient. Follow Up. Primary care provider, general surgery  Disposition. SNF  Activity. activity as tolerated  Diet: DIET CARB CONTROL;   Dietary Nutrition Supplements: Diabetic Oral Supplement      Spent 40 minutes in discharge process.     Signed:  Maria Guadalupe Burns MD     8/12/2020 2:22 PM

## 2020-08-12 NOTE — PROGRESS NOTES
CLINICAL PHARMACY NOTE: MEDS TO [address] [facility] Patient?: No  Total # of Prescriptions Filled: 1   The following medications were delivered to the patient:  · Levofloxacin 500mg  Total # of Interventions Completed: 0  Time Spent (min): 30    Additional Documentation:  Medication delivered- patient signed  Denis Johns

## 2020-08-12 NOTE — PROGRESS NOTES
Report called to Sailaja Gold at [facility]. Tele to be removed. IV has been removed. Waiting on transport.

## 2020-08-13 LAB
ANAEROBIC CULTURE: ABNORMAL
CULTURE SURGICAL: ABNORMAL
CULTURE SURGICAL: ABNORMAL
GRAM STAIN RESULT: ABNORMAL
ORGANISM: ABNORMAL
ORGANISM: ABNORMAL

## 2020-08-18 ENCOUNTER — HOSPITAL ENCOUNTER (OUTPATIENT)
Dept: WOUND CARE | Age: 70
Discharge: HOME | End: 2020-08-18

## 2020-08-26 ENCOUNTER — HOSPITAL ENCOUNTER (OUTPATIENT)
Dept: WOUND CARE | Age: 70
Discharge: HOME | End: 2020-08-26
Payer: COMMERCIAL

## 2020-08-26 VITALS
RESPIRATION RATE: 18 BRPM | DIASTOLIC BLOOD PRESSURE: 54 MMHG | HEART RATE: 65 BPM | SYSTOLIC BLOOD PRESSURE: 107 MMHG | TEMPERATURE: 97 F

## 2020-08-26 PROBLEM — S81.802A OPEN WOUND OF LEFT LOWER LEG: Status: ACTIVE | Noted: 2020-08-26

## 2020-08-26 PROCEDURE — 99213 OFFICE O/P EST LOW 20 MIN: CPT

## 2020-08-26 PROCEDURE — 11045 DBRDMT SUBQ TISS EACH ADDL: CPT

## 2020-08-26 PROCEDURE — 11042 DBRDMT SUBQ TIS 1ST 20SQCM/<: CPT | Performed by: SURGERY

## 2020-08-26 PROCEDURE — 11042 DBRDMT SUBQ TIS 1ST 20SQCM/<: CPT

## 2020-08-26 PROCEDURE — 11045 DBRDMT SUBQ TISS EACH ADDL: CPT | Performed by: SURGERY

## 2020-08-26 RX ORDER — LIDOCAINE 50 MG/G
OINTMENT TOPICAL ONCE
Status: CANCELLED | OUTPATIENT
Start: 2020-08-26

## 2020-08-26 RX ORDER — LIDOCAINE HYDROCHLORIDE 40 MG/ML
SOLUTION TOPICAL ONCE
Status: CANCELLED | OUTPATIENT
Start: 2020-08-26

## 2020-08-26 RX ORDER — LIDOCAINE 40 MG/G
CREAM TOPICAL ONCE
Status: DISCONTINUED | OUTPATIENT
Start: 2020-08-26 | End: 2020-08-27 | Stop reason: HOSPADM

## 2020-08-26 RX ORDER — CLOBETASOL PROPIONATE 0.5 MG/G
OINTMENT TOPICAL ONCE
Status: CANCELLED | OUTPATIENT
Start: 2020-08-26

## 2020-08-26 RX ORDER — GENTAMICIN SULFATE 1 MG/G
OINTMENT TOPICAL ONCE
Status: CANCELLED | OUTPATIENT
Start: 2020-08-26

## 2020-08-26 RX ORDER — BACITRACIN ZINC AND POLYMYXIN B SULFATE 500; 1000 [USP'U]/G; [USP'U]/G
OINTMENT TOPICAL ONCE
Status: CANCELLED | OUTPATIENT
Start: 2020-08-26

## 2020-08-26 RX ORDER — GINSENG 100 MG
CAPSULE ORAL ONCE
Status: CANCELLED | OUTPATIENT
Start: 2020-08-26

## 2020-08-26 RX ORDER — BETAMETHASONE DIPROPIONATE 0.05 %
OINTMENT (GRAM) TOPICAL ONCE
Status: CANCELLED | OUTPATIENT
Start: 2020-08-26

## 2020-08-26 RX ORDER — BACITRACIN, NEOMYCIN, POLYMYXIN B 400; 3.5; 5 [USP'U]/G; MG/G; [USP'U]/G
OINTMENT TOPICAL ONCE
Status: CANCELLED | OUTPATIENT
Start: 2020-08-26

## 2020-08-26 RX ORDER — LIDOCAINE HYDROCHLORIDE 20 MG/ML
JELLY TOPICAL ONCE
Status: CANCELLED | OUTPATIENT
Start: 2020-08-26

## 2020-08-26 RX ORDER — LIDOCAINE 40 MG/G
CREAM TOPICAL ONCE
Status: CANCELLED | OUTPATIENT
Start: 2020-08-26

## 2020-08-26 ASSESSMENT — PAIN SCALES - GENERAL: PAINLEVEL_OUTOF10: 4

## 2020-08-26 ASSESSMENT — PAIN DESCRIPTION - ORIENTATION: ORIENTATION: LEFT

## 2020-08-26 ASSESSMENT — PAIN DESCRIPTION - LOCATION: LOCATION: GROIN

## 2020-08-26 NOTE — PROGRESS NOTES
[address], [address] [place] [address]  Telephone: [phone]          Other Tracy Fall Years F: [phone]    Dressing Change: Wound: Left groin      With each dressing change, rinse wounds with 0.9% Saline. (May use wound wash or soft contact solution. Both can be purchased at a local drug store). If unable to obtain saline, may use a gentle soap and water.     Dressing care: Moist to dry, dry dressing- change daily    Skilled nurse to evaluate and treat for wound care. Change dressing as ordered  once a day on Monday, Tuesday, Wednesday, Thursday, Friday, Saturday and Sunday using clean technique. Patient/Family/caregiver may change dressings as needed as instructed when Home Care unavailable.     WOUNDS REQUIRING DRESSING Changes:     Wound 08/26/20 Groin Left #1 (Active)   Wound Image   08/26/20 0955   Wound Non-Healing Surgical 08/26/20 0955   Wound Cleansed Rinsed/Irrigated with saline 08/26/20 1009   Wound Length (cm) 5 cm 08/26/20 0955   Wound Width (cm) 17 cm 08/26/20 0955   Wound Depth (cm) 3.8 cm 08/26/20 0955   Wound Surface Area (cm^2) 85 cm^2 08/26/20 0955   Wound Volume (cm^3) 323 cm^3 08/26/20 0955   Post-Procedure Length (cm) 5 cm 08/26/20 1009   Post-Procedure Width (cm) 17 cm 08/26/20 1009   Post-Procedure Depth (cm) 3.8 cm 08/26/20 1009   Post-Procedure Surface Area (cm^2) 85 cm^2 08/26/20 1009   Post-Procedure Volume (cm^3) 323 cm^3 08/26/20 1009   Wound Assessment Bleeding 08/26/20 1009   Drainage Amount Moderate 08/26/20 1009   Drainage Description Serosanguinous 08/26/20 0955   Odor None 08/26/20 0955   Denise-wound Assessment Clean;Dry 08/26/20 0955   Non-staged Wound Description Full thickness 08/26/20 0955   [place]%Wound Bed 0 08/26/20 0955   Red%Wound Bed 100 08/26/20 0955   Yellow%Wound Bed 0 08/26/20 0955   Black%Wound Bed 0 08/26/20 0955   Purple%Wound Bed 0 08/26/20 0955   Number of days: 0          Patient seen and treated on 8/26/20    By Georgette Calvo MD NPI: [id number] (provider/NPI)

## 2020-08-26 NOTE — PROGRESS NOTES
[address] Procedure Note    Tammie Gold  AGE: 78 y.o. GENDER: female    : 1950  TODAY'S DATE: 2020    Chief Complaint   Patient presents with    Wound Check     Left groin wound        History of Present Illness     Tammie Gold is a 78 y.o. female who presents today for wound evaluation. History of Wound: infectious wound located on the left groin. OR Date 20, sharp excisional debridement of skin, subcutaneous tissue and  muscle with a total surface area of 345 cm2. Wound Pain:  mild  Severity:  2 / 10   Wound Type:  infectious  Modifying Factors:  diabetes, poor glucose control, decreased mobility and obesity  Associated Signs/Symptoms:  none    Procedure Note:     Performed by: Anthony Calero MD    Consent obtained: Yes    Time out taken: Yes    Pain Control: Anesthetic  Anesthetic: 4% Lidocaine Cream     Debridement: Excisional Debridement    Using curette the wound was sharply debrided    down through and including the removal of epidermis, dermis and subcutaneous tissue.         Devitalized Tissue Debrided: fibrin, biofilm and slough    Pre Debridement Measurements:  Are located in the Wound Documentation Flow Sheet     Wound #: 1     Post  Debridement Measurements:  Wound 20 Groin Left #1 (Active)   Wound Image   20 0955   Wound Non-Healing Surgical 20 0955   Wound Cleansed Rinsed/Irrigated with saline 20 1009   Wound Length (cm) 5 cm 20 0955   Wound Width (cm) 17 cm 20 0955   Wound Depth (cm) 3.8 cm 20 0955   Wound Surface Area (cm^2) 85 cm^2 20 0955   Wound Volume (cm^3) 323 cm^3 20 0955   Post-Procedure Length (cm) 5 cm 20 1009   Post-Procedure Width (cm) 17 cm 20 1009   Post-Procedure Depth (cm) 3.8 cm 20 1009   Post-Procedure Surface Area (cm^2) 85 cm^2 20 1009   Post-Procedure Volume (cm^3) 323 cm^3 20 1009   Wound Assessment Bleeding 20 1009   Drainage Amount Moderate 08/26/20 1009   Drainage Description Serosanguinous 08/26/20 0955   Odor None 08/26/20 0955   Denise-wound Assessment Clean;Dry 08/26/20 0955   Non-staged Wound Description Full thickness 08/26/20 0955   [place]%Wound Bed 0 08/26/20 0955   Red%Wound Bed 100 08/26/20 0955   Yellow%Wound Bed 0 08/26/20 0955   Black%Wound Bed 0 08/26/20 0955   Purple%Wound Bed 0 08/26/20 0955   Number of days: 0       Total Surface Area Debrided:  85 sq cm     Bleeding:  Minimal    Hemostasis Achieved:  by pressure    Procedural Pain:  2  / 10     Post Procedural Pain:  2 / 10     Response to treatment:  Well tolerated by patient. Assessment:     Infectious left groin wound    Patient tolerated procedure well and was given proper instruction. The nature of the patient's condition was explained in depth. The patient was informed that their compliance to the treatment plan is paramount to successful healing and prevention of further ulceration and/or infection       Plan:     Treatment Plan: With each dressing change, rinse wounds with 0.9% Saline. (May use wound wash or soft contact solution. Both can be purchased at a local drug store). If unable to obtain saline, may use a gentle soap and water. Dressing care: Moist to dry, dry dressing- change daily    Sudhakar Barrios MD, FACS  8/26/2020  10:16 AM

## 2020-09-02 ENCOUNTER — HOSPITAL ENCOUNTER (OUTPATIENT)
Dept: WOUND CARE | Age: 70
Discharge: HOME | End: 2020-09-02
Payer: COMMERCIAL

## 2020-09-02 PROCEDURE — 11042 DBRDMT SUBQ TIS 1ST 20SQCM/<: CPT | Performed by: SURGERY

## 2020-09-02 PROCEDURE — 11042 DBRDMT SUBQ TIS 1ST 20SQCM/<: CPT

## 2020-09-02 PROCEDURE — 11045 DBRDMT SUBQ TISS EACH ADDL: CPT

## 2020-09-02 PROCEDURE — 11045 DBRDMT SUBQ TISS EACH ADDL: CPT | Performed by: SURGERY

## 2020-09-02 RX ORDER — GENTAMICIN SULFATE 1 MG/G
OINTMENT TOPICAL ONCE
Status: CANCELLED | OUTPATIENT
Start: 2020-09-02

## 2020-09-02 RX ORDER — LIDOCAINE 40 MG/G
CREAM TOPICAL ONCE
Status: DISCONTINUED | OUTPATIENT
Start: 2020-09-02 | End: 2020-09-03 | Stop reason: HOSPADM

## 2020-09-02 RX ORDER — BETAMETHASONE DIPROPIONATE 0.05 %
OINTMENT (GRAM) TOPICAL ONCE
Status: CANCELLED | OUTPATIENT
Start: 2020-09-02

## 2020-09-02 RX ORDER — LIDOCAINE 40 MG/G
CREAM TOPICAL ONCE
Status: CANCELLED | OUTPATIENT
Start: 2020-09-02

## 2020-09-02 RX ORDER — CLOBETASOL PROPIONATE 0.5 MG/G
OINTMENT TOPICAL ONCE
Status: CANCELLED | OUTPATIENT
Start: 2020-09-02

## 2020-09-02 RX ORDER — BACITRACIN, NEOMYCIN, POLYMYXIN B 400; 3.5; 5 [USP'U]/G; MG/G; [USP'U]/G
OINTMENT TOPICAL ONCE
Status: CANCELLED | OUTPATIENT
Start: 2020-09-02

## 2020-09-02 RX ORDER — GINSENG 100 MG
CAPSULE ORAL ONCE
Status: CANCELLED | OUTPATIENT
Start: 2020-09-02

## 2020-09-02 RX ORDER — LIDOCAINE HYDROCHLORIDE 20 MG/ML
JELLY TOPICAL ONCE
Status: CANCELLED | OUTPATIENT
Start: 2020-09-02

## 2020-09-02 RX ORDER — LIDOCAINE 50 MG/G
OINTMENT TOPICAL ONCE
Status: CANCELLED | OUTPATIENT
Start: 2020-09-02

## 2020-09-02 RX ORDER — LIDOCAINE HYDROCHLORIDE 40 MG/ML
SOLUTION TOPICAL ONCE
Status: CANCELLED | OUTPATIENT
Start: 2020-09-02

## 2020-09-02 RX ORDER — BACITRACIN ZINC AND POLYMYXIN B SULFATE 500; 1000 [USP'U]/G; [USP'U]/G
OINTMENT TOPICAL ONCE
Status: CANCELLED | OUTPATIENT
Start: 2020-09-02

## 2020-09-02 NOTE — PROGRESS NOTES
[address] Procedure Note    Trevor Cobos  AGE: 78 y.o. GENDER: female    : 1950  TODAY'S DATE: 2020    Chief Complaint   Patient presents with    Wound Check     Left Inner Thigh  F/U        History of Present Illness     Trevor Cobos is a 78 y.o. female who presents today for wound evaluation. History of Wound: infectious wound located on the left groin. Wound Pain:  mild  Severity:  2 / 10   Wound Type:  infectious  Modifying Factors:  diabetes, poor glucose control, decreased mobility and obesity  Associated Signs/Symptoms:  none    Procedure Note:     Performed by: Heidy Gimenez MD    Consent obtained: Yes    Time out taken: Yes    Pain Control: Anesthetic  Anesthetic: 4% Lidocaine Cream     Debridement: Excisional Debridement    Using curette the wound was sharply debrided    down through and including the removal of epidermis, dermis and subcutaneous tissue.         Devitalized Tissue Debrided: fibrin, biofilm and slough    Pre Debridement Measurements:  Are located in the Wound Documentation Flow Sheet     Wound #: 1     Post  Debridement Measurements:  Wound 20 Groin Left #1 (Active)   Wound Image   20 0955   Wound Non-Healing Surgical 20 0920   Wound Cleansed Rinsed/Irrigated with saline 20 0943   Wound Length (cm) 5 cm 20 0920   Wound Width (cm) 15.8 cm 20 0920   Wound Depth (cm) 2.3 cm 20 0920   Wound Surface Area (cm^2) 79 cm^2 20 0920   Change in Wound Size % (l*w) 7.06 20 0920   Wound Volume (cm^3) 181.7 cm^3 20 0920   Wound Healing % 44 20 0920   Post-Procedure Length (cm) 5 cm 20 0943   Post-Procedure Width (cm) 15.8 cm 20 0943   Post-Procedure Depth (cm) 2.3 cm 20 0943   Post-Procedure Surface Area (cm^2) 79 cm^2 20 0943   Post-Procedure Volume (cm^3) 181.7 cm^3 20 0943   Wound Assessment Bleeding 2043   Drainage Amount Moderate 20 1661   Drainage Description Serosanguinous 09/02/20 0920   Odor None 09/02/20 0920   Denise-wound Assessment Dry 09/02/20 0920   Non-staged Wound Description Full thickness 08/26/20 0955   [place]%Wound Bed 0 09/02/20 0920   Red%Wound Bed 100 09/02/20 0920   Yellow%Wound Bed 0 09/02/20 0920   Black%Wound Bed 0 09/02/20 0920   Purple%Wound Bed 0 09/02/20 0920   Other%Wound Bed 0 09/02/20 0920   Number of days: 7       Total Surface Area Debrided:  79 sq cm     Bleeding:  Minimal    Hemostasis Achieved:  by pressure    Procedural Pain:  2  / 10     Post Procedural Pain:  2 / 10     Response to treatment:  Well tolerated by patient. Assessment:     Wound looks improved. (improved, worse or stable)    Patient tolerated procedure well and was given proper instruction. The nature of the patient's condition was explained in depth. The patient was informed that their compliance to the treatment plan is paramount to successful healing and prevention of further ulceration and/or infection       Plan:     Treatment Plan: With each dressing change, rinse wounds with 0.9% Saline. (May use wound wash or soft contact solution. Both can be purchased at a local drug store). If unable to obtain saline, may use a gentle soap and water. Dressing care: Moist to dry, dry dressing- change daily    Sudhakar Borges MD, FACS  9/2/2020  2:10 PM

## 2020-09-07 LAB
FUNGUS (MYCOLOGY) CULTURE: NORMAL
FUNGUS STAIN: NORMAL

## 2020-09-09 ENCOUNTER — HOSPITAL ENCOUNTER (OUTPATIENT)
Dept: WOUND CARE | Age: 70
Discharge: HOME | End: 2020-09-09

## 2020-09-16 ENCOUNTER — HOSPITAL ENCOUNTER (OUTPATIENT)
Dept: WOUND CARE | Age: 70
Discharge: HOME | End: 2020-09-16
Payer: COMMERCIAL

## 2020-09-16 VITALS — WEIGHT: 237 LBS | BODY MASS INDEX: 35.1 KG/M2 | HEIGHT: 69 IN

## 2020-09-16 PROCEDURE — 11042 DBRDMT SUBQ TIS 1ST 20SQCM/<: CPT | Performed by: SURGERY

## 2020-09-16 PROCEDURE — 11042 DBRDMT SUBQ TIS 1ST 20SQCM/<: CPT

## 2020-09-16 PROCEDURE — 11045 DBRDMT SUBQ TISS EACH ADDL: CPT | Performed by: SURGERY

## 2020-09-16 PROCEDURE — 11045 DBRDMT SUBQ TISS EACH ADDL: CPT

## 2020-09-16 RX ORDER — LIDOCAINE 40 MG/G
CREAM TOPICAL ONCE
Status: DISCONTINUED | OUTPATIENT
Start: 2020-09-16 | End: 2020-09-17 | Stop reason: HOSPADM

## 2020-09-16 RX ORDER — LIDOCAINE 40 MG/G
CREAM TOPICAL ONCE
Status: CANCELLED | OUTPATIENT
Start: 2020-09-16

## 2020-09-16 RX ORDER — LIDOCAINE 50 MG/G
OINTMENT TOPICAL ONCE
Status: CANCELLED | OUTPATIENT
Start: 2020-09-16

## 2020-09-16 RX ORDER — CLOBETASOL PROPIONATE 0.5 MG/G
OINTMENT TOPICAL ONCE
Status: CANCELLED | OUTPATIENT
Start: 2020-09-16

## 2020-09-16 RX ORDER — BACITRACIN, NEOMYCIN, POLYMYXIN B 400; 3.5; 5 [USP'U]/G; MG/G; [USP'U]/G
OINTMENT TOPICAL ONCE
Status: CANCELLED | OUTPATIENT
Start: 2020-09-16

## 2020-09-16 RX ORDER — GINSENG 100 MG
CAPSULE ORAL ONCE
Status: CANCELLED | OUTPATIENT
Start: 2020-09-16

## 2020-09-16 RX ORDER — BETAMETHASONE DIPROPIONATE 0.05 %
OINTMENT (GRAM) TOPICAL ONCE
Status: CANCELLED | OUTPATIENT
Start: 2020-09-16

## 2020-09-16 RX ORDER — LIDOCAINE HYDROCHLORIDE 40 MG/ML
SOLUTION TOPICAL ONCE
Status: CANCELLED | OUTPATIENT
Start: 2020-09-16

## 2020-09-16 RX ORDER — BACITRACIN ZINC AND POLYMYXIN B SULFATE 500; 1000 [USP'U]/G; [USP'U]/G
OINTMENT TOPICAL ONCE
Status: CANCELLED | OUTPATIENT
Start: 2020-09-16

## 2020-09-16 RX ORDER — LIDOCAINE HYDROCHLORIDE 20 MG/ML
JELLY TOPICAL ONCE
Status: CANCELLED | OUTPATIENT
Start: 2020-09-16

## 2020-09-16 RX ORDER — GENTAMICIN SULFATE 1 MG/G
OINTMENT TOPICAL ONCE
Status: CANCELLED | OUTPATIENT
Start: 2020-09-16

## 2020-09-16 NOTE — PROGRESS NOTES
[address] Procedure Note    Mich Drake  AGE: 78 y.o. GENDER: female    : 1950  TODAY'S DATE: 2020    Chief Complaint   Patient presents with    Wound Check     left groin F/U        History of Present Illness     Mich Drake is a 78 y.o. female who presents today for wound evaluation. History of Wound: infectious wound located on the left groin. Wound Pain:  mild  Severity:  1 / 10   Wound Type:  infectious  Modifying Factors:  diabetes, poor glucose control, decreased mobility and obesity  Associated Signs/Symptoms:  none    Procedure Note:     Performed by: Jay Meyers MD    Consent obtained: Yes    Time out taken: Yes    Pain Control: Anesthetic  Anesthetic: 4% Lidocaine Cream     Debridement: Excisional Debridement    Using curette the wound was sharply debrided    down through and including the removal of epidermis, dermis and subcutaneous tissue.         Devitalized Tissue Debrided: fibrin, biofilm and slough    Pre Debridement Measurements:  Are located in the Wound Documentation Flow Sheet     Wound #: 1     Post  Debridement Measurements:  Wound 20 Groin Left #1 (Active)   Wound Image   20 0955   Wound Non-Healing Surgical 20 0852   Wound Cleansed Rinsed/Irrigated with saline 20 09   Wound Length (cm) 6 cm 20 08   Wound Width (cm) 15 cm 20 08   Wound Depth (cm) 0.8 cm 20 08   Wound Surface Area (cm^2) 90 cm^2 20 0852   Change in Wound Size % (l*w) -5.88 20 08   Wound Volume (cm^3) 72 cm^3 20 0852   Wound Healing % 78 20 0852   Post-Procedure Length (cm) 6 cm 20 09   Post-Procedure Width (cm) 15 cm 20   Post-Procedure Depth (cm) 0.8 cm 20   Post-Procedure Surface Area (cm^2) 90 cm^2 20 0905   Post-Procedure Volume (cm^3) 72 cm^3 20 0905   Wound Assessment Bleeding 20   Drainage Amount Moderate 20   Drainage

## 2020-09-16 NOTE — PLAN OF CARE
Discharge instructions given. Patient verbalized understanding. Return to [address] [place] [address],[address] in 1 week.

## 2020-09-22 LAB
AFB CULTURE (MYCOBACTERIA): NORMAL
AFB SMEAR: NORMAL

## 2020-09-23 ENCOUNTER — HOSPITAL ENCOUNTER (OUTPATIENT)
Dept: WOUND CARE | Age: 70
Discharge: HOME | End: 2020-09-23
Payer: COMMERCIAL

## 2020-09-23 VITALS
WEIGHT: 237 LBS | DIASTOLIC BLOOD PRESSURE: 61 MMHG | TEMPERATURE: 97.9 F | SYSTOLIC BLOOD PRESSURE: 128 MMHG | HEART RATE: 74 BPM | BODY MASS INDEX: 35 KG/M2

## 2020-09-23 PROCEDURE — 11042 DBRDMT SUBQ TIS 1ST 20SQCM/<: CPT | Performed by: SURGERY

## 2020-09-23 PROCEDURE — 11045 DBRDMT SUBQ TISS EACH ADDL: CPT | Performed by: SURGERY

## 2020-09-23 PROCEDURE — 11042 DBRDMT SUBQ TIS 1ST 20SQCM/<: CPT

## 2020-09-23 PROCEDURE — 11045 DBRDMT SUBQ TISS EACH ADDL: CPT

## 2020-09-23 RX ORDER — LIDOCAINE 40 MG/G
CREAM TOPICAL ONCE
Status: DISCONTINUED | OUTPATIENT
Start: 2020-09-23 | End: 2020-09-24 | Stop reason: HOSPADM

## 2020-09-23 RX ORDER — LIDOCAINE 50 MG/G
OINTMENT TOPICAL ONCE
Status: CANCELLED | OUTPATIENT
Start: 2020-09-23

## 2020-09-23 RX ORDER — GENTAMICIN SULFATE 1 MG/G
OINTMENT TOPICAL ONCE
Status: CANCELLED | OUTPATIENT
Start: 2020-09-23

## 2020-09-23 RX ORDER — BACITRACIN ZINC AND POLYMYXIN B SULFATE 500; 1000 [USP'U]/G; [USP'U]/G
OINTMENT TOPICAL ONCE
Status: CANCELLED | OUTPATIENT
Start: 2020-09-23

## 2020-09-23 RX ORDER — LIDOCAINE 40 MG/G
CREAM TOPICAL ONCE
Status: CANCELLED | OUTPATIENT
Start: 2020-09-23

## 2020-09-23 RX ORDER — BACITRACIN, NEOMYCIN, POLYMYXIN B 400; 3.5; 5 [USP'U]/G; MG/G; [USP'U]/G
OINTMENT TOPICAL ONCE
Status: CANCELLED | OUTPATIENT
Start: 2020-09-23

## 2020-09-23 RX ORDER — BETAMETHASONE DIPROPIONATE 0.05 %
OINTMENT (GRAM) TOPICAL ONCE
Status: CANCELLED | OUTPATIENT
Start: 2020-09-23

## 2020-09-23 RX ORDER — LIDOCAINE HYDROCHLORIDE 40 MG/ML
SOLUTION TOPICAL ONCE
Status: CANCELLED | OUTPATIENT
Start: 2020-09-23

## 2020-09-23 RX ORDER — CLOBETASOL PROPIONATE 0.5 MG/G
OINTMENT TOPICAL ONCE
Status: CANCELLED | OUTPATIENT
Start: 2020-09-23

## 2020-09-23 RX ORDER — GINSENG 100 MG
CAPSULE ORAL ONCE
Status: CANCELLED | OUTPATIENT
Start: 2020-09-23

## 2020-09-23 RX ORDER — LIDOCAINE HYDROCHLORIDE 20 MG/ML
JELLY TOPICAL ONCE
Status: CANCELLED | OUTPATIENT
Start: 2020-09-23

## 2020-09-23 NOTE — PROGRESS NOTES
[address] Procedure Note    Reddy Arredondo  AGE: 78 y.o. GENDER: female    : 1950  TODAY'S DATE: 2020    Chief Complaint   Patient presents with    Wound Check     left groin F/U        History of Present Illness     Reddy Arredondo is a 78 y.o. female who presents today for wound evaluation. History of Wound: infectious wound located on the left groin. Wound Pain:  mild  Severity:  1 / 10   Wound Type:  infectious  Modifying Factors:  diabetes, poor glucose control, decreased mobility and obesity  Associated Signs/Symptoms:  none    Procedure Note:     Performed by: Geo Mccabe MD    Consent obtained: Yes    Time out taken: Yes    Pain Control: Anesthetic  Anesthetic: 4% Lidocaine Cream     Debridement: Excisional Debridement    Using curette the wound was sharply debrided    down through and including the removal of epidermis, dermis and subcutaneous tissue.         Devitalized Tissue Debrided: fibrin, biofilm and slough    Pre Debridement Measurements:  Are located in the Wound Documentation Flow Sheet     Wound #: 1     Post  Debridement Measurements:  Wound 20 Groin Left #1 (Active)   Wound Image   20 0955   Wound Non-Healing Surgical 20 0851   Wound Cleansed Rinsed/Irrigated with saline 20 09   Wound Length (cm) 5.5 cm 20 0851   Wound Width (cm) 15 cm 20 0851   Wound Depth (cm) 0.7 cm 20 08   Wound Surface Area (cm^2) 82.5 cm^2 20 0851   Change in Wound Size % (l*w) 2.94 20 08   Wound Volume (cm^3) 57.75 cm^3 20 0851   Wound Healing % 82 20 0851   Post-Procedure Length (cm) 5.5 cm 20 09   Post-Procedure Width (cm) 15 cm 20 09   Post-Procedure Depth (cm) 0.7 cm 20 09   Post-Procedure Surface Area (cm^2) 82.5 cm^2 20 09   Post-Procedure Volume (cm^3) 57.75 cm^3 20 0901   Wound Assessment Bleeding 20 09   Drainage Amount Moderate 20 09

## 2020-09-30 ENCOUNTER — HOSPITAL ENCOUNTER (OUTPATIENT)
Dept: WOUND CARE | Age: 70
Discharge: HOME | End: 2020-09-30
Payer: COMMERCIAL

## 2020-09-30 VITALS
SYSTOLIC BLOOD PRESSURE: 137 MMHG | RESPIRATION RATE: 18 BRPM | HEART RATE: 79 BPM | TEMPERATURE: 97 F | DIASTOLIC BLOOD PRESSURE: 77 MMHG

## 2020-09-30 PROCEDURE — 11042 DBRDMT SUBQ TIS 1ST 20SQCM/<: CPT

## 2020-09-30 PROCEDURE — 11042 DBRDMT SUBQ TIS 1ST 20SQCM/<: CPT | Performed by: SURGERY

## 2020-09-30 PROCEDURE — 11045 DBRDMT SUBQ TISS EACH ADDL: CPT | Performed by: SURGERY

## 2020-09-30 PROCEDURE — 11045 DBRDMT SUBQ TISS EACH ADDL: CPT

## 2020-09-30 RX ORDER — LIDOCAINE 50 MG/G
OINTMENT TOPICAL ONCE
Status: CANCELLED | OUTPATIENT
Start: 2020-09-30

## 2020-09-30 RX ORDER — GENTAMICIN SULFATE 1 MG/G
OINTMENT TOPICAL ONCE
Status: CANCELLED | OUTPATIENT
Start: 2020-09-30

## 2020-09-30 RX ORDER — LIDOCAINE HYDROCHLORIDE 20 MG/ML
JELLY TOPICAL ONCE
Status: CANCELLED | OUTPATIENT
Start: 2020-09-30

## 2020-09-30 RX ORDER — GINSENG 100 MG
CAPSULE ORAL ONCE
Status: CANCELLED | OUTPATIENT
Start: 2020-09-30

## 2020-09-30 RX ORDER — BACITRACIN ZINC AND POLYMYXIN B SULFATE 500; 1000 [USP'U]/G; [USP'U]/G
OINTMENT TOPICAL ONCE
Status: CANCELLED | OUTPATIENT
Start: 2020-09-30

## 2020-09-30 RX ORDER — LIDOCAINE 40 MG/G
CREAM TOPICAL ONCE
Status: CANCELLED | OUTPATIENT
Start: 2020-09-30

## 2020-09-30 RX ORDER — BETAMETHASONE DIPROPIONATE 0.05 %
OINTMENT (GRAM) TOPICAL ONCE
Status: CANCELLED | OUTPATIENT
Start: 2020-09-30

## 2020-09-30 RX ORDER — LIDOCAINE 40 MG/G
CREAM TOPICAL ONCE
Status: DISCONTINUED | OUTPATIENT
Start: 2020-09-30 | End: 2020-10-01 | Stop reason: HOSPADM

## 2020-09-30 RX ORDER — CLOBETASOL PROPIONATE 0.5 MG/G
OINTMENT TOPICAL ONCE
Status: CANCELLED | OUTPATIENT
Start: 2020-09-30

## 2020-09-30 RX ORDER — LIDOCAINE HYDROCHLORIDE 40 MG/ML
SOLUTION TOPICAL ONCE
Status: CANCELLED | OUTPATIENT
Start: 2020-09-30

## 2020-09-30 RX ORDER — BACITRACIN, NEOMYCIN, POLYMYXIN B 400; 3.5; 5 [USP'U]/G; MG/G; [USP'U]/G
OINTMENT TOPICAL ONCE
Status: CANCELLED | OUTPATIENT
Start: 2020-09-30

## 2020-09-30 NOTE — PROGRESS NOTES
[address] Procedure Note    Evy Langford  AGE: 78 y.o. GENDER: female    : 1950  TODAY'S DATE: 2020    Chief Complaint   Patient presents with    Wound Check     left groin        History of Present Illness     Evy Langford is a 78 y.o. female who presents today for wound evaluation. History of Wound: infectious wound located on the left groin. Wound Pain:  mild  Severity:  1 / 10   Wound Type:  infectious  Modifying Factors:  diabetes, poor glucose control, decreased mobility and obesity  Associated Signs/Symptoms:  none    Procedure Note:     Performed by: Seun Wing MD    Consent obtained: Yes    Time out taken: Yes    Pain Control: Anesthetic  Anesthetic: 4% Lidocaine Cream     Debridement: Excisional Debridement    Using curette the wound was sharply debrided    down through and including the removal of epidermis, dermis and subcutaneous tissue.         Devitalized Tissue Debrided: fibrin, biofilm and slough    Pre Debridement Measurements:  Are located in the Wound Documentation Flow Sheet     Wound #: 1     Post  Debridement Measurements:  Wound 20 Groin Left #1 (Active)   Wound Image   20 0955   Wound Non-Healing Surgical 20 09   Wound Cleansed Rinsed/Irrigated with saline 2030   Wound Length (cm) 5.3 cm 20   Wound Width (cm) 17 cm 20 09   Wound Depth (cm) 0.7 cm 20 0904   Wound Surface Area (cm^2) 90.1 cm^2 20 0904   Change in Wound Size % (l*w) -6 20 09   Wound Volume (cm^3) 63.07 cm^3 20 0904   Wound Healing % 80 20 0904   Post-Procedure Length (cm) 5.3 cm 2030   Post-Procedure Width (cm) 17 cm 2030   Post-Procedure Depth (cm) 0.7 cm 2030   Post-Procedure Surface Area (cm^2) 90.1 cm^2 2030   Post-Procedure Volume (cm^3) 63.07 cm^3 20 0930   Wound Assessment Bleeding 20   Drainage Amount Moderate 20 Drainage Description Serosanguinous 09/30/20 0904   Odor None 09/30/20 0904   Denise-wound Assessment Dry 09/30/20 0904   Non-staged Wound Description Full thickness 09/30/20 0904   [place]%Wound Bed 0 09/30/20 0904   Red%Wound Bed 100 09/30/20 0904   Yellow%Wound Bed 0 09/30/20 0904   Black%Wound Bed 0 09/30/20 0904   Purple%Wound Bed 0 09/30/20 0904   Other%Wound Bed 0 09/30/20 0904   Number of days: 35       Total Surface Area Debrided:  90.1 sq cm     Bleeding:  Minimal    Hemostasis Achieved:  by pressure    Procedural Pain:  1  / 10     Post Procedural Pain:  1 / 10     Response to treatment:  Well tolerated by patient. Assessment:     Wound looks improved. (improved, worse or stable)    Patient tolerated procedure well and was given proper instruction. The nature of the patient's condition was explained in depth. The patient was informed that their compliance to the treatment plan is paramount to successful healing and prevention of further ulceration and/or infection       Plan:     Treatment Plan: With each dressing change, rinse wounds with 0.9% Saline. (May use wound wash or soft contact solution. Both can be purchased at a local drug store). If unable to obtain saline, may use a gentle soap and water.     Dressing care: Moist to dry, dry dressing- change daily       Sudhakar Smith MD, FACS  9/30/2020  10:31 AM

## 2020-09-30 NOTE — PLAN OF CARE
Discharge instructions given. Patient verbalized understanding. Return to [facility] in 1 week.

## 2020-10-07 ENCOUNTER — HOSPITAL ENCOUNTER (OUTPATIENT)
Dept: WOUND CARE | Age: 70
Discharge: HOME | End: 2020-10-07
Payer: COMMERCIAL

## 2020-10-14 ENCOUNTER — HOSPITAL ENCOUNTER (OUTPATIENT)
Dept: WOUND CARE | Age: 70
Discharge: HOME | End: 2020-10-14
Payer: COMMERCIAL

## 2020-10-14 VITALS
HEART RATE: 73 BPM | RESPIRATION RATE: 16 BRPM | DIASTOLIC BLOOD PRESSURE: 59 MMHG | TEMPERATURE: 97 F | SYSTOLIC BLOOD PRESSURE: 116 MMHG

## 2020-10-14 PROCEDURE — 11042 DBRDMT SUBQ TIS 1ST 20SQCM/<: CPT

## 2020-10-14 PROCEDURE — 11042 DBRDMT SUBQ TIS 1ST 20SQCM/<: CPT | Performed by: SURGERY

## 2020-10-14 RX ORDER — LIDOCAINE HYDROCHLORIDE 40 MG/ML
SOLUTION TOPICAL ONCE
Status: CANCELLED | OUTPATIENT
Start: 2020-10-14

## 2020-10-14 RX ORDER — GENTAMICIN SULFATE 1 MG/G
OINTMENT TOPICAL ONCE
Status: CANCELLED | OUTPATIENT
Start: 2020-10-14

## 2020-10-14 RX ORDER — LIDOCAINE HYDROCHLORIDE 20 MG/ML
JELLY TOPICAL ONCE
Status: CANCELLED | OUTPATIENT
Start: 2020-10-14

## 2020-10-14 RX ORDER — CLOBETASOL PROPIONATE 0.5 MG/G
OINTMENT TOPICAL ONCE
Status: CANCELLED | OUTPATIENT
Start: 2020-10-14

## 2020-10-14 RX ORDER — BETAMETHASONE DIPROPIONATE 0.05 %
OINTMENT (GRAM) TOPICAL ONCE
Status: CANCELLED | OUTPATIENT
Start: 2020-10-14

## 2020-10-14 RX ORDER — LIDOCAINE 40 MG/G
CREAM TOPICAL ONCE
Status: CANCELLED | OUTPATIENT
Start: 2020-10-14

## 2020-10-14 RX ORDER — LIDOCAINE 40 MG/G
CREAM TOPICAL ONCE
Status: DISCONTINUED | OUTPATIENT
Start: 2020-10-14 | End: 2020-10-15 | Stop reason: HOSPADM

## 2020-10-14 RX ORDER — GINSENG 100 MG
CAPSULE ORAL ONCE
Status: CANCELLED | OUTPATIENT
Start: 2020-10-14

## 2020-10-14 RX ORDER — LIDOCAINE 50 MG/G
OINTMENT TOPICAL ONCE
Status: CANCELLED | OUTPATIENT
Start: 2020-10-14

## 2020-10-14 RX ORDER — BACITRACIN ZINC AND POLYMYXIN B SULFATE 500; 1000 [USP'U]/G; [USP'U]/G
OINTMENT TOPICAL ONCE
Status: CANCELLED | OUTPATIENT
Start: 2020-10-14

## 2020-10-14 RX ORDER — BACITRACIN, NEOMYCIN, POLYMYXIN B 400; 3.5; 5 [USP'U]/G; MG/G; [USP'U]/G
OINTMENT TOPICAL ONCE
Status: CANCELLED | OUTPATIENT
Start: 2020-10-14

## 2020-10-14 NOTE — PROGRESS NOTES
[address] Procedure Note    Edwin Bejarano  AGE: 69 y.o. GENDER: female    : 1950  TODAY'S DATE: 10/14/2020    Chief Complaint   Patient presents with    Wound Check     Groin        History of Present Illness     Edwin Bejarano is a 69 y.o. female who presents today for wound evaluation. History of Wound: infectious wound located on the left groin. Wound Pain:  mild  Severity:  1 / 10   Wound Type:  infectious  Modifying Factors:  diabetes, poor glucose control, decreased mobility and obesity  Associated Signs/Symptoms:  none    Procedure Note:     Performed by: Ramona Rivera MD    Consent obtained: Yes    Time out taken: Yes    Pain Control: Anesthetic  Anesthetic: 4% Lidocaine Cream     Debridement: Excisional Debridement    Using curette the wound was sharply debrided    down through and including the removal of epidermis, dermis and subcutaneous tissue.         Devitalized Tissue Debrided: fibrin, biofilm and slough    Pre Debridement Measurements:  Are located in the Wound Documentation Flow Sheet     Wound #: 1     Post  Debridement Measurements:  Wound 20 Groin Left #1 (Active)   Wound Image   20 0955   Wound Etiology Non-Healing Surgical 10/14/20 0949   Wound Cleansed Cleansed with saline 10/14/20 100   Dressing/Treatment Moist to dry;Dry dressing 10/14/20 1022   Wound Length (cm) 1.8 cm 10/14/20 0949   Wound Width (cm) 9.5 cm 10/14/20 0949   Wound Depth (cm) 0.2 cm 10/14/20 0949   Wound Surface Area (cm^2) 17.1 cm^2 10/14/20 0949   Change in Wound Size % (l*w) 79.88 10/14/20 0949   Wound Volume (cm^3) 3.42 cm^3 10/14/20 0949   Wound Healing % 99 10/14/20 0949   Post-Procedure Length (cm) 1.8 cm 10/14/20 100   Post-Procedure Width (cm) 9.5 cm 10/14/20 1005   Post-Procedure Depth (cm) 0.2 cm 10/14/20 1005   Post-Procedure Surface Area (cm^2) 17.1 cm^2 10/14/20 100   Post-Procedure Volume (cm^3) 3.42 cm^3 10/14/20 100   Wound Assessment Bleeding

## 2020-10-14 NOTE — PLAN OF CARE
Discharge instructions given. Patient verbalized understanding. Return to [facility] in 1 week.

## 2020-10-21 ENCOUNTER — HOSPITAL ENCOUNTER (OUTPATIENT)
Dept: WOUND CARE | Age: 70
Discharge: HOME | End: 2020-10-21
Payer: COMMERCIAL

## 2020-10-21 VITALS
RESPIRATION RATE: 16 BRPM | HEART RATE: 71 BPM | TEMPERATURE: 97.6 F | SYSTOLIC BLOOD PRESSURE: 129 MMHG | DIASTOLIC BLOOD PRESSURE: 68 MMHG

## 2020-10-21 PROCEDURE — 11045 DBRDMT SUBQ TISS EACH ADDL: CPT

## 2020-10-21 PROCEDURE — 11042 DBRDMT SUBQ TIS 1ST 20SQCM/<: CPT

## 2020-10-21 PROCEDURE — 11042 DBRDMT SUBQ TIS 1ST 20SQCM/<: CPT | Performed by: SURGERY

## 2020-10-21 PROCEDURE — 11045 DBRDMT SUBQ TISS EACH ADDL: CPT | Performed by: SURGERY

## 2020-10-21 RX ORDER — LIDOCAINE HYDROCHLORIDE 40 MG/ML
SOLUTION TOPICAL ONCE
Status: CANCELLED | OUTPATIENT
Start: 2020-10-21

## 2020-10-21 RX ORDER — BETAMETHASONE DIPROPIONATE 0.05 %
OINTMENT (GRAM) TOPICAL ONCE
Status: CANCELLED | OUTPATIENT
Start: 2020-10-21

## 2020-10-21 RX ORDER — LIDOCAINE 40 MG/G
CREAM TOPICAL ONCE
Status: DISCONTINUED | OUTPATIENT
Start: 2020-10-21 | End: 2020-10-22 | Stop reason: HOSPADM

## 2020-10-21 RX ORDER — GINSENG 100 MG
CAPSULE ORAL ONCE
Status: CANCELLED | OUTPATIENT
Start: 2020-10-21

## 2020-10-21 RX ORDER — BACITRACIN ZINC AND POLYMYXIN B SULFATE 500; 1000 [USP'U]/G; [USP'U]/G
OINTMENT TOPICAL ONCE
Status: CANCELLED | OUTPATIENT
Start: 2020-10-21

## 2020-10-21 RX ORDER — CLOBETASOL PROPIONATE 0.5 MG/G
OINTMENT TOPICAL ONCE
Status: CANCELLED | OUTPATIENT
Start: 2020-10-21

## 2020-10-21 RX ORDER — LIDOCAINE 50 MG/G
OINTMENT TOPICAL ONCE
Status: CANCELLED | OUTPATIENT
Start: 2020-10-21

## 2020-10-21 RX ORDER — GENTAMICIN SULFATE 1 MG/G
OINTMENT TOPICAL ONCE
Status: CANCELLED | OUTPATIENT
Start: 2020-10-21

## 2020-10-21 RX ORDER — LIDOCAINE 40 MG/G
CREAM TOPICAL ONCE
Status: CANCELLED | OUTPATIENT
Start: 2020-10-21

## 2020-10-21 RX ORDER — BACITRACIN, NEOMYCIN, POLYMYXIN B 400; 3.5; 5 [USP'U]/G; MG/G; [USP'U]/G
OINTMENT TOPICAL ONCE
Status: CANCELLED | OUTPATIENT
Start: 2020-10-21

## 2020-10-21 RX ORDER — LIDOCAINE HYDROCHLORIDE 20 MG/ML
JELLY TOPICAL ONCE
Status: CANCELLED | OUTPATIENT
Start: 2020-10-21

## 2020-10-21 NOTE — PROGRESS NOTES
[address] Procedure Note    Maricarmen Elmore  AGE: 69 y.o. GENDER: female    : 1950  TODAY'S DATE: 10/21/2020    Chief Complaint   Patient presents with    Wound Check     left groin        History of Present Illness     Maricarmen Elmore is a 69 y.o. female who presents today for wound evaluation. History of Wound: infectious wound located on the left groin.    Wound Pain:  mild  Severity:  1 / 10   Wound Type:  infectious  Modifying Factors:  diabetes, poor glucose control, decreased mobility and obesity  Associated Signs/Symptoms:  none    Past Medical History:   Diagnosis Date    Diabetes mellitus ([address]      Past Surgical History:   Procedure Laterality Date    GROIN SURGERY Left 2020    INCISION AND DRAINAGE OF LEFT GROIN AND LEFT UPPER THIGH, DEBRIDEMENT OF LEFT GROIN AND LEFT UPPER THIGH performed by Thompson Mckee MD at [address] [place] [address]     Current Outpatient Medications   Medication Sig Dispense Refill    aspirin 81 MG chewable tablet Take 81 mg by mouth daily      atorvastatin (LIPITOR) 40 MG tablet Take 40 mg by mouth daily      ticagrelor (BRILINTA) 90 MG TABS tablet Take 90 mg by mouth 2 times daily      fenofibrate (TRICOR) 145 MG tablet Take 145 mg by mouth daily      furosemide (LASIX) 40 MG tablet Take 40 mg by mouth daily      isosorbide mononitrate (IMDUR) 30 MG extended release tablet Take 30 mg by mouth daily      insulin detemir (LEVEMIR) 100 UNIT/ML injection vial Inject 30 Units into the skin nightly      losartan (COZAAR) 50 MG tablet Take 50 mg by mouth daily      melatonin 3 MG TABS tablet Take 3 mg by mouth daily      metoclopramide (REGLAN) 5 MG tablet Take 5 mg by mouth 4 times daily      metoprolol tartrate (LOPRESSOR) 50 MG tablet Take 50 mg by mouth 2 times daily      insulin regular (HUMULIN R;NOVOLIN R) 100 UNIT/ML injection Inject 10 Units into the skin 3 times daily      OXcarbazepine (TRILEPTAL) 600 MG tablet Take 600 mg by mouth 2 times daily      oxybutynin (DITROPAN) 5 MG tablet Take 5 mg by mouth 2 times daily      sertraline (ZOLOFT) 50 MG tablet Take 50 mg by mouth daily      Cholecalciferol (VITAMIN D3) 125 MCG (5000 UT) TABS Take by mouth       Current Facility-Administered Medications   Medication Dose Route Frequency Provider Last Rate Last Dose    lidocaine (LMX) 4 % cream   Topical Once Armandnd MD Rylie          Allergies:  Chocolate and Seroquel [quetiapine]    Procedure Note:     Performed by: Mandy Aguila MD    Consent obtained: Yes    Time out taken: Yes    Pain Control: Anesthetic  Anesthetic: 4% Lidocaine Cream     Debridement: Excisional Debridement    Using curette the wound was sharply debrided    down through and including the removal of epidermis, dermis and subcutaneous tissue.         Devitalized Tissue Debrided: fibrin, biofilm and slough    Pre Debridement Measurements:  Are located in the Wound Documentation Flow Sheet     Wound #: 1     Post  Debridement Measurements:  Wound 08/26/20 Groin Left #1 (Active)   Wound Image   08/26/20 0955   Wound Etiology Non-Healing Surgical 10/21/20 0937   Wound Cleansed Cleansed with saline 10/21/20 0950   Dressing/Treatment Moist to dry;Dry dressing 10/14/20 1022   Wound Length (cm) 3 cm 10/21/20 0937   Wound Width (cm) 9.5 cm 10/21/20 0937   Wound Depth (cm) 0.1 cm 10/21/20 0937   Wound Surface Area (cm^2) 28.5 cm^2 10/21/20 0937   Change in Wound Size % (l*w) 66.47 10/21/20 0937   Wound Volume (cm^3) 2.85 cm^3 10/21/20 0937   Wound Healing % 99 10/21/20 0937   Post-Procedure Length (cm) 3 cm 10/21/20 0950   Post-Procedure Width (cm) 9.5 cm 10/21/20 0950   Post-Procedure Depth (cm) 0.1 cm 10/21/20 0950   Post-Procedure Surface Area (cm^2) 28.5 cm^2 10/21/20 0950   Post-Procedure Volume (cm^3) 2.85 cm^3 10/21/20 0950   Wound Assessment Bleeding 10/21/20 0950   Drainage Amount Moderate 10/21/20 0950   Drainage Description Serosanguinous 10/21/20 0937   Odor None 10/21/20 0937   Wound Thickness Description not for Pressure Injury Full thickness 09/30/20 0904   Number of days: 56       Total Surface Area Debrided:  28.5 sq cm     Bleeding:  Minimal    Hemostasis Achieved:  by pressure    Procedural Pain:  1  / 10     Post Procedural Pain:  1 / 10     Response to treatment:  Well tolerated by patient. Assessment:     Wound looks improved. (improved, worse or stable)    Patient tolerated procedure well and was given proper instruction. The nature of the patient's condition was explained in depth. The patient was informed that their compliance to the treatment plan is paramount to successful healing and prevention of further ulceration and/or infection       Plan:     Treatment Plan: With each dressing change, rinse wounds with 0.9% Saline. (May use wound wash or soft contact solution. Both can be purchased at a local drug store). If unable to obtain saline, may use a gentle soap and water.     Dressing care: Moist to dry, dry dressing- change daily    Sudhakar Quach MD, FACS  10/21/2020  10:10 AM

## 2020-10-28 ENCOUNTER — HOSPITAL ENCOUNTER (OUTPATIENT)
Dept: WOUND CARE | Age: 70
Discharge: HOME | End: 2020-10-28
Payer: COMMERCIAL

## 2020-10-28 VITALS
RESPIRATION RATE: 16 BRPM | HEART RATE: 75 BPM | DIASTOLIC BLOOD PRESSURE: 79 MMHG | TEMPERATURE: 97.8 F | SYSTOLIC BLOOD PRESSURE: 131 MMHG

## 2020-10-28 PROCEDURE — 11042 DBRDMT SUBQ TIS 1ST 20SQCM/<: CPT

## 2020-10-28 PROCEDURE — 11045 DBRDMT SUBQ TISS EACH ADDL: CPT | Performed by: SURGERY

## 2020-10-28 PROCEDURE — 11045 DBRDMT SUBQ TISS EACH ADDL: CPT

## 2020-10-28 PROCEDURE — 11042 DBRDMT SUBQ TIS 1ST 20SQCM/<: CPT | Performed by: SURGERY

## 2020-10-28 RX ORDER — BACITRACIN ZINC AND POLYMYXIN B SULFATE 500; 1000 [USP'U]/G; [USP'U]/G
OINTMENT TOPICAL ONCE
Status: CANCELLED | OUTPATIENT
Start: 2020-10-28

## 2020-10-28 RX ORDER — LIDOCAINE 40 MG/G
CREAM TOPICAL ONCE
Status: CANCELLED | OUTPATIENT
Start: 2020-10-28

## 2020-10-28 RX ORDER — LIDOCAINE 40 MG/G
CREAM TOPICAL ONCE
Status: DISCONTINUED | OUTPATIENT
Start: 2020-10-28 | End: 2020-10-29 | Stop reason: HOSPADM

## 2020-10-28 RX ORDER — GINSENG 100 MG
CAPSULE ORAL ONCE
Status: CANCELLED | OUTPATIENT
Start: 2020-10-28

## 2020-10-28 RX ORDER — BETAMETHASONE DIPROPIONATE 0.05 %
OINTMENT (GRAM) TOPICAL ONCE
Status: CANCELLED | OUTPATIENT
Start: 2020-10-28

## 2020-10-28 RX ORDER — LIDOCAINE HYDROCHLORIDE 20 MG/ML
JELLY TOPICAL ONCE
Status: CANCELLED | OUTPATIENT
Start: 2020-10-28

## 2020-10-28 RX ORDER — LIDOCAINE 50 MG/G
OINTMENT TOPICAL ONCE
Status: CANCELLED | OUTPATIENT
Start: 2020-10-28

## 2020-10-28 RX ORDER — LIDOCAINE HYDROCHLORIDE 40 MG/ML
SOLUTION TOPICAL ONCE
Status: CANCELLED | OUTPATIENT
Start: 2020-10-28

## 2020-10-28 RX ORDER — CLOBETASOL PROPIONATE 0.5 MG/G
OINTMENT TOPICAL ONCE
Status: CANCELLED | OUTPATIENT
Start: 2020-10-28

## 2020-10-28 RX ORDER — GENTAMICIN SULFATE 1 MG/G
OINTMENT TOPICAL ONCE
Status: CANCELLED | OUTPATIENT
Start: 2020-10-28

## 2020-10-28 RX ORDER — BACITRACIN, NEOMYCIN, POLYMYXIN B 400; 3.5; 5 [USP'U]/G; MG/G; [USP'U]/G
OINTMENT TOPICAL ONCE
Status: CANCELLED | OUTPATIENT
Start: 2020-10-28

## 2020-10-28 NOTE — PROGRESS NOTES
[address] Progress and Procedure Note    Carolina Fitzgerald  AGE: 69 y.o. GENDER: female    : 1950  TODAY'S DATE: 10/28/2020    Chief Complaint   Patient presents with    Wound Check     left groin        History of Present Illness     Carolina Fitzgerald is a 69 y.o. female who presents today for wound evaluation. History of Wound: infectious wound located on the left groin.    Wound Pain:  mild  Severity:  1 / 10   Wound Type:  infectious  Modifying Factors:  diabetes, poor glucose control, decreased mobility and obesity  Associated Signs/Symptoms:  none    Past Medical History:   Diagnosis Date    Diabetes mellitus ([facility] [address]      Past Surgical History:   Procedure Laterality Date    GROIN SURGERY Left 2020    INCISION AND DRAINAGE OF LEFT GROIN AND LEFT UPPER THIGH, DEBRIDEMENT OF LEFT GROIN AND LEFT UPPER THIGH performed by True Collier MD at [address] [place] [address]     Current Outpatient Medications   Medication Sig Dispense Refill    aspirin 81 MG chewable tablet Take 81 mg by mouth daily      atorvastatin (LIPITOR) 40 MG tablet Take 40 mg by mouth daily      ticagrelor (BRILINTA) 90 MG TABS tablet Take 90 mg by mouth 2 times daily      fenofibrate (TRICOR) 145 MG tablet Take 145 mg by mouth daily      furosemide (LASIX) 40 MG tablet Take 40 mg by mouth daily      isosorbide mononitrate (IMDUR) 30 MG extended release tablet Take 30 mg by mouth daily      insulin detemir (LEVEMIR) 100 UNIT/ML injection vial Inject 30 Units into the skin nightly      losartan (COZAAR) 50 MG tablet Take 50 mg by mouth daily      melatonin 3 MG TABS tablet Take 3 mg by mouth daily      metoclopramide (REGLAN) 5 MG tablet Take 5 mg by mouth 4 times daily      metoprolol tartrate (LOPRESSOR) 50 MG tablet Take 50 mg by mouth 2 times daily      insulin regular (HUMULIN R;NOVOLIN R) 100 UNIT/ML injection Inject 10 Units into the skin 3 times daily      OXcarbazepine (TRILEPTAL) 600 MG tablet Take 600 mg by mouth 2 times daily      oxybutynin (DITROPAN) 5 MG tablet Take 5 mg by mouth 2 times daily      sertraline (ZOLOFT) 50 MG tablet Take 50 mg by mouth daily      Cholecalciferol (VITAMIN D3) 125 MCG (5000 UT) TABS Take by mouth       Current Facility-Administered Medications   Medication Dose Route Frequency Provider Last Rate Last Dose    lidocaine (LMX) 4 % cream   Topical Once Jasmin Youngblood MD          Social History:   Social History     Tobacco Use    Smoking status: Never Smoker    Smokeless tobacco: Never Used   Substance Use Topics    Alcohol use: Never     Frequency: Never    Drug use: Never     Allergies:  Chocolate and Seroquel [quetiapine]    Procedure: Indications:  Based on my examination of this patient's wound(s)/ulcer(s) today, debridement is required to promote healing and evaluate the wound base. Performed by: Mandy Aguila MD    Consent obtained: Yes    Time out taken: Yes    Pain Control: Anesthetic  Anesthetic: 4% Lidocaine Cream     Debridement: Excisional Debridement    Using curette the wound was sharply debrided    down through and including the removal of epidermis, dermis and subcutaneous tissue.         Devitalized Tissue Debrided: fibrin, biofilm and slough    Pre Debridement Measurements:  Are located in the Wound Documentation Flow Sheet     Wound #: 1     Post  Debridement Measurements:  Wound 08/26/20 Groin Left #1 (Active)   Wound Image   10/28/20 1010   Wound Etiology Non-Healing Surgical 10/28/20 1010   Wound Cleansed Cleansed with saline 10/28/20 1020   Dressing/Treatment Moist to dry 10/21/20 1044   Wound Length (cm) 3 cm 10/28/20 1010   Wound Width (cm) 7.5 cm 10/28/20 1010   Wound Depth (cm) 0.1 cm 10/28/20 1010   Wound Surface Area (cm^2) 22.5 cm^2 10/28/20 1010   Change in Wound Size % (l*w) 73.53 10/28/20 1010   Wound Volume (cm^3) 2.25 cm^3 10/28/20 1010   Wound Healing % 99 10/28/20 1010   Post-Procedure Length (cm) 3 cm 10/28/20 1020 Post-Procedure Width (cm) 7.5 cm 10/28/20 1020   Post-Procedure Depth (cm) 0.1 cm 10/28/20 1020   Post-Procedure Surface Area (cm^2) 22.5 cm^2 10/28/20 1020   Post-Procedure Volume (cm^3) 2.25 cm^3 10/28/20 1020   Wound Assessment Bleeding 10/28/20 1020   Drainage Amount Moderate 10/28/20 1020   Drainage Description Serosanguinous 10/28/20 1010   Odor None 10/28/20 1010   Margins Attached edges 10/28/20 1010   Wound Thickness Description not for Pressure Injury Full thickness 10/28/20 1010   Number of days: 63       Percent of Wound(s)/Ulcer(s) Debrided: 100%    Total Surface Area Debrided:  22.5 sq cm     Bleeding:  Minimal    Hemostasis Achieved:  by pressure    Procedural Pain:  1  / 10     Post Procedural Pain:  1 / 10     Response to treatment:  Well tolerated by patient. Assessment:     Wound looks improved. (improved, worse or stable)    Patient tolerated procedure well and was given proper instruction. The nature of the patient's condition was explained in depth. The patient was informed that their compliance to the treatment plan is paramount to successful healing and prevention of further ulceration and/or infection     Plan:     Treatment Plan:       Treatment Note please see attached Discharge Instructions    Written patient dismissal instructions given to patient and signed by patient or POA. Discharge [address]  Telephone: [phone]     FAX [phone]      Discharge Instructions:  Keep weight off wounds and reposition every 2 hours if applicable. Avoid standing for long periods of time.      If wound(s) is on your lower extremity, elevate legs to the level of the heart or above for 30 minutes 4-5 times a day and/or when sitting.      Do not get wounds wet in bath or shower unless otherwise instructed by your physician. If your wound is on you foot or leg, may purchase a cast bag.  Please ask at the FACS  10/28/2020  10:23 AM

## 2020-10-28 NOTE — PLAN OF CARE
Discharge instructions given. Patient verbalized understanding. Return to [facility] in 1 week.

## 2020-11-04 ENCOUNTER — HOSPITAL ENCOUNTER (OUTPATIENT)
Dept: WOUND CARE | Age: 70
Discharge: HOME | End: 2020-11-04
Payer: COMMERCIAL

## 2020-11-11 ENCOUNTER — HOSPITAL ENCOUNTER (OUTPATIENT)
Dept: WOUND CARE | Age: 70
Discharge: HOME | End: 2020-11-11
Payer: COMMERCIAL

## 2020-11-11 VITALS
SYSTOLIC BLOOD PRESSURE: 123 MMHG | DIASTOLIC BLOOD PRESSURE: 62 MMHG | BODY MASS INDEX: 34.56 KG/M2 | HEART RATE: 75 BPM | WEIGHT: 234 LBS | TEMPERATURE: 97.2 F

## 2020-11-11 PROCEDURE — 11042 DBRDMT SUBQ TIS 1ST 20SQCM/<: CPT

## 2020-11-11 PROCEDURE — 11042 DBRDMT SUBQ TIS 1ST 20SQCM/<: CPT | Performed by: SURGERY

## 2020-11-11 RX ORDER — GENTAMICIN SULFATE 1 MG/G
OINTMENT TOPICAL ONCE
Status: CANCELLED | OUTPATIENT
Start: 2020-11-11

## 2020-11-11 RX ORDER — LIDOCAINE 40 MG/G
CREAM TOPICAL ONCE
Status: DISCONTINUED | OUTPATIENT
Start: 2020-11-11 | End: 2020-11-12 | Stop reason: HOSPADM

## 2020-11-11 RX ORDER — BACITRACIN ZINC AND POLYMYXIN B SULFATE 500; 1000 [USP'U]/G; [USP'U]/G
OINTMENT TOPICAL ONCE
Status: CANCELLED | OUTPATIENT
Start: 2020-11-11

## 2020-11-11 RX ORDER — LIDOCAINE 50 MG/G
OINTMENT TOPICAL ONCE
Status: CANCELLED | OUTPATIENT
Start: 2020-11-11

## 2020-11-11 RX ORDER — BACITRACIN, NEOMYCIN, POLYMYXIN B 400; 3.5; 5 [USP'U]/G; MG/G; [USP'U]/G
OINTMENT TOPICAL ONCE
Status: CANCELLED | OUTPATIENT
Start: 2020-11-11

## 2020-11-11 RX ORDER — GINSENG 100 MG
CAPSULE ORAL ONCE
Status: CANCELLED | OUTPATIENT
Start: 2020-11-11

## 2020-11-11 RX ORDER — BETAMETHASONE DIPROPIONATE 0.05 %
OINTMENT (GRAM) TOPICAL ONCE
Status: CANCELLED | OUTPATIENT
Start: 2020-11-11

## 2020-11-11 RX ORDER — LIDOCAINE 40 MG/G
CREAM TOPICAL ONCE
Status: CANCELLED | OUTPATIENT
Start: 2020-11-11

## 2020-11-11 RX ORDER — CLOBETASOL PROPIONATE 0.5 MG/G
OINTMENT TOPICAL ONCE
Status: CANCELLED | OUTPATIENT
Start: 2020-11-11

## 2020-11-11 RX ORDER — LIDOCAINE HYDROCHLORIDE 20 MG/ML
JELLY TOPICAL ONCE
Status: CANCELLED | OUTPATIENT
Start: 2020-11-11

## 2020-11-11 RX ORDER — LIDOCAINE HYDROCHLORIDE 40 MG/ML
SOLUTION TOPICAL ONCE
Status: CANCELLED | OUTPATIENT
Start: 2020-11-11

## 2020-11-11 NOTE — PLAN OF CARE
Discharge instructions given. Patient verbalized understanding. Return to [facility] in 1 week.

## 2020-11-11 NOTE — PROGRESS NOTES
[address] Progress and Procedure Note    Colton Stratton  AGE: 69 y.o. GENDER: female    : 1950  TODAY'S DATE: 2020    Chief Complaint   Patient presents with    Wound Check     groin  F/U        History of Present Illness     Colton Stratton is a 69 y.o. female who presents today for wound evaluation. History of Wound: infectious wound located on the left groin.    Wound Pain:  mild  Severity:  1 / 10   Wound Type:  infectious  Modifying Factors:  diabetes, poor glucose control, decreased mobility and obesity  Associated Signs/Symptoms:  none    Past Medical History:   Diagnosis Date    Diabetes mellitus ([facility] [address]      Past Surgical History:   Procedure Laterality Date    GROIN SURGERY Left 2020    INCISION AND DRAINAGE OF LEFT GROIN AND LEFT UPPER THIGH, DEBRIDEMENT OF LEFT GROIN AND LEFT UPPER THIGH performed by Ryann Bernal MD at [address] [place] [address]     Current Outpatient Medications   Medication Sig Dispense Refill    aspirin 81 MG chewable tablet Take 81 mg by mouth daily      atorvastatin (LIPITOR) 40 MG tablet Take 40 mg by mouth daily      ticagrelor (BRILINTA) 90 MG TABS tablet Take 90 mg by mouth 2 times daily      fenofibrate (TRICOR) 145 MG tablet Take 145 mg by mouth daily      furosemide (LASIX) 40 MG tablet Take 40 mg by mouth daily      isosorbide mononitrate (IMDUR) 30 MG extended release tablet Take 30 mg by mouth daily      insulin detemir (LEVEMIR) 100 UNIT/ML injection vial Inject 30 Units into the skin nightly      losartan (COZAAR) 50 MG tablet Take 50 mg by mouth daily      melatonin 3 MG TABS tablet Take 3 mg by mouth daily      metoclopramide (REGLAN) 5 MG tablet Take 5 mg by mouth 4 times daily      metoprolol tartrate (LOPRESSOR) 50 MG tablet Take 50 mg by mouth 2 times daily      insulin regular (HUMULIN R;NOVOLIN R) 100 UNIT/ML injection Inject 10 Units into the skin 3 times daily      OXcarbazepine (TRILEPTAL) 600 MG tablet Post-Procedure Width (cm) 6 cm 11/11/20 1030   Post-Procedure Depth (cm) 0.1 cm 11/11/20 1030   Post-Procedure Surface Area (cm^2) 16.8 cm^2 11/11/20 1030   Post-Procedure Volume (cm^3) 1.68 cm^3 11/11/20 1030   Wound Assessment Bleeding 11/11/20 1030   Drainage Amount Moderate 11/11/20 1030   Drainage Description Serosanguinous;Green;Brown 11/11/20 1025   Odor Mild 11/11/20 1025   Denise-wound Assessment Intact 11/11/20 1025   Margins Defined edges 11/11/20 1025   Wound Thickness Description not for Pressure Injury Full thickness 11/11/20 1025   Number of days: 77       Percent of Wound(s)/Ulcer(s) Debrided: 100%    Total Surface Area Debrided:  16.2 sq cm     Bleeding:  Minimal    Hemostasis Achieved:  by pressure    Procedural Pain:  1  / 10     Post Procedural Pain:  1 / 10     Response to treatment:  Well tolerated by patient. Assessment:     Wound looks improved. (improved, worse or stable)    Patient tolerated procedure well and was given proper instruction. The nature of the patient's condition was explained in depth. The patient was informed that their compliance to the treatment plan is paramount to successful healing and prevention of further ulceration and/or infection     Plan:     Treatment Plan:       Treatment Note please see attached Discharge Instructions    Written patient dismissal instructions given to patient and signed by patient or POA. Discharge [address]  Telephone: [phone]     FAX [phone]      Discharge Instructions:  Keep weight off wounds and reposition every 2 hours if applicable. Avoid standing for long periods of time.      If wound(s) is on your lower extremity, elevate legs to the level of the heart or above for 30 minutes 4-5 times a day and/or when sitting.      Do not get wounds wet in bath or shower unless otherwise instructed by your physician. If your wound is on you foot or leg, may purchase a cast bag. Please ask at the pharmacy.     When taking antibiotics take entire prescription as ordered by MD do not stop taking until medicine is all gone. Exercise as tolerated. No Smoking. Smoking prohibits wound healing.     If Vascular testing is ordered, please call [address] [place] [address] [phone]) to schedule.     Vascular tests ordered by Wound Care Physicians may take up to 2 hours to complete. Please keep that in mind when scheduling.      If Vascular testing is scheduled, please bring supplies to replace your dressing after testing is done. The vascular department does not stock supplies.      Wound: Left groin      With each dressing change, rinse wounds with 0.9% Saline. (May use wound wash or soft contact solution. Both can be purchased at a local drug store). If unable to obtain saline, may use a gentle soap and water.     Dressing care: Moist to dry, dry dressing- change daily     Next Dressing change due ___as instructed____________     Important dietary reminders:  1. Increase Protein intake for optimal wound healing  2. No added salt  3. If diabetic, good glucose control     Follow up with Dr Maria Eugenia Lin In 1 week in the wound care center.      Call [phone] for any questions or concerns.      Your  is [address] Agency/Supply Company: [email or web address]  [address] Information: Should you experience any significant changes in your wound(s) or have questions about your wound care, please contact the [facility] 30  [phone] Monday  - Thursday 8:00 am - 4:00 pm and Friday 8:00 am - 1:00pm. If you need help with your wound outside these hours and cannot wait until we are again available, contact your PCP or go to the hospital emergency room.      PLEASE NOTE: IF YOU ARE UNABLE TO OBTAIN WOUND SUPPLIES, CONTINUE TO USE THE SUPPLIES YOU HAVE AVAILABLE UNTIL YOU ARE ABLE TO [address].  IT IS MOST IMPORTANT TO KEEP THE WOUND COVERED AT ALL TIMES. Iggy Langston 6  Yoon De Luna MD, FACS  11/11/2020  2:35 PM

## 2020-11-18 ENCOUNTER — HOSPITAL ENCOUNTER (OUTPATIENT)
Dept: WOUND CARE | Age: 70
Discharge: HOME | End: 2020-11-18
Payer: COMMERCIAL

## 2020-11-18 VITALS
TEMPERATURE: 97.1 F | DIASTOLIC BLOOD PRESSURE: 80 MMHG | RESPIRATION RATE: 16 BRPM | SYSTOLIC BLOOD PRESSURE: 132 MMHG | HEART RATE: 84 BPM

## 2020-11-18 PROCEDURE — 11042 DBRDMT SUBQ TIS 1ST 20SQCM/<: CPT | Performed by: SURGERY

## 2020-11-18 PROCEDURE — 11042 DBRDMT SUBQ TIS 1ST 20SQCM/<: CPT

## 2020-11-18 RX ORDER — LIDOCAINE 50 MG/G
OINTMENT TOPICAL ONCE
Status: CANCELLED | OUTPATIENT
Start: 2020-11-18

## 2020-11-18 RX ORDER — BACITRACIN ZINC AND POLYMYXIN B SULFATE 500; 1000 [USP'U]/G; [USP'U]/G
OINTMENT TOPICAL ONCE
Status: CANCELLED | OUTPATIENT
Start: 2020-11-18

## 2020-11-18 RX ORDER — LIDOCAINE HYDROCHLORIDE 20 MG/ML
JELLY TOPICAL ONCE
Status: CANCELLED | OUTPATIENT
Start: 2020-11-18

## 2020-11-18 RX ORDER — CLOBETASOL PROPIONATE 0.5 MG/G
OINTMENT TOPICAL ONCE
Status: CANCELLED | OUTPATIENT
Start: 2020-11-18

## 2020-11-18 RX ORDER — LIDOCAINE HYDROCHLORIDE 40 MG/ML
SOLUTION TOPICAL ONCE
Status: CANCELLED | OUTPATIENT
Start: 2020-11-18

## 2020-11-18 RX ORDER — LIDOCAINE 40 MG/G
CREAM TOPICAL ONCE
Status: CANCELLED | OUTPATIENT
Start: 2020-11-18

## 2020-11-18 RX ORDER — BACITRACIN, NEOMYCIN, POLYMYXIN B 400; 3.5; 5 [USP'U]/G; MG/G; [USP'U]/G
OINTMENT TOPICAL ONCE
Status: CANCELLED | OUTPATIENT
Start: 2020-11-18

## 2020-11-18 RX ORDER — GENTAMICIN SULFATE 1 MG/G
OINTMENT TOPICAL ONCE
Status: CANCELLED | OUTPATIENT
Start: 2020-11-18

## 2020-11-18 RX ORDER — BETAMETHASONE DIPROPIONATE 0.05 %
OINTMENT (GRAM) TOPICAL ONCE
Status: CANCELLED | OUTPATIENT
Start: 2020-11-18

## 2020-11-18 RX ORDER — GINSENG 100 MG
CAPSULE ORAL ONCE
Status: CANCELLED | OUTPATIENT
Start: 2020-11-18

## 2020-11-18 RX ORDER — LIDOCAINE 40 MG/G
CREAM TOPICAL ONCE
Status: DISCONTINUED | OUTPATIENT
Start: 2020-11-18 | End: 2020-11-19 | Stop reason: HOSPADM

## 2020-11-18 NOTE — PLAN OF CARE
[address]  Telephone: [phone]            Other [address]    Discharge Instructions       [facility]  [facility] [address] [place] [address]  Telephone: [phone]     FAX [phone]      Discharge Instructions:  Keep weight off wounds and reposition every 2 hours if applicable. Avoid standing for long periods of time.      If wound(s) is on your lower extremity, elevate legs to the level of the heart or above for 30 minutes 4-5 times a day and/or when sitting.      Do not get wounds wet in bath or shower unless otherwise instructed by your physician. If your wound is on you foot or leg, may purchase a cast bag. Please ask at the pharmacy.     When taking antibiotics take entire prescription as ordered by MD do not stop taking until medicine is all gone. Exercise as tolerated. No Smoking. Smoking prohibits wound healing.     If Vascular testing is ordered, please call [address] [place] [address] [phone]) to schedule.     Vascular tests ordered by Wound Care Physicians may take up to 2 hours to complete. Please keep that in mind when scheduling.      If Vascular testing is scheduled, please bring supplies to replace your dressing after testing is done. The vascular department does not stock supplies.      Wound: Left groin      With each dressing change, rinse wounds with 0.9% Saline. (May use wound wash or soft contact solution. Both can be purchased at a local drug store). If unable to obtain saline, may use a gentle soap and water.     Dressing care: Moist to dry, dry dressing- change daily     Next Dressing change due ___as instructed____________     Important dietary reminders:  1. Increase Protein intake for optimal wound healing  2. No added salt  3.  If diabetic, good glucose control     Follow up with Dr Zoe Gooden In 1 week in the wound care center.      Call [phone] for any questions or concerns.      Your  is [address] Agency/Supply Company: Jasmin [address] 63 Information: Should you experience any significant changes in your wound(s) or have questions about your wound care, please contact the [address] [phone] Monday  - Thursday 8:00 am - 4:00 pm and Friday 8:00 am - 1:00pm. If you need help with your wound outside these hours and cannot wait until we are again available, contact your PCP or go to the hospital emergency room.      PLEASE NOTE: IF YOU ARE UNABLE TO OBTAIN WOUND SUPPLIES, CONTINUE TO USE THE SUPPLIES YOU HAVE AVAILABLE UNTIL YOU ARE ABLE TO [facility]. IT IS MOST IMPORTANT TO KEEP THE WOUND COVERED AT ALL TIMES. Skilled nurse to evaluate and treat for wound care. Change dressing as ordered  once a day on Monday, Tuesday, Wednesday, Thursday, Friday, Saturday and Sunday using clean technique. Patient/Family/caregiver may change dressings as needed as instructed when Home Care unavailable.     WOUNDS REQUIRING DRESSING Changes:     Wound 08/26/20 Groin Left #1 (Active)   Wound Image   10/28/20 1010   Wound Etiology Non-Healing Surgical 11/18/20 1025   Wound Cleansed Cleansed with saline 11/18/20 1025   Dressing/Treatment Moist to dry 11/11/20 1030   Wound Length (cm) 2.2 cm 11/18/20 1025   Wound Width (cm) 6.6 cm 11/18/20 1025   Wound Depth (cm) 0.1 cm 11/18/20 1025   Wound Surface Area (cm^2) 14.52 cm^2 11/18/20 1025   Change in Wound Size % (l*w) 82.92 11/18/20 1025   Wound Volume (cm^3) 1.45 cm^3 11/18/20 1025   Wound Healing % 100 11/18/20 1025   Post-Procedure Length (cm) 2.2 cm 11/18/20 1030   Post-Procedure Width (cm) 6.6 cm 11/18/20 1030   Post-Procedure Depth (cm) 0.1 cm 11/18/20 1030   Post-Procedure Surface Area (cm^2) 14.52 cm^2 11/18/20 1030   Post-Procedure Volume (cm^3) 1.45 cm^3 11/18/20 1030   Wound Assessment Bleeding 11/18/20 1030   Drainage Amount Moderate 11/18/20 1025   Drainage Description Serosanguinous;Green;Brown 11/11/20 1025   Odor Moderate 11/18/20 1025   Denise-wound Assessment Intact 11/18/20 1025   Margins Defined edges 11/18/20 1025   Wound Thickness Description not for Pressure Injury Full thickness 11/18/20 1025   Number of days: 84          Patient seen and treated on 11/18/2020    By El Amin MD NPI: [id number]   (provider/NPI)

## 2020-11-18 NOTE — PLAN OF CARE
Discharge instructions given. Patient verbalized understanding. Return to [facility] in 1 week.   Called/faxed orders to [facility]

## 2020-11-19 NOTE — PROGRESS NOTES
[address] Progress and Procedure Note    Serena Yepez  AGE: 69 y.o. GENDER: female    : 1950  TODAY'S DATE: 2020    Chief Complaint   Patient presents with    Wound Check     left groin        History of Present Illness     Serena Yepez is a 69 y.o. female who presents today for wound evaluation. History of Wound: infectious wound located on the left groin.    Wound Pain:  mild  Severity:  1 / 10   Wound Type:  infectious  Modifying Factors:  diabetes, poor glucose control, decreased mobility and obesity  Associated Signs/Symptoms:  none    Past Medical History:   Diagnosis Date    Diabetes mellitus ([facility] [address]      Past Surgical History:   Procedure Laterality Date    GROIN SURGERY Left 2020    INCISION AND DRAINAGE OF LEFT GROIN AND LEFT UPPER THIGH, DEBRIDEMENT OF LEFT GROIN AND LEFT UPPER THIGH performed by Tu Jerome MD at [address] [place] [address]     Current Outpatient Medications   Medication Sig Dispense Refill    aspirin 81 MG chewable tablet Take 81 mg by mouth daily      atorvastatin (LIPITOR) 40 MG tablet Take 40 mg by mouth daily      ticagrelor (BRILINTA) 90 MG TABS tablet Take 90 mg by mouth 2 times daily      fenofibrate (TRICOR) 145 MG tablet Take 145 mg by mouth daily      furosemide (LASIX) 40 MG tablet Take 40 mg by mouth daily      isosorbide mononitrate (IMDUR) 30 MG extended release tablet Take 30 mg by mouth daily      insulin detemir (LEVEMIR) 100 UNIT/ML injection vial Inject 30 Units into the skin nightly      losartan (COZAAR) 50 MG tablet Take 50 mg by mouth daily      melatonin 3 MG TABS tablet Take 3 mg by mouth daily      metoclopramide (REGLAN) 5 MG tablet Take 5 mg by mouth 4 times daily      metoprolol tartrate (LOPRESSOR) 50 MG tablet Take 50 mg by mouth 2 times daily      insulin regular (HUMULIN R;NOVOLIN R) 100 UNIT/ML injection Inject 10 Units into the skin 3 times daily      OXcarbazepine (TRILEPTAL) 600 MG tablet 1030   Post-Procedure Width (cm) 6.6 cm 11/18/20 1030   Post-Procedure Depth (cm) 0.1 cm 11/18/20 1030   Post-Procedure Surface Area (cm^2) 14.52 cm^2 11/18/20 1030   Post-Procedure Volume (cm^3) 1.45 cm^3 11/18/20 1030   Wound Assessment Bleeding 11/18/20 1030   Drainage Amount Moderate 11/18/20 1025   Drainage Description Serosanguinous;Green;Brown 11/11/20 1025   Odor Moderate 11/18/20 1025   Denise-wound Assessment Intact 11/18/20 1025   Margins Defined edges 11/18/20 1025   Wound Thickness Description not for Pressure Injury Full thickness 11/18/20 1025   Number of days: 84       Percent of Wound(s)/Ulcer(s) Debrided: 100%    Total Surface Area Debrided:  14.52 sq cm     Bleeding:  Minimal    Hemostasis Achieved:  by pressure    Procedural Pain:  1  / 10     Post Procedural Pain:  1 / 10     Response to treatment:  Well tolerated by patient. Assessment:     Wound looks improved. (improved, worse or stable)    Patient tolerated procedure well and was given proper instruction. The nature of the patient's condition was explained in depth. The patient was informed that their compliance to the treatment plan is paramount to successful healing and prevention of further ulceration and/or infection     Plan:     Treatment Plan:       Treatment Note please see attached Discharge Instructions    Written patient dismissal instructions given to patient and signed by patient or POA. Discharge [address]  Telephone: [phone]     FAX [phone]      Discharge Instructions:  Keep weight off wounds and reposition every 2 hours if applicable. Avoid standing for long periods of time.      If wound(s) is on your lower extremity, elevate legs to the level of the heart or above for 30 minutes 4-5 times a day and/or when sitting.      Do not get wounds wet in bath or shower unless otherwise instructed by your physician. If your wound is on you foot or leg, may purchase a cast bag. Please ask at the pharmacy.     When taking antibiotics take entire prescription as ordered by MD do not stop taking until medicine is all gone. Exercise as tolerated. No Smoking. Smoking prohibits wound healing.     If Vascular testing is ordered, please call [address] [place] [address] [phone]) to schedule.     Vascular tests ordered by Wound Care Physicians may take up to 2 hours to complete. Please keep that in mind when scheduling.      If Vascular testing is scheduled, please bring supplies to replace your dressing after testing is done. The vascular department does not stock supplies.      Wound: Left groin      With each dressing change, rinse wounds with 0.9% Saline. (May use wound wash or soft contact solution. Both can be purchased at a local drug store). If unable to obtain saline, may use a gentle soap and water.     Dressing care: Moist to dry, dry dressing- change daily     Next Dressing change due ___as instructed____________     Important dietary reminders:  1. Increase Protein intake for optimal wound healing  2. No added salt  3. If diabetic, good glucose control     Follow up with Dr Jose Russell In 1 week in the wound care center.      Call [phone] for any questions or concerns.      Your  is [email or web address] Agency/Supply Company: [employer] [address] Information: Should you experience any significant changes in your wound(s) or have questions about your wound care, please contact the [facility] 30  [phone] Monday  - Thursday 8:00 am - 4:00 pm and Friday 8:00 am - 1:00pm. If you need help with your wound outside these hours and cannot wait until we are again available, contact your PCP or go to the hospital emergency room.      PLEASE NOTE: IF YOU ARE UNABLE TO OBTAIN WOUND SUPPLIES, CONTINUE TO USE THE SUPPLIES YOU HAVE AVAILABLE UNTIL YOU ARE ABLE TO [address].  IT IS MOST IMPORTANT TO KEEP THE WOUND COVERED AT ALL TIMES. [address] 6  Aron Gerber MD, FACS  11/18/2020  9:45 PM

## 2020-11-25 ENCOUNTER — HOSPITAL ENCOUNTER (OUTPATIENT)
Dept: WOUND CARE | Age: 70
Discharge: HOME | End: 2020-11-25
Payer: COMMERCIAL

## 2020-11-25 VITALS — RESPIRATION RATE: 18 BRPM | HEART RATE: 81 BPM | DIASTOLIC BLOOD PRESSURE: 84 MMHG | SYSTOLIC BLOOD PRESSURE: 187 MMHG

## 2020-11-25 PROCEDURE — 11042 DBRDMT SUBQ TIS 1ST 20SQCM/<: CPT

## 2020-11-25 PROCEDURE — 11042 DBRDMT SUBQ TIS 1ST 20SQCM/<: CPT | Performed by: SURGERY

## 2020-11-25 RX ORDER — GENTAMICIN SULFATE 1 MG/G
OINTMENT TOPICAL ONCE
Status: CANCELLED | OUTPATIENT
Start: 2020-11-25

## 2020-11-25 RX ORDER — LIDOCAINE 40 MG/G
CREAM TOPICAL ONCE
Status: DISCONTINUED | OUTPATIENT
Start: 2020-11-25 | End: 2020-11-26 | Stop reason: HOSPADM

## 2020-11-25 RX ORDER — LIDOCAINE HYDROCHLORIDE 20 MG/ML
JELLY TOPICAL ONCE
Status: CANCELLED | OUTPATIENT
Start: 2020-11-25

## 2020-11-25 RX ORDER — GINSENG 100 MG
CAPSULE ORAL ONCE
Status: CANCELLED | OUTPATIENT
Start: 2020-11-25

## 2020-11-25 RX ORDER — BACITRACIN, NEOMYCIN, POLYMYXIN B 400; 3.5; 5 [USP'U]/G; MG/G; [USP'U]/G
OINTMENT TOPICAL ONCE
Status: CANCELLED | OUTPATIENT
Start: 2020-11-25

## 2020-11-25 RX ORDER — BACITRACIN ZINC AND POLYMYXIN B SULFATE 500; 1000 [USP'U]/G; [USP'U]/G
OINTMENT TOPICAL ONCE
Status: CANCELLED | OUTPATIENT
Start: 2020-11-25

## 2020-11-25 RX ORDER — BETAMETHASONE DIPROPIONATE 0.05 %
OINTMENT (GRAM) TOPICAL ONCE
Status: CANCELLED | OUTPATIENT
Start: 2020-11-25

## 2020-11-25 RX ORDER — LIDOCAINE 40 MG/G
CREAM TOPICAL ONCE
Status: CANCELLED | OUTPATIENT
Start: 2020-11-25

## 2020-11-25 RX ORDER — LIDOCAINE 50 MG/G
OINTMENT TOPICAL ONCE
Status: CANCELLED | OUTPATIENT
Start: 2020-11-25

## 2020-11-25 RX ORDER — CLOBETASOL PROPIONATE 0.5 MG/G
OINTMENT TOPICAL ONCE
Status: CANCELLED | OUTPATIENT
Start: 2020-11-25

## 2020-11-25 RX ORDER — LIDOCAINE HYDROCHLORIDE 40 MG/ML
SOLUTION TOPICAL ONCE
Status: CANCELLED | OUTPATIENT
Start: 2020-11-25

## 2020-11-25 NOTE — PLAN OF CARE
Discharge instructions given. Patient verbalized understanding. Return to [address] [place] [address],[address] in 1 week.   Called/faxed orders to [facility] spoke with assistant Director of Nursing

## 2020-11-25 NOTE — PROGRESS NOTES
[address] Progress and Procedure Note    David Phillip  AGE: 69 y.o. GENDER: female    : 1950  TODAY'S DATE: 2020    Chief Complaint   Patient presents with    Wound Check     left leg        History of Present Illness     David Phillip is a 69 y.o. female who presents today for wound evaluation. History of Wound: infectious wound located on the left groin.    Wound Pain:  mild  Severity:  1 / 10   Wound Type:  infectious  Modifying Factors:  diabetes, poor glucose control, decreased mobility and obesity  Associated Signs/Symptoms:  none    Past Medical History:   Diagnosis Date    Diabetes mellitus ([facility] [address]      Past Surgical History:   Procedure Laterality Date    GROIN SURGERY Left 2020    INCISION AND DRAINAGE OF LEFT GROIN AND LEFT UPPER THIGH, DEBRIDEMENT OF LEFT GROIN AND LEFT UPPER THIGH performed by Too Robles MD at [address] [place] [address]     Current Outpatient Medications   Medication Sig Dispense Refill    aspirin 81 MG chewable tablet Take 81 mg by mouth daily      atorvastatin (LIPITOR) 40 MG tablet Take 40 mg by mouth daily      ticagrelor (BRILINTA) 90 MG TABS tablet Take 90 mg by mouth 2 times daily      fenofibrate (TRICOR) 145 MG tablet Take 145 mg by mouth daily      furosemide (LASIX) 40 MG tablet Take 40 mg by mouth daily      isosorbide mononitrate (IMDUR) 30 MG extended release tablet Take 30 mg by mouth daily      insulin detemir (LEVEMIR) 100 UNIT/ML injection vial Inject 30 Units into the skin nightly      losartan (COZAAR) 50 MG tablet Take 50 mg by mouth daily      melatonin 3 MG TABS tablet Take 3 mg by mouth daily      metoclopramide (REGLAN) 5 MG tablet Take 5 mg by mouth 4 times daily      metoprolol tartrate (LOPRESSOR) 50 MG tablet Take 50 mg by mouth 2 times daily      insulin regular (HUMULIN R;NOVOLIN R) 100 UNIT/ML injection Inject 10 Units into the skin 3 times daily      OXcarbazepine (TRILEPTAL) 600 MG tablet Take 600 mg by mouth 2 times daily      oxybutynin (DITROPAN) 5 MG tablet Take 5 mg by mouth 2 times daily      sertraline (ZOLOFT) 50 MG tablet Take 50 mg by mouth daily      Cholecalciferol (VITAMIN D3) 125 MCG (5000 UT) TABS Take by mouth       Current Facility-Administered Medications   Medication Dose Route Frequency Provider Last Rate Last Dose    lidocaine (LMX) 4 % cream   Topical Once Jean Pierre Cano MD          Social History:   Social History     Tobacco Use    Smoking status: Never Smoker    Smokeless tobacco: Never Used   Substance Use Topics    Alcohol use: Never     Frequency: Never    Drug use: Never     Allergies:  Chocolate and Seroquel [quetiapine]    Procedure: Indications:  Based on my examination of this patient's wound(s)/ulcer(s) today, debridement is required to promote healing and evaluate the wound base. Performed by: Julian Fagan MD    Consent obtained: Yes    Time out taken: Yes    Pain Control: Anesthetic  Anesthetic: 4% Lidocaine Cream     Debridement: Excisional Debridement    Using curette the wound was sharply debrided    down through and including the removal of epidermis, dermis and subcutaneous tissue.         Devitalized Tissue Debrided: fibrin, biofilm and slough    Pre Debridement Measurements:  Are located in the Wound Documentation Flow Sheet     Wound #: 1     Post  Debridement Measurements:  Wound 08/26/20 Groin Left #1 (Active)   Wound Image   10/28/20 1010   Wound Etiology Non-Healing Surgical 11/25/20 1109   Wound Cleansed Cleansed with saline 11/25/20 1128   Dressing/Treatment Moist to dry 11/11/20 1030   Wound Length (cm) 2.1 cm 11/25/20 1109   Wound Width (cm) 3.8 cm 11/25/20 1109   Wound Depth (cm) 0.1 cm 11/25/20 1109   Wound Surface Area (cm^2) 7.98 cm^2 11/25/20 1109   Change in Wound Size % (l*w) 90.61 11/25/20 1109   Wound Volume (cm^3) 0.8 cm^3 11/25/20 1109   Wound Healing % 100 11/25/20 1109   Post-Procedure Length (cm) 2.1 cm 11/25/20 1128 Post-Procedure Width (cm) 3.8 cm 11/25/20 1128   Post-Procedure Depth (cm) 0.1 cm 11/25/20 1128   Post-Procedure Surface Area (cm^2) 7.98 cm^2 11/25/20 1128   Post-Procedure Volume (cm^3) 0.8 cm^3 11/25/20 1128   Wound Assessment Bleeding 11/25/20 1128   Drainage Amount Moderate 11/25/20 1128   Drainage Description Serosanguinous 11/25/20 1109   Odor None 11/25/20 1109   Denise-wound Assessment Intact 11/25/20 1109   Margins Defined edges 11/25/20 1109   Wound Thickness Description not for Pressure Injury Full thickness 11/25/20 1109   Number of days: 91       Percent of Wound(s)/Ulcer(s) Debrided: 100%    Total Surface Area Debrided:  7.98 sq cm     Bleeding:  Minimal    Hemostasis Achieved:  by pressure    Procedural Pain:  1  / 10     Post Procedural Pain:  1 / 10     Response to treatment:  Well tolerated by patient. Assessment:     Wound looks improved. (improved, worse or stable)    Patient tolerated procedure well and was given proper instruction. The nature of the patient's condition was explained in depth. The patient was informed that their compliance to the treatment plan is paramount to successful healing and prevention of further ulceration and/or infection     Plan:     Treatment Plan:       Treatment Note please see attached Discharge Instructions    Written patient dismissal instructions given to patient and signed by patient or POA. Discharge [address]  Telephone: [phone]     FAX [phone]      Discharge Instructions:  Keep weight off wounds and reposition every 2 hours if applicable. Avoid standing for long periods of time.      If wound(s) is on your lower extremity, elevate legs to the level of the heart or above for 30 minutes 4-5 times a day and/or when sitting.      Do not get wounds wet in bath or shower unless otherwise instructed by your physician. If your wound is on you foot or leg, may purchase a cast bag. Please ask at the pharmacy.     When taking antibiotics take entire prescription as ordered by MD do not stop taking until medicine is all gone. Exercise as tolerated. No Smoking. Smoking prohibits wound healing.     If Vascular testing is ordered, please call [address] [place] [address] [phone]) to schedule.     Vascular tests ordered by Wound Care Physicians may take up to 2 hours to complete. Please keep that in mind when scheduling.      If Vascular testing is scheduled, please bring supplies to replace your dressing after testing is done. The vascular department does not stock supplies.      Wound: Left groin      With each dressing change, rinse wounds with 0.9% Saline. (May use wound wash or soft contact solution. Both can be purchased at a local drug store). If unable to obtain saline, may use a gentle soap and water.     Dressing care: Moist to dry, dry dressing- change daily. Must send aid or staff member who can sit with patient to wait for transportation- for patient safety.     Next Dressing change due ___as instructed____________     Important dietary reminders:  1. Increase Protein intake for optimal wound healing  2. No added salt  3.  If diabetic, good glucose control     Follow up with Dr Mikal Pope In 1 week in the wound care center.      Call [phone] for any questions or concerns.      Your  is [address] Agency/Supply Company: [employer]  [address] Information: Should you experience any significant changes in your wound(s) or have questions about your wound care, please contact the [facility] 30  [phone] Monday  - Thursday 8:00 am - 4:00 pm and Friday 8:00 am - 1:00pm. If you need help with your wound outside these hours and cannot wait until we are again available, contact your PCP or go to the hospital emergency room.      PLEASE NOTE: IF YOU ARE UNABLE TO OBTAIN WOUND SUPPLIES, CONTINUE TO USE THE [facility] [employer] AVAILABLE UNTIL YOU ARE ABLE TO REACH US. IT IS MOST IMPORTANT TO KEEP THE WOUND COVERED AT ALL TIMES. [address]  Sandi Florez MD, FACS  11/25/2020  12:15 PM

## 2020-12-09 ENCOUNTER — HOSPITAL ENCOUNTER (OUTPATIENT)
Dept: WOUND CARE | Age: 70
Discharge: HOME | End: 2020-12-09
Payer: COMMERCIAL

## 2020-12-09 VITALS — HEART RATE: 76 BPM | SYSTOLIC BLOOD PRESSURE: 159 MMHG | DIASTOLIC BLOOD PRESSURE: 87 MMHG | RESPIRATION RATE: 18 BRPM

## 2020-12-09 PROCEDURE — 11042 DBRDMT SUBQ TIS 1ST 20SQCM/<: CPT

## 2020-12-09 PROCEDURE — 11042 DBRDMT SUBQ TIS 1ST 20SQCM/<: CPT | Performed by: SURGERY

## 2020-12-09 RX ORDER — GINSENG 100 MG
CAPSULE ORAL ONCE
Status: CANCELLED | OUTPATIENT
Start: 2020-12-09

## 2020-12-09 RX ORDER — BACITRACIN, NEOMYCIN, POLYMYXIN B 400; 3.5; 5 [USP'U]/G; MG/G; [USP'U]/G
OINTMENT TOPICAL ONCE
Status: CANCELLED | OUTPATIENT
Start: 2020-12-09

## 2020-12-09 RX ORDER — BETAMETHASONE DIPROPIONATE 0.05 %
OINTMENT (GRAM) TOPICAL ONCE
Status: CANCELLED | OUTPATIENT
Start: 2020-12-09

## 2020-12-09 RX ORDER — GENTAMICIN SULFATE 1 MG/G
OINTMENT TOPICAL ONCE
Status: CANCELLED | OUTPATIENT
Start: 2020-12-09

## 2020-12-09 RX ORDER — LIDOCAINE HYDROCHLORIDE 40 MG/ML
SOLUTION TOPICAL ONCE
Status: CANCELLED | OUTPATIENT
Start: 2020-12-09

## 2020-12-09 RX ORDER — LIDOCAINE 40 MG/G
CREAM TOPICAL ONCE
Status: CANCELLED | OUTPATIENT
Start: 2020-12-09

## 2020-12-09 RX ORDER — LIDOCAINE HYDROCHLORIDE 20 MG/ML
JELLY TOPICAL ONCE
Status: CANCELLED | OUTPATIENT
Start: 2020-12-09

## 2020-12-09 RX ORDER — CLOBETASOL PROPIONATE 0.5 MG/G
OINTMENT TOPICAL ONCE
Status: CANCELLED | OUTPATIENT
Start: 2020-12-09

## 2020-12-09 RX ORDER — BACITRACIN ZINC AND POLYMYXIN B SULFATE 500; 1000 [USP'U]/G; [USP'U]/G
OINTMENT TOPICAL ONCE
Status: CANCELLED | OUTPATIENT
Start: 2020-12-09

## 2020-12-09 RX ORDER — LIDOCAINE 50 MG/G
OINTMENT TOPICAL ONCE
Status: CANCELLED | OUTPATIENT
Start: 2020-12-09

## 2020-12-09 RX ORDER — LIDOCAINE 40 MG/G
CREAM TOPICAL ONCE
Status: DISCONTINUED | OUTPATIENT
Start: 2020-12-09 | End: 2020-12-10 | Stop reason: HOSPADM

## 2020-12-09 NOTE — PROGRESS NOTES
(TRILEPTAL) 600 MG tablet Take 600 mg by mouth 2 times daily      oxybutynin (DITROPAN) 5 MG tablet Take 5 mg by mouth 2 times daily      sertraline (ZOLOFT) 50 MG tablet Take 50 mg by mouth daily      Cholecalciferol (VITAMIN D3) 125 MCG (5000 UT) TABS Take by mouth       Current Facility-Administered Medications   Medication Dose Route Frequency Provider Last Rate Last Dose    lidocaine (LMX) 4 % cream   Topical Once Nyla Lara MD          Social History:   Social History     Tobacco Use    Smoking status: Never Smoker    Smokeless tobacco: Never Used   Substance Use Topics    Alcohol use: Never     Frequency: Never    Drug use: Never     Allergies:  Chocolate and Seroquel [quetiapine]    Procedure: Indications:  Based on my examination of this patient's wound(s)/ulcer(s) today, debridement is required to promote healing and evaluate the wound base. Performed by: Shweta Flores MD    Consent obtained: Yes    Time out taken: Yes    Pain Control: Anesthetic  Anesthetic: 4% Lidocaine Cream     Debridement: Excisional Debridement    Using curette the wound was sharply debrided    down through and including the removal of epidermis, dermis and subcutaneous tissue.         Devitalized Tissue Debrided: fibrin, biofilm and slough    Pre Debridement Measurements:  Are located in the Wound Documentation Flow Sheet     Wound #: 1     Post  Debridement Measurements:  Wound 08/26/20 Groin Left #1 (Active)   Wound Image   10/28/20 1010   Wound Etiology Non-Healing Surgical 12/09/20 0914   Wound Cleansed Cleansed with saline 12/09/20 0928   Dressing/Treatment Moist to dry 11/11/20 1030   Wound Length (cm) 2 cm 12/09/20 0914   Wound Width (cm) 3.5 cm 12/09/20 0914   Wound Depth (cm) 0.1 cm 12/09/20 0914   Wound Surface Area (cm^2) 7 cm^2 12/09/20 0914   Change in Wound Size % (l*w) 91.76 12/09/20 0914   Wound Volume (cm^3) 0.7 cm^3 12/09/20 0914   Wound Healing % 100 12/09/20 0914   Post-Procedure Length (cm) 2 cm 12/09/20 0928   Post-Procedure Width (cm) 3.5 cm 12/09/20 0928   Post-Procedure Depth (cm) 0.1 cm 12/09/20 0928   Post-Procedure Surface Area (cm^2) 7 cm^2 12/09/20 0928   Post-Procedure Volume (cm^3) 0.7 cm^3 12/09/20 0928   Wound Assessment Bleeding 12/09/20 0928   Drainage Amount Moderate 12/09/20 0928   Drainage Description Serosanguinous 12/09/20 0914   Odor None 12/09/20 0914   Denise-wound Assessment Intact 12/09/20 0914   Margins Attached edges; Defined edges 12/09/20 0914   Wound Thickness Description not for Pressure Injury Partial thickness 12/09/20 0914   Number of days: 105       Percent of Wound(s)/Ulcer(s) Debrided: 100%    Total Surface Area Debrided:  7 sq cm     Bleeding:  Minimal    Hemostasis Achieved:  by pressure    Procedural Pain:  1  / 10     Post Procedural Pain:  2 / 10     Response to treatment:  Well tolerated by patient. Assessment:     Wound looks improved. (improved, worse or stable)    Patient tolerated procedure well and was given proper instruction. The nature of the patient's condition was explained in depth. The patient was informed that their compliance to the treatment plan is paramount to successful healing and prevention of further ulceration and/or infection     Plan:     Treatment Plan:       Treatment Note please see attached Discharge Instructions    Written patient dismissal instructions given to patient and signed by patient or POA. Discharge [address]  Telephone: [phone]     FAX [phone]      Discharge Instructions:  Keep weight off wounds and reposition every 2 hours if applicable. Avoid standing for long periods of time.      If wound(s) is on your lower extremity, elevate legs to the level of the heart or above for 30 minutes 4-5 times a day and/or when sitting.      Do not get wounds wet in bath or shower unless otherwise instructed by your physician. If your wound is on you foot or leg, may purchase a cast bag. Please ask at the pharmacy.     When taking antibiotics take entire prescription as ordered by MD do not stop taking until medicine is all gone. Exercise as tolerated. No Smoking. Smoking prohibits wound healing.     If Vascular testing is ordered, please call [address] [place] [address] [phone]) to schedule.     Vascular tests ordered by Wound Care Physicians may take up to 2 hours to complete. Please keep that in mind when scheduling.      If Vascular testing is scheduled, please bring supplies to replace your dressing after testing is done. The vascular department does not stock supplies.      Wound: Left groin      With each dressing change, rinse wounds with 0.9% Saline. (May use wound wash or soft contact solution. Both can be purchased at a local drug store). If unable to obtain saline, may use a gentle soap and water.     Dressing care: Moist to dry, dry dressing- change daily. Must send aid or staff member who can sit with patient to wait for transportation- for patient safety.     Next Dressing change due ___as instructed____________     Important dietary reminders:  1. Increase Protein intake for optimal wound healing  2. No added salt  3.  If diabetic, good glucose control     Follow up with Dr Roque Montana In 1 week in the wound care center.      Call [phone] for any questions or concerns.      Your  is [employer] Agency/Supply Company: [employer] [address] Information: Should you experience any significant changes in your wound(s) or have questions about your wound care, please contact the [facility] 30  [phone] Monday  - Thursday 8:00 am - 4:00 pm and Friday 8:00 am - 1:00pm. If you need help with your wound outside these hours and cannot wait until we are again available, contact your PCP or go to the hospital emergency room.      PLEASE NOTE: IF YOU ARE UNABLE TO [address] TO USE THE SUPPLIES YOU HAVE AVAILABLE UNTIL YOU ARE ABLE TO REACH US. IT IS MOST IMPORTANT TO KEEP THE WOUND COVERED AT ALL TIMES. I[address] 6  Jose Russell MD, FACS  12/9/2020  2:11 PM

## 2020-12-16 ENCOUNTER — HOSPITAL ENCOUNTER (OUTPATIENT)
Dept: WOUND CARE | Age: 70
Discharge: HOME | End: 2020-12-16
Payer: COMMERCIAL

## 2020-12-16 VITALS — SYSTOLIC BLOOD PRESSURE: 152 MMHG | HEART RATE: 80 BPM | DIASTOLIC BLOOD PRESSURE: 84 MMHG

## 2020-12-16 PROCEDURE — 11042 DBRDMT SUBQ TIS 1ST 20SQCM/<: CPT

## 2020-12-16 PROCEDURE — 11042 DBRDMT SUBQ TIS 1ST 20SQCM/<: CPT | Performed by: SURGERY

## 2020-12-16 RX ORDER — GINSENG 100 MG
CAPSULE ORAL ONCE
Status: CANCELLED | OUTPATIENT
Start: 2020-12-16 | End: 2020-12-16

## 2020-12-16 RX ORDER — BETAMETHASONE DIPROPIONATE 0.05 %
OINTMENT (GRAM) TOPICAL ONCE
Status: CANCELLED | OUTPATIENT
Start: 2020-12-16 | End: 2020-12-16

## 2020-12-16 RX ORDER — LIDOCAINE 50 MG/G
OINTMENT TOPICAL ONCE
Status: CANCELLED | OUTPATIENT
Start: 2020-12-16 | End: 2020-12-16

## 2020-12-16 RX ORDER — LIDOCAINE HYDROCHLORIDE 20 MG/ML
JELLY TOPICAL ONCE
Status: CANCELLED | OUTPATIENT
Start: 2020-12-16 | End: 2020-12-16

## 2020-12-16 RX ORDER — CLOBETASOL PROPIONATE 0.5 MG/G
OINTMENT TOPICAL ONCE
Status: CANCELLED | OUTPATIENT
Start: 2020-12-16 | End: 2020-12-16

## 2020-12-16 RX ORDER — LIDOCAINE 40 MG/G
CREAM TOPICAL ONCE
Status: DISCONTINUED | OUTPATIENT
Start: 2020-12-16 | End: 2020-12-17 | Stop reason: HOSPADM

## 2020-12-16 RX ORDER — BACITRACIN, NEOMYCIN, POLYMYXIN B 400; 3.5; 5 [USP'U]/G; MG/G; [USP'U]/G
OINTMENT TOPICAL ONCE
Status: CANCELLED | OUTPATIENT
Start: 2020-12-16 | End: 2020-12-16

## 2020-12-16 RX ORDER — BACITRACIN ZINC AND POLYMYXIN B SULFATE 500; 1000 [USP'U]/G; [USP'U]/G
OINTMENT TOPICAL ONCE
Status: CANCELLED | OUTPATIENT
Start: 2020-12-16 | End: 2020-12-16

## 2020-12-16 RX ORDER — GENTAMICIN SULFATE 1 MG/G
OINTMENT TOPICAL ONCE
Status: CANCELLED | OUTPATIENT
Start: 2020-12-16 | End: 2020-12-16

## 2020-12-16 RX ORDER — LIDOCAINE HYDROCHLORIDE 40 MG/ML
SOLUTION TOPICAL ONCE
Status: CANCELLED | OUTPATIENT
Start: 2020-12-16 | End: 2020-12-16

## 2020-12-16 RX ORDER — LIDOCAINE 40 MG/G
CREAM TOPICAL ONCE
Status: CANCELLED | OUTPATIENT
Start: 2020-12-16 | End: 2020-12-16

## 2020-12-16 NOTE — PROGRESS NOTES
[address] Progress and Procedure Note    Cassidy Ball  AGE: 69 y.o. GENDER: female    : 1950  TODAY'S DATE: 2020    Chief Complaint   Patient presents with    Wound Check     left groin  F/U        History of Present Illness     Cassidy Ball is a 69 y.o. female who presents today for wound evaluation. History of Wound: infectious wound located on the left groin.    Wound Pain:  mild  Severity:  1 / 10   Wound Type:  infectious  Modifying Factors:  diabetes, poor glucose control, decreased mobility and obesity  Associated Signs/Symptoms:  none    Past Medical History:   Diagnosis Date    Diabetes mellitus ([facility] [address]      Past Surgical History:   Procedure Laterality Date    GROIN SURGERY Left 2020    INCISION AND DRAINAGE OF LEFT GROIN AND LEFT UPPER THIGH, DEBRIDEMENT OF LEFT GROIN AND LEFT UPPER THIGH performed by Karen Oden MD at [address] [place] [address]     Current Outpatient Medications   Medication Sig Dispense Refill    aspirin 81 MG chewable tablet Take 81 mg by mouth daily      atorvastatin (LIPITOR) 40 MG tablet Take 40 mg by mouth daily      ticagrelor (BRILINTA) 90 MG TABS tablet Take 90 mg by mouth 2 times daily      fenofibrate (TRICOR) 145 MG tablet Take 145 mg by mouth daily      furosemide (LASIX) 40 MG tablet Take 40 mg by mouth daily      isosorbide mononitrate (IMDUR) 30 MG extended release tablet Take 30 mg by mouth daily      insulin detemir (LEVEMIR) 100 UNIT/ML injection vial Inject 30 Units into the skin nightly      losartan (COZAAR) 50 MG tablet Take 50 mg by mouth daily      melatonin 3 MG TABS tablet Take 3 mg by mouth daily      metoclopramide (REGLAN) 5 MG tablet Take 5 mg by mouth 4 times daily      metoprolol tartrate (LOPRESSOR) 50 MG tablet Take 50 mg by mouth 2 times daily      insulin regular (HUMULIN R;NOVOLIN R) 100 UNIT/ML injection Inject 10 Units into the skin 3 times daily      OXcarbazepine (TRILEPTAL) 600 MG 12/16/20 0937   Post-Procedure Width (cm) 2.8 cm 12/16/20 1520   Post-Procedure Depth (cm) 0.1 cm 12/16/20 0937   Post-Procedure Surface Area (cm^2) 6.72 cm^2 12/16/20 0937   Post-Procedure Volume (cm^3) 0.67 cm^3 12/16/20 0937   Wound Assessment Bleeding 12/16/20 0937   Drainage Amount Moderate 12/16/20 0937   Drainage Description Clear;Yellow 12/16/20 0926   Odor None 12/16/20 0926   Denise-wound Assessment Intact 12/16/20 0926   Margins Defined edges 12/16/20 0926   Wound Thickness Description not for Pressure Injury Partial thickness 12/09/20 0914   Number of days: 112       Percent of Wound(s)/Ulcer(s) Debrided: 100%    Total Surface Area Debrided:  6.72 sq cm     Bleeding:  Minimal    Hemostasis Achieved:  by pressure    Procedural Pain:  1  / 10     Post Procedural Pain:  1 / 10     Response to treatment:  Well tolerated by patient. Assessment:     Wound looks improved. (improved, worse or stable)    Patient tolerated procedure well and was given proper instruction. The nature of the patient's condition was explained in depth. The patient was informed that their compliance to the treatment plan is paramount to successful healing and prevention of further ulceration and/or infection     Plan:     Treatment Plan:       Treatment Note please see attached Discharge Instructions    Written patient dismissal instructions given to patient and signed by patient or POA. Discharge [address]  Telephone: [phone]     FAX [phone]      Discharge Instructions:  Keep weight off wounds and reposition every 2 hours if applicable. Avoid standing for long periods of time.      If wound(s) is on your lower extremity, elevate legs to the level of the heart or above for 30 minutes 4-5 times a day and/or when sitting.      Do not get wounds wet in bath or shower unless otherwise instructed by your physician. If your wound is on you foot or leg, may purchase a cast bag. Please ask at the pharmacy.     When taking antibiotics take entire prescription as ordered by MD do not stop taking until medicine is all gone. Exercise as tolerated. No Smoking. Smoking prohibits wound healing.     If Vascular testing is ordered, please call [address] [place] [address] [phone]) to schedule.     Vascular tests ordered by Wound Care Physicians may take up to 2 hours to complete. Please keep that in mind when scheduling.      If Vascular testing is scheduled, please bring supplies to replace your dressing after testing is done. The vascular department does not stock supplies.      Wound: Left groin      With each dressing change, rinse wounds with 0.9% Saline. (May use wound wash or soft contact solution. Both can be purchased at a local drug store). If unable to obtain saline, may use a gentle soap and water.     Dressing care: Moist to dry, dry dressing- change daily. Must send aid or staff member who can sit with patient to wait for transportation- for patient safety.     Next Dressing change due ___as instructed____________     Important dietary reminders:  1. Increase Protein intake for optimal wound healing  2. No added salt  3.  If diabetic, good glucose control     Follow up with Dr Diann Castillo In 1 week in the wound care center.      Call [phone] for any questions or concerns.      Your  is [facility] Agency/Supply Company: [employer] [address] [address] Information: Should you experience any significant changes in your wound(s) or have questions about your wound care, please contact the [facility] 30  [phone] Monday  - Thursday 8:00 am - 4:00 pm and Friday 8:00 am - 1:00pm. If you need help with your wound outside these hours and cannot wait until we are again available, contact your PCP or go to the hospital emergency room.      PLEASE NOTE: IF YOU ARE UNABLE TO OBTAIN WOUND SUPPLIES, CONTINUE TO USE THE SUPPLIES YOU HAVE AVAILABLE UNTIL YOU ARE ABLE TO REACH US. IT IS MOST IMPORTANT TO KEEP THE WOUND COVERED AT ALL TIMES. [address] 6  Carlos Manuel Smith MD, FACS  12/16/2020  10:46 AM

## 2020-12-16 NOTE — PLAN OF CARE
Discharge instructions given. Patient verbalized understanding. Return to [facility] in 1 week.

## 2020-12-23 ENCOUNTER — HOSPITAL ENCOUNTER (OUTPATIENT)
Dept: WOUND CARE | Age: 70
Discharge: HOME | End: 2020-12-23
Payer: COMMERCIAL

## 2020-12-30 ENCOUNTER — HOSPITAL ENCOUNTER (OUTPATIENT)
Dept: WOUND CARE | Age: 70
Discharge: HOME | End: 2020-12-30
Payer: COMMERCIAL

## 2021-01-06 ENCOUNTER — HOSPITAL ENCOUNTER (OUTPATIENT)
Dept: WOUND CARE | Age: 71
Discharge: HOME | End: 2021-01-06
Payer: COMMERCIAL

## 2021-01-06 VITALS
BODY MASS INDEX: 34.56 KG/M2 | HEART RATE: 80 BPM | SYSTOLIC BLOOD PRESSURE: 134 MMHG | DIASTOLIC BLOOD PRESSURE: 74 MMHG | WEIGHT: 234 LBS | TEMPERATURE: 97.1 F

## 2021-01-06 DIAGNOSIS — S81.802S OPEN WOUND OF LEFT LOWER LEG, SEQUELA: Primary | ICD-10-CM

## 2021-01-06 DIAGNOSIS — S81.802A OPEN WOUND OF LEFT LOWER LEG, INITIAL ENCOUNTER: ICD-10-CM

## 2021-01-06 PROCEDURE — 11042 DBRDMT SUBQ TIS 1ST 20SQCM/<: CPT

## 2021-01-06 PROCEDURE — 11042 DBRDMT SUBQ TIS 1ST 20SQCM/<: CPT | Performed by: SURGERY

## 2021-01-06 RX ORDER — LIDOCAINE 50 MG/G
OINTMENT TOPICAL ONCE
Status: CANCELLED | OUTPATIENT
Start: 2021-01-06 | End: 2021-01-06

## 2021-01-06 RX ORDER — LIDOCAINE HYDROCHLORIDE 40 MG/ML
SOLUTION TOPICAL ONCE
Status: CANCELLED | OUTPATIENT
Start: 2021-01-06 | End: 2021-01-06

## 2021-01-06 RX ORDER — LIDOCAINE 40 MG/G
CREAM TOPICAL ONCE
Status: DISCONTINUED | OUTPATIENT
Start: 2021-01-06 | End: 2021-01-07 | Stop reason: HOSPADM

## 2021-01-06 RX ORDER — CLOBETASOL PROPIONATE 0.5 MG/G
OINTMENT TOPICAL ONCE
Status: CANCELLED | OUTPATIENT
Start: 2021-01-06 | End: 2021-01-06

## 2021-01-06 RX ORDER — BACITRACIN ZINC AND POLYMYXIN B SULFATE 500; 1000 [USP'U]/G; [USP'U]/G
OINTMENT TOPICAL ONCE
Status: CANCELLED | OUTPATIENT
Start: 2021-01-06 | End: 2021-01-06

## 2021-01-06 RX ORDER — BACITRACIN, NEOMYCIN, POLYMYXIN B 400; 3.5; 5 [USP'U]/G; MG/G; [USP'U]/G
OINTMENT TOPICAL ONCE
Status: CANCELLED | OUTPATIENT
Start: 2021-01-06 | End: 2021-01-06

## 2021-01-06 RX ORDER — BETAMETHASONE DIPROPIONATE 0.05 %
OINTMENT (GRAM) TOPICAL ONCE
Status: CANCELLED | OUTPATIENT
Start: 2021-01-06 | End: 2021-01-06

## 2021-01-06 RX ORDER — GINSENG 100 MG
CAPSULE ORAL ONCE
Status: CANCELLED | OUTPATIENT
Start: 2021-01-06 | End: 2021-01-06

## 2021-01-06 RX ORDER — LIDOCAINE 40 MG/G
CREAM TOPICAL ONCE
Status: CANCELLED | OUTPATIENT
Start: 2021-01-06 | End: 2021-01-06

## 2021-01-06 RX ORDER — LIDOCAINE HYDROCHLORIDE 20 MG/ML
JELLY TOPICAL ONCE
Status: CANCELLED | OUTPATIENT
Start: 2021-01-06 | End: 2021-01-06

## 2021-01-06 RX ORDER — GENTAMICIN SULFATE 1 MG/G
OINTMENT TOPICAL ONCE
Status: CANCELLED | OUTPATIENT
Start: 2021-01-06 | End: 2021-01-06

## 2021-01-06 NOTE — PROGRESS NOTES
[address] Progress and Procedure Note    Alber Hercules  AGE: 69 y.o. GENDER: female    : 1950  TODAY'S DATE: 2021    Chief Complaint   Patient presents with    Wound Check     left groin  F/U        History of Present Illness     Alber Hercules is a 69 y.o. female who presents today for wound evaluation. History of Wound: infectious wound located on the left groin.    Wound Pain:  mild  Severity:  1 / 10   Wound Type:  infectious  Modifying Factors:  diabetes, poor glucose control, decreased mobility and obesity  Associated Signs/Symptoms:  none    Past Medical History:   Diagnosis Date    Diabetes mellitus ([facility] [address]      Past Surgical History:   Procedure Laterality Date    GROIN SURGERY Left 2020    INCISION AND DRAINAGE OF LEFT GROIN AND LEFT UPPER THIGH, DEBRIDEMENT OF LEFT GROIN AND LEFT UPPER THIGH performed by Elva Holley MD at [address] [place] [address]     Current Outpatient Medications   Medication Sig Dispense Refill    cyanocobalamin 500 MCG tablet Take 500 mcg by mouth daily      fenofibrate (TRICOR) 54 MG tablet       ferrous sulfate (FE TABS 325) 325 (65 Fe) MG EC tablet Take 325 mg by mouth      OZEMPIC, 1 MG/DOSE, 2 MG/1.5ML SOPN       rivaroxaban (XARELTO) 20 MG TABS tablet Take 20 mg by mouth      Sennosides 8.6 MG CAPS Take 2 tablets by mouth as needed      aspirin 81 MG chewable tablet Take 81 mg by mouth daily      atorvastatin (LIPITOR) 40 MG tablet Take 40 mg by mouth daily      ticagrelor (BRILINTA) 90 MG TABS tablet Take 90 mg by mouth 2 times daily      fenofibrate (TRICOR) 145 MG tablet Take 145 mg by mouth daily      furosemide (LASIX) 40 MG tablet Take 40 mg by mouth daily      isosorbide mononitrate (IMDUR) 30 MG extended release tablet Take 30 mg by mouth daily      insulin detemir (LEVEMIR) 100 UNIT/ML injection vial Inject 30 Units into the skin nightly      losartan (COZAAR) 50 MG tablet Take 100 mg by mouth daily       off wounds and reposition every 2 hours if applicable. Avoid standing for long periods of time.      If wound(s) is on your lower extremity, elevate legs to the level of the heart or above for 30 minutes 4-5 times a day and/or when sitting.      Do not get wounds wet in bath or shower unless otherwise instructed by your physician. If your wound is on you foot or leg, may purchase a cast bag. Please ask at the pharmacy.     When taking antibiotics take entire prescription as ordered by MD do not stop taking until medicine is all gone. Exercise as tolerated. No Smoking. Smoking prohibits wound healing.     If Vascular testing is ordered, please call [address] [place] [address] [phone]) to schedule.     Vascular tests ordered by Wound Care Physicians may take up to 2 hours to complete. Please keep that in mind when scheduling.      If Vascular testing is scheduled, please bring supplies to replace your dressing after testing is done. The vascular department does not stock supplies.      Wound: Left groin      With each dressing change, rinse wounds with 0.9% Saline. (May use wound wash or soft contact solution. Both can be purchased at a local drug store). If unable to obtain saline, may use a gentle soap and water.     Dressing care: Moist to dry, dry dressing- change daily. Must send aid or staff member who can sit with patient to wait for transportation- for patient safety.     Next Dressing change due ___as instructed____________     Important dietary reminders:  1. Increase Protein intake for optimal wound healing  2. No added salt  3.  If diabetic, good glucose control     Follow up with Dr Reena Bhagat In 1 week in the wound care center.      Call [phone] for any questions or concerns.      Your  is [employer]: [employer] [address] Information: Should you experience any significant changes in your wound(s) or have questions about your wound care, please contact the [address]  [phone] Monday  - Thursday 8:00 am - 4:00 pm and Friday 8:00 am - 1:00pm. If you need help with your wound outside these hours and cannot wait until we are again available, contact your PCP or go to the hospital emergency room.      PLEASE NOTE: IF YOU ARE UNABLE TO [address], CONTINUE TO USE THE SUPPLIES YOU HAVE AVAILABLE UNTIL YOU ARE ABLE TO [facility]. IT IS MOST IMPORTANT TO KEEP THE WOUND COVERED AT ALL TIMES. [address]  Sandy Hancock MD, FACS  1/6/2021  12:09 PM

## 2021-01-06 NOTE — PLAN OF CARE
Discharge instructions given. Patient verbalized understanding. Return to [facility] in 1 week.

## 2021-01-13 ENCOUNTER — HOSPITAL ENCOUNTER (OUTPATIENT)
Dept: WOUND CARE | Age: 71
Discharge: HOME | End: 2021-01-13
Payer: COMMERCIAL

## 2021-01-13 VITALS — RESPIRATION RATE: 16 BRPM | DIASTOLIC BLOOD PRESSURE: 78 MMHG | HEART RATE: 74 BPM | SYSTOLIC BLOOD PRESSURE: 161 MMHG

## 2021-01-13 DIAGNOSIS — S81.802A OPEN WOUND OF LEFT LOWER LEG, INITIAL ENCOUNTER: Primary | ICD-10-CM

## 2021-01-13 PROCEDURE — 11042 DBRDMT SUBQ TIS 1ST 20SQCM/<: CPT | Performed by: SURGERY

## 2021-01-13 PROCEDURE — 11042 DBRDMT SUBQ TIS 1ST 20SQCM/<: CPT

## 2021-01-13 RX ORDER — LIDOCAINE 40 MG/G
CREAM TOPICAL ONCE
Status: DISCONTINUED | OUTPATIENT
Start: 2021-01-13 | End: 2021-01-14 | Stop reason: HOSPADM

## 2021-01-13 RX ORDER — LIDOCAINE 50 MG/G
OINTMENT TOPICAL ONCE
Status: CANCELLED | OUTPATIENT
Start: 2021-01-13 | End: 2021-01-13

## 2021-01-13 RX ORDER — BACITRACIN ZINC AND POLYMYXIN B SULFATE 500; 1000 [USP'U]/G; [USP'U]/G
OINTMENT TOPICAL ONCE
Status: DISCONTINUED | OUTPATIENT
Start: 2021-01-13 | End: 2021-01-14 | Stop reason: HOSPADM

## 2021-01-13 RX ORDER — LIDOCAINE HYDROCHLORIDE 20 MG/ML
JELLY TOPICAL ONCE
Status: CANCELLED | OUTPATIENT
Start: 2021-01-13 | End: 2021-01-13

## 2021-01-13 RX ORDER — LIDOCAINE HYDROCHLORIDE 40 MG/ML
SOLUTION TOPICAL ONCE
Status: CANCELLED | OUTPATIENT
Start: 2021-01-13 | End: 2021-01-13

## 2021-01-13 RX ORDER — BACITRACIN, NEOMYCIN, POLYMYXIN B 400; 3.5; 5 [USP'U]/G; MG/G; [USP'U]/G
OINTMENT TOPICAL ONCE
Status: CANCELLED | OUTPATIENT
Start: 2021-01-13 | End: 2021-01-13

## 2021-01-13 RX ORDER — GENTAMICIN SULFATE 1 MG/G
OINTMENT TOPICAL ONCE
Status: CANCELLED | OUTPATIENT
Start: 2021-01-13 | End: 2021-01-13

## 2021-01-13 RX ORDER — GINSENG 100 MG
CAPSULE ORAL ONCE
Status: CANCELLED | OUTPATIENT
Start: 2021-01-13 | End: 2021-01-13

## 2021-01-13 RX ORDER — BETAMETHASONE DIPROPIONATE 0.05 %
OINTMENT (GRAM) TOPICAL ONCE
Status: CANCELLED | OUTPATIENT
Start: 2021-01-13 | End: 2021-01-13

## 2021-01-13 RX ORDER — BACITRACIN ZINC AND POLYMYXIN B SULFATE 500; 1000 [USP'U]/G; [USP'U]/G
OINTMENT TOPICAL ONCE
Status: CANCELLED | OUTPATIENT
Start: 2021-01-13 | End: 2021-01-13

## 2021-01-13 RX ORDER — LIDOCAINE 40 MG/G
CREAM TOPICAL ONCE
Status: CANCELLED | OUTPATIENT
Start: 2021-01-13 | End: 2021-01-13

## 2021-01-13 RX ORDER — CLOBETASOL PROPIONATE 0.5 MG/G
OINTMENT TOPICAL ONCE
Status: CANCELLED | OUTPATIENT
Start: 2021-01-13 | End: 2021-01-13

## 2021-01-13 NOTE — PROGRESS NOTES
[address] Progress and Procedure Note    Ashley Meckel  AGE: 69 y.o. GENDER: female    : 1950  TODAY'S DATE: 2021    Chief Complaint   Patient presents with    Wound Check     left groin        History of Present Illness     Ashley Meckel is a 69 y.o. female who presents today for wound evaluation. History of Wound: infectious wound located on the left groin.    Wound Pain:  mild  Severity:  1 / 10   Wound Type:  infectious  Modifying Factors:  diabetes, poor glucose control, decreased mobility and obesity  Associated Signs/Symptoms:  none    Past Medical History:   Diagnosis Date    Diabetes mellitus ([facility] [address]      Past Surgical History:   Procedure Laterality Date    GROIN SURGERY Left 2020    INCISION AND DRAINAGE OF LEFT GROIN AND LEFT UPPER THIGH, DEBRIDEMENT OF LEFT GROIN AND LEFT UPPER THIGH performed by Sarika Bridges MD at [address] [place] [address]     Current Outpatient Medications   Medication Sig Dispense Refill    cyanocobalamin 500 MCG tablet Take 500 mcg by mouth daily      fenofibrate (TRICOR) 54 MG tablet       ferrous sulfate (FE TABS 325) 325 (65 Fe) MG EC tablet Take 325 mg by mouth      OZEMPIC, 1 MG/DOSE, 2 MG/1.5ML SOPN       rivaroxaban (XARELTO) 20 MG TABS tablet Take 20 mg by mouth      Sennosides 8.6 MG CAPS Take 2 tablets by mouth as needed      aspirin 81 MG chewable tablet Take 81 mg by mouth daily      atorvastatin (LIPITOR) 40 MG tablet Take 40 mg by mouth daily      ticagrelor (BRILINTA) 90 MG TABS tablet Take 90 mg by mouth 2 times daily      fenofibrate (TRICOR) 145 MG tablet Take 145 mg by mouth daily      furosemide (LASIX) 40 MG tablet Take 40 mg by mouth daily      isosorbide mononitrate (IMDUR) 30 MG extended release tablet Take 30 mg by mouth daily      insulin detemir (LEVEMIR) 100 UNIT/ML injection vial Inject 30 Units into the skin nightly      losartan (COZAAR) 50 MG tablet Take 100 mg by mouth daily       melatonin 3 MG TABS tablet Take 3 mg by mouth daily      metoclopramide (REGLAN) 5 MG tablet Take 5 mg by mouth 4 times daily      metoprolol tartrate (LOPRESSOR) 50 MG tablet Take 50 mg by mouth 2 times daily      insulin regular (HUMULIN R;NOVOLIN R) 100 UNIT/ML injection Inject 10 Units into the skin 3 times daily      OXcarbazepine (TRILEPTAL) 600 MG tablet Take 600 mg by mouth 2 times daily      oxybutynin (DITROPAN) 5 MG tablet Take 5 mg by mouth 2 times daily      sertraline (ZOLOFT) 50 MG tablet Take 100 mg by mouth daily       Cholecalciferol (VITAMIN D3) 125 MCG (5000 UT) TABS Take by mouth       Current Facility-Administered Medications   Medication Dose Route Frequency Provider Last Rate Last Admin    lidocaine (LMX) 4 % cream   Topical Once Joane Fothergill, MD          Social History:   Social History     Tobacco Use    Smoking status: Never Smoker    Smokeless tobacco: Never Used   Substance Use Topics    Alcohol use: Never     Frequency: Never    Drug use: Never     Allergies:  Chocolate and Seroquel [quetiapine]    Procedure: Indications:  Based on my examination of this patient's wound(s)/ulcer(s) today, debridement is required to promote healing and evaluate the wound base. Performed by: Ahmet Henderson MD    Consent obtained: Yes    Time out taken: Yes    Pain Control: Anesthetic  Anesthetic: 4% Lidocaine Cream     Debridement: Excisional Debridement    Using curette the wound was sharply debrided    down through and including the removal of epidermis, dermis and subcutaneous tissue.         Devitalized Tissue Debrided: fibrin, biofilm and slough    Pre Debridement Measurements:  Are located in the Wound Documentation Flow Sheet     Wound #: 1     Post  Debridement Measurements:  Wound 08/26/20 Groin Left #1 (Active)   Wound Image   01/13/21 0985   Wound Etiology Non-Healing Surgical 01/13/21 0918   Wound Cleansed Cleansed with saline 01/13/21 0927   Dressing/Treatment Moist to dry 11/11/20 1030   Wound Length (cm) 0.6 cm 01/13/21 0918   Wound Width (cm) 0.4 cm 01/13/21 0918   Wound Depth (cm) 0.1 cm 01/13/21 0918   Wound Surface Area (cm^2) 0.24 cm^2 01/13/21 0918   Change in Wound Size % (l*w) 99.72 01/13/21 0918   Wound Volume (cm^3) 0.02 cm^3 01/13/21 0918   Wound Healing % 100 01/13/21 0918   Post-Procedure Length (cm) 0.6 cm 01/13/21 0927   Post-Procedure Width (cm) 0.4 cm 01/13/21 0927   Post-Procedure Depth (cm) 0.1 cm 01/13/21 0927   Post-Procedure Surface Area (cm^2) 0.24 cm^2 01/13/21 0927   Post-Procedure Volume (cm^3) 0.02 cm^3 01/13/21 0927   Wound Assessment Bleeding 01/13/21 0927   Drainage Amount Moderate 01/13/21 0927   Drainage Description Serosanguinous 01/13/21 0918   Odor None 01/13/21 0918   Denise-wound Assessment Intact 01/13/21 0918   Margins Defined edges 01/13/21 0918   Wound Thickness Description not for Pressure Injury Partial thickness 01/13/21 0918   Number of days: 140       Percent of Wound(s)/Ulcer(s) Debrided: 100%    Total Surface Area Debrided:  0.24 sq cm     Bleeding:  Minimal    Hemostasis Achieved:  by pressure    Procedural Pain:  1  / 10     Post Procedural Pain:  1 / 10     Response to treatment:  Well tolerated by patient. Assessment:     Wound looks improved. (improved, worse or stable)    Patient tolerated procedure well and was given proper instruction. The nature of the patient's condition was explained in depth. The patient was informed that their compliance to the treatment plan is paramount to successful healing and prevention of further ulceration and/or infection     Plan:     Treatment Plan:       Treatment Note please see attached Discharge Instructions    Written patient dismissal instructions given to patient and signed by patient or POA.          Discharge [address]  Telephone: [phone]     FAX [phone]      Discharge Instructions:  Keep weight off wounds and reposition every 2 hours if applicable. Avoid standing for long periods of time.      If wound(s) is on your lower extremity, elevate legs to the level of the heart or above for 30 minutes 4-5 times a day and/or when sitting.      Do not get wounds wet in bath or shower unless otherwise instructed by your physician. If your wound is on you foot or leg, may purchase a cast bag. Please ask at the pharmacy.     When taking antibiotics take entire prescription as ordered by MD do not stop taking until medicine is all gone. Exercise as tolerated. No Smoking. Smoking prohibits wound healing.     If Vascular testing is ordered, please call [address] [place] [address] [phone]) to schedule.     Vascular tests ordered by Wound Care Physicians may take up to 2 hours to complete. Please keep that in mind when scheduling.      If Vascular testing is scheduled, please bring supplies to replace your dressing after testing is done. The vascular department does not stock supplies.      Wound: Left groin      With each dressing change, rinse wounds with 0.9% Saline. (May use wound wash or soft contact solution. Both can be purchased at a local drug store). If unable to obtain saline, may use a gentle soap and water.     Dressing care: Antibiotic ointment dry dressing- change daily. Must send aid or staff member who can sit with patient to wait for transportation- for patient safety.     Next Dressing change due ___as instructed____________     Important dietary reminders:  1. Increase Protein intake for optimal wound healing  2. No added salt  3.  If diabetic, good glucose control     Follow up with Dr Emily Valerio In 1 week in the wound care center.      Call [phone] for any questions or concerns.      Your  is [employer]: [employer] [address] Information: Should you experience any significant changes in your wound(s) or have questions about your wound care, please contact the [address]  [phone] Monday  - Thursday 8:00 am - 4:00 pm and Friday 8:00 am - 1:00pm. If you need help with your wound outside these hours and cannot wait until we are again available, contact your PCP or go to the hospital emergency room.      PLEASE NOTE: IF YOU ARE UNABLE TO [address], CONTINUE TO USE THE SUPPLIES YOU HAVE AVAILABLE UNTIL YOU ARE ABLE TO [facility]. IT IS MOST IMPORTANT TO KEEP THE WOUND COVERED AT ALL TIMES. [address]  Kiara Woods MD, FACS  1/13/2021  10:06 AM

## 2021-01-20 ENCOUNTER — HOSPITAL ENCOUNTER (OUTPATIENT)
Dept: WOUND CARE | Age: 71
Discharge: HOME | End: 2021-01-20
Payer: COMMERCIAL

## 2021-01-20 VITALS
TEMPERATURE: 97.1 F | BODY MASS INDEX: 34.56 KG/M2 | WEIGHT: 234 LBS | HEART RATE: 82 BPM | DIASTOLIC BLOOD PRESSURE: 40 MMHG | SYSTOLIC BLOOD PRESSURE: 85 MMHG

## 2021-01-20 DIAGNOSIS — S81.802S OPEN WOUND OF LEFT LOWER LEG, SEQUELA: Primary | ICD-10-CM

## 2021-01-20 DIAGNOSIS — S81.802A OPEN WOUND OF LEFT LOWER LEG, INITIAL ENCOUNTER: ICD-10-CM

## 2021-01-20 PROCEDURE — 99212 OFFICE O/P EST SF 10 MIN: CPT | Performed by: SURGERY

## 2021-01-20 PROCEDURE — 99212 OFFICE O/P EST SF 10 MIN: CPT

## 2021-01-20 RX ORDER — LIDOCAINE HYDROCHLORIDE 40 MG/ML
SOLUTION TOPICAL ONCE
Status: CANCELLED | OUTPATIENT
Start: 2021-01-20 | End: 2021-01-20

## 2021-01-20 RX ORDER — CLOBETASOL PROPIONATE 0.5 MG/G
OINTMENT TOPICAL ONCE
Status: CANCELLED | OUTPATIENT
Start: 2021-01-20 | End: 2021-01-20

## 2021-01-20 RX ORDER — LIDOCAINE HYDROCHLORIDE 20 MG/ML
JELLY TOPICAL ONCE
Status: CANCELLED | OUTPATIENT
Start: 2021-01-20 | End: 2021-01-20

## 2021-01-20 RX ORDER — GINSENG 100 MG
CAPSULE ORAL ONCE
Status: CANCELLED | OUTPATIENT
Start: 2021-01-20 | End: 2021-01-20

## 2021-01-20 RX ORDER — BACITRACIN, NEOMYCIN, POLYMYXIN B 400; 3.5; 5 [USP'U]/G; MG/G; [USP'U]/G
OINTMENT TOPICAL ONCE
Status: CANCELLED | OUTPATIENT
Start: 2021-01-20 | End: 2021-01-20

## 2021-01-20 RX ORDER — LIDOCAINE 50 MG/G
OINTMENT TOPICAL ONCE
Status: CANCELLED | OUTPATIENT
Start: 2021-01-20 | End: 2021-01-20

## 2021-01-20 RX ORDER — GENTAMICIN SULFATE 1 MG/G
OINTMENT TOPICAL ONCE
Status: CANCELLED | OUTPATIENT
Start: 2021-01-20 | End: 2021-01-20

## 2021-01-20 RX ORDER — BACITRACIN ZINC AND POLYMYXIN B SULFATE 500; 1000 [USP'U]/G; [USP'U]/G
OINTMENT TOPICAL ONCE
Status: CANCELLED | OUTPATIENT
Start: 2021-01-20 | End: 2021-01-20

## 2021-01-20 RX ORDER — LIDOCAINE 40 MG/G
CREAM TOPICAL ONCE
Status: DISCONTINUED | OUTPATIENT
Start: 2021-01-20 | End: 2021-01-21 | Stop reason: HOSPADM

## 2021-01-20 RX ORDER — BETAMETHASONE DIPROPIONATE 0.05 %
OINTMENT (GRAM) TOPICAL ONCE
Status: CANCELLED | OUTPATIENT
Start: 2021-01-20 | End: 2021-01-20

## 2021-01-20 RX ORDER — LIDOCAINE 40 MG/G
CREAM TOPICAL ONCE
Status: CANCELLED | OUTPATIENT
Start: 2021-01-20 | End: 2021-01-20

## 2021-01-20 NOTE — PLAN OF CARE
Discharge instructions given. Patient verbalized understanding.   Return to [facility] as needed   Wound healed

## 2021-01-20 NOTE — PROGRESS NOTES
[address] Progress Note    Agueda Leal  AGE: 69 y.o. GENDER: female    : 1950  TODAY'S DATE: 2021    Subjective:     Chief Complaint   Patient presents with    Wound Check     left groin F/U         History of Present Illness     Agueda Leal presents today for wound evaluation. History of Wound: infectious wound on left groin, healed    Wound Type:  infectious  Wound Location: left side  Groin  Wound Pain:  mild  Severity:  1 / 10   Timing: resolved  Modifying Factors:  diabetes, poor glucose control, decreased mobility and obesity  Associated Signs/Symptoms:  none  Other significant symptoms or pertinent wound history: as above.      Past Medical History:   Diagnosis Date    Diabetes mellitus ([facility] [address]        Past Surgical History:   Procedure Laterality Date    GROIN SURGERY Left 2020    INCISION AND DRAINAGE OF LEFT GROIN AND LEFT UPPER THIGH, DEBRIDEMENT OF LEFT GROIN AND LEFT UPPER THIGH performed by Tavon Sommer MD at [address] [place] [address]       Current Outpatient Medications   Medication Sig Dispense Refill    cyanocobalamin 500 MCG tablet Take 500 mcg by mouth daily      fenofibrate (TRICOR) 54 MG tablet       ferrous sulfate (FE TABS 325) 325 (65 Fe) MG EC tablet Take 325 mg by mouth      OZEMPIC, 1 MG/DOSE, 2 MG/1.5ML SOPN       rivaroxaban (XARELTO) 20 MG TABS tablet Take 20 mg by mouth      Sennosides 8.6 MG CAPS Take 2 tablets by mouth as needed      aspirin 81 MG chewable tablet Take 81 mg by mouth daily      atorvastatin (LIPITOR) 40 MG tablet Take 40 mg by mouth daily      ticagrelor (BRILINTA) 90 MG TABS tablet Take 90 mg by mouth 2 times daily      fenofibrate (TRICOR) 145 MG tablet Take 145 mg by mouth daily      furosemide (LASIX) 40 MG tablet Take 40 mg by mouth daily      isosorbide mononitrate (IMDUR) 30 MG extended release tablet Take 30 mg by mouth daily      insulin detemir (LEVEMIR) 100 UNIT/ML injection vial Inject 30 Units into the skin nightly      losartan (COZAAR) 50 MG tablet Take 100 mg by mouth daily       melatonin 3 MG TABS tablet Take 3 mg by mouth daily      metoclopramide (REGLAN) 5 MG tablet Take 5 mg by mouth 4 times daily      metoprolol tartrate (LOPRESSOR) 50 MG tablet Take 50 mg by mouth 2 times daily      insulin regular (HUMULIN R;NOVOLIN R) 100 UNIT/ML injection Inject 10 Units into the skin 3 times daily      OXcarbazepine (TRILEPTAL) 600 MG tablet Take 600 mg by mouth 2 times daily      oxybutynin (DITROPAN) 5 MG tablet Take 5 mg by mouth 2 times daily      sertraline (ZOLOFT) 50 MG tablet Take 100 mg by mouth daily       Cholecalciferol (VITAMIN D3) 125 MCG (5000 UT) TABS Take by mouth       Current Facility-Administered Medications   Medication Dose Route Frequency Provider Last Rate Last Admin    lidocaine (LMX) 4 % cream   Topical Once French Wiley MD            Allergies   Allergen Reactions    Chocolate Hives     Breakout in hives on face    Seroquel [Quetiapine] Anxiety             REVIEW OF SYSTEMS    Pertinent items from the review of systems are discussed in the HPI; the remainder of the ROS was reviewed and is negative.     Objective:      BP (!) 85/40   Pulse 82   Temp 97.1 °F (36.2 °C) (Tympanic)   Wt 234 lb (106.1 kg)   BMI 34.56 kg/m²     PHYSICAL EXAM    General Appearance: alert and oriented to person, place and time, well developed and well- nourished, in no acute distress  Skin: warm and dry, no rash or erythema  Head: normocephalic and atraumatic  Left groin wound has healed    Assessment:     Patient Active Problem List   Diagnosis    DKA, type 2, not at goal [facility])    Diabetic ketoacidosis without coma associated with type 2 diabetes mellitus ([facility] [address]    Severe sepsis ([facility] [address]    Lactic acidosis    Atelectasis    Necrotizing soft tissue infection    Cellulitis of left groin    Open wound of left lower leg              Plan:     The nature of the patient's condition was explained in depth. The patient was informed that their compliance to the treatment plan is paramount to successful healing and prevention of further ulceration and/or infection     Treatment Plan:       Treatment Note please see attached Discharge Instructions    Written patient dismissal instructions given to patient and signed by patient or POA. Discharge Instructions       Congratulations! You have completed your treatment program. We have outlined a list of reminders to assist you in maintaining your continues health. 1. Return to your primary care physician for all your health issues. 2. Resume your ordinary activities as prescribed. 3. Take your medications as prescribed by your doctor. 4. Check your skin daily for cracks, bruises, sores, or dryness. 5. Clean and dry your skin thoroughly. 6. Avoid alcohol. Quit smoking if applicable. 7. Maintain a nutritious diet; take a multivitamin daily. 8. Avoid pressure on the wound site. Keep your legs elevated above the level of your heart whenever possible. 9. Continue to use wraps/stockings/compresion if prescribed/  10. Replace compression hose/stockings every 6 months to insure proper fit. 11. Wear well fitting shoes. Call the [address] [place] [address] [employer] if your wound reopens or you develop new wounds or if you have any other questions about follow up care [phone]        Sudhakar Rico MD, FACS  1/20/2021  10:40 AM

## (undated) DEVICE — 3M™ STERI-STRIP™ REINFORCED ADHESIVE SKIN CLOSURES, R1547, 1/2 IN X 4 IN (12 MM X 100 MM), 6 STRIPS/ENVELOPE: Brand: 3M™ STERI-STRIP™

## (undated) DEVICE — INTENDED FOR TISSUE SEPARATION, AND OTHER PROCEDURES THAT REQUIRE A SHARP SURGICAL BLADE TO PUNCTURE OR CUT.: Brand: BARD-PARKER ® STAINLESS STEEL BLADES

## (undated) DEVICE — PAD ABSRB W8XL10IN ABD HYDROPHOBIC NONWOVEN THCK LAYR CELOS

## (undated) DEVICE — SHEET,DRAPE,53X77,STERILE: Brand: MEDLINE

## (undated) DEVICE — ELECTRODE PT RET AD L9FT HI MOIST COND ADH HYDRGEL CORDED

## (undated) DEVICE — PENCIL ES ULT VAC W TELSCP NOSE EZ CLN BLDE 10FT

## (undated) DEVICE — DRAPE ADOLESCENT  LAPAROTOMY

## (undated) DEVICE — TRAY PREP DRY W/ PREM GLV 2 APPL 6 SPNG 2 UNDPD 1 OVERWRAP

## (undated) DEVICE — GAUZE,SPONGE,4"X4",8PLY,STRL,LF,10/TRAY: Brand: MEDLINE

## (undated) DEVICE — BANDAGE,GAUZE,BULKEE II,4.5"X4.1YD,STRL: Brand: MEDLINE

## (undated) DEVICE — SOLUTION IV IRRIG POUR BRL 0.9% SODIUM CHL 2F7124

## (undated) DEVICE — MERCY FAIRFIELD TURNOVER KIT: Brand: MEDLINE INDUSTRIES, INC.

## (undated) DEVICE — MASTISOL ADHESIVE LIQ 2/3ML

## (undated) DEVICE — BLADE ES ELASTOMERIC COAT INSUL DURABLE BEND UPTO 90DEG

## (undated) DEVICE — GOWN SIRUS NONREIN XL W/TWL: Brand: MEDLINE INDUSTRIES, INC.

## (undated) DEVICE — SUTURE VCRL SZ 3-0 L18IN ABSRB UD L26MM SH 1/2 CIR J864D

## (undated) DEVICE — SPONGE LAP W18XL18IN WHT COT 4 PLY FLD STRUNG RADPQ DISP ST

## (undated) DEVICE — STERILE POLYISOPRENE POWDER-FREE SURGICAL GLOVES: Brand: PROTEXIS